# Patient Record
Sex: FEMALE | Race: WHITE | NOT HISPANIC OR LATINO | Employment: OTHER | URBAN - METROPOLITAN AREA
[De-identification: names, ages, dates, MRNs, and addresses within clinical notes are randomized per-mention and may not be internally consistent; named-entity substitution may affect disease eponyms.]

---

## 2017-01-08 ENCOUNTER — OFFICE VISIT (OUTPATIENT)
Dept: URGENT CARE | Facility: RETAIL CLINIC | Age: 47
End: 2017-01-08

## 2017-01-08 VITALS — TEMPERATURE: 98.5 F | DIASTOLIC BLOOD PRESSURE: 68 MMHG | SYSTOLIC BLOOD PRESSURE: 114 MMHG | HEART RATE: 67 BPM

## 2017-01-08 DIAGNOSIS — H60.503 ACUTE OTITIS EXTERNA OF BOTH EARS, UNSPECIFIED TYPE: Primary | ICD-10-CM

## 2017-01-08 PROCEDURE — 99213 OFFICE O/P EST LOW 20 MIN: CPT | Performed by: PHYSICIAN ASSISTANT

## 2017-01-08 RX ORDER — OFLOXACIN 3 MG/ML
SOLUTION/ DROPS OPHTHALMIC
Qty: 1 BOTTLE | Refills: 0 | Status: SHIPPED | OUTPATIENT
Start: 2017-01-08 | End: 2017-01-15

## 2017-01-08 NOTE — PATIENT INSTRUCTIONS
Use antibiotic drops in your ears 2x a day for 7 days  Symptomatic measures reviewed including over the counter pain reliever such as  ibuprofen as needed.   Keep water out of ear until the infection is cleared.   During long showers this week, use cotton ball in ear to keep water out of ear.  May swim with wax ear plug in affected ear this week if not painful.   No Qtips.   Warm compress next to ear   Please follow up with primary care provider if not improving, worsening or new symptoms or for any adverse reactions to medications.     Consider putting a few drops of vinegar in your ears few times a week during a shower to help rebalance (acidic) balance of ear

## 2017-01-08 NOTE — MR AVS SNAPSHOT
After Visit Summary   1/8/2017    Earlene Mark    MRN: [id number]           Patient Information     Date Of Birth          1970        Visit Information        Provider Department      1/8/2017 1:50 PM Gricelda Esteban PA-C [facility]        Today's Diagnoses     Acute otitis externa of both ears, unspecified type    -  1       Care Instructions    Use antibiotic drops in your ears 2x a day for 7 days  Symptomatic measures reviewed including over the counter pain reliever such as  ibuprofen as needed.   Keep water out of ear until the infection is cleared.   During long showers this week, use cotton ball in ear to keep water out of ear.  May swim with wax ear plug in affected ear this week if not painful.   No Qtips.   Warm compress next to ear   Please follow up with primary care provider if not improving, worsening or new symptoms or for any adverse reactions to medications.     Consider putting a few drops of vinegar in your ears few times a week during a shower to help rebalance (acidic) balance of ear        Follow-ups after your visit        Who to contact     You can reach your care team any time of the day by calling [phone].  Notification of test results:  If you have an abnormal lab result, we will notify you by phone as soon as possible.         Additional Information About Your Visit        MyChart Information     [employer] gives you secure access to your electronic health record. If you see a primary care provider, you can also send messages to your care team and make appointments. If you have questions, please call your primary care clinic.  If you do not have a primary care provider, please call [phone] and they will assist you.        Care EveryWhere ID     This is your Care EveryWhere ID. This could be used by other organizations to access your [place] medical records  [id number]        Your Vitals Were     Pulse Temperature Last Period              67 98.5  F (36.9  C) (Oral) 02/01/2010          Blood Pressure from Last 3 Encounters:   01/08/17 114/68   08/26/16 101/58   02/13/16 100/60    Weight from Last 3 Encounters:   02/13/16 130 lb (58.968 kg)   10/23/15 130 lb (58.968 kg)   02/19/14 129 lb 1.9 oz (58.568 kg)              Today, you had the following     No orders found for display         Today's Medication Changes          These changes are accurate as of: 1/8/17  2:05 PM.  If you have any questions, ask your nurse or doctor.               Start taking these medicines.        Dose/Directions    ofloxacin 0.3 % ophthalmic solution   Commonly known as:  OCUFLOX   Used for:  Acute otitis externa of both ears, unspecified type        Place 5 drops in both ears twice a day for 7 days   Quantity:  1 Bottle   Refills:  0         These medicines have changed or have updated prescriptions.        Dose/Directions    traZODone 50 MG tablet   Commonly known as:  DESYREL   This may have changed:  how much to take   Used for:  Other insomnia        Dose:  75 mg   Take 1.5 tablets (75 mg) by mouth At Bedtime   Quantity:  135 tablet   Refills:  3            Where to get your medicines      These medications were sent to [id number] - [place], [place] - [address] [address]  [address] [address]     Phone:  [phone]    - ofloxacin 0.3 % ophthalmic solution             Primary Care Provider Office Phone # Fax #    Kushal Linda [phone]       [facility] [address] [place] [address]        Thank you!     Thank you for choosing [facility] [place]  for your care. Our goal is always to provide you with excellent care. Hearing back from our patients is one way we can continue to improve our services. Please take a few minutes to complete the written survey that you may receive in the mail after your visit with us. Thank you!             Your Updated Medication List - Protect  others around you: Learn how to safely use, store and throw away your medicines at www.disposemymeds.org.          This list is accurate as of: 1/8/17  2:05 PM.  Always use your most recent med list.                   Brand Name Dispense Instructions for use    fluticasone 50 MCG/ACT spray    FLONASE    16 g    Spray 1-2 sprays into both nostrils daily       IBUPROFEN PO      Take 600 mg by mouth       Multiple vitamin Tabs      1 TABLET ORALLY DAILY       ofloxacin 0.3 % ophthalmic solution    OCUFLOX    1 Bottle    Place 5 drops in both ears twice a day for 7 days       rizatriptan 10 MG tablet    MAXALT    54 tablet    TAKE 1/2 TO 1 TABLET BY MOUTH AT ONSET OF MIGRAINE HEADACHE, MAY REPEAT IN 2 HOURS (DO NOT EXCEED 3 TABLETS (30MG) IN 24 HOURS)       traZODone 50 MG tablet    DESYREL    135 tablet    Take 1.5 tablets (75 mg) by mouth At Bedtime

## 2017-01-08 NOTE — PROGRESS NOTES
Chief Complaint   Patient presents with     Otalgia     began in Right, now both; 3 days; using old Rx drops - ofloxacin      SUBJECTIVE:  Earlene Mark is a 46 year old female who presents with Right ear pain that started 3 days ago, now also having symptoms in left ear today  Severity: moderate   Timing:gradual onset and still present  Additional symptoms include ear sxs, headache  No congestion  History of recurrent otitis: yes  Using old prescription ear drops 3 doses in right ear- ofloxacin opthalmic drops from 04/2016 - Rx states 5 drops BID x 7 days  No drainage    Past Medical History   Diagnosis Date     Headache(784.0) 10/21/92     Abnormal Papanicolaou smear of cervix and cervical HPV 1990     none since LEEP     Allergic rhinitis, cause unspecified      ? 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 12/18/2009     Menorrhagia 12/18/2009     MENSTRUAL MIGRAINES 5/14/2007     ANXIETY STATE NOS 6/6/2003     DEPRESSIVE DISORDER NEC 6/6/2003     s/p Hysterectomy, cervical pathology benign, no need for further pap smears 1/3/2013     Current Outpatient Prescriptions   Medication Sig Dispense Refill     ofloxacin (OCUFLOX) 0.3 % ophthalmic solution Place 5 drops in both ears twice a day for 7 days 1 Bottle 0     rizatriptan (MAXALT) 10 MG tablet TAKE 1/2 TO 1 TABLET BY MOUTH AT ONSET OF MIGRAINE HEADACHE, MAY REPEAT IN 2 HOURS (DO NOT EXCEED 3 TABLETS (30MG) IN 24 HOURS) 54 tablet 5     traZODone (DESYREL) 50 MG tablet Take 1.5 tablets (75 mg) by mouth At Bedtime (Patient taking differently: Take 25 mg by mouth At Bedtime ) 135 tablet 3     fluticasone (FLONASE) 50 MCG/ACT nasal spray Spray 1-2 sprays into both nostrils daily 16 g 1     IBUPROFEN PO Take 600 mg by mouth       MULTIPLE VITAMIN OR TABS 1 TABLET ORALLY DAILY  0        Allergies   Allergen Reactions     Morphine      Prochlorperazine      compazine -dystonic reaction     Seasonal Allergies         History   Smoking  status     Never Smoker    Smokeless tobacco     Never Used       ROS:   Review of systems negative except as stated above.    OBJECTIVE:  /68 mmHg  Pulse 67  Temp(Src) 98.5  F (36.9  C) (Oral)  LMP 02/01/2010  The right TM is normal: no effusions, no erythema, and normal landmarks     The right auditory canal is erythematous, swollen and tender  The left TM is mildly erythematous, swollen  The left auditory canal is normal and without drainage, edema or erythema  Oropharynx exam is normal: no lesions, erythema, adenopathy or exudate.  GENERAL: no acute distress  NECK: supple, non-tender to palpation, no adenopathy noted  SKIN: no suspicious lesions or rashes     ASSESSMENT:  Acute otitis externa of both ears, unspecified type [H60.503]    PLAN:  OFLOXACIN 0.3 % OP SOLN  Use antibiotic drops in your ears 2x a day for 7 days  Symptomatic measures reviewed including over the counter pain reliever such as  ibuprofen as needed.   Keep water out of ear until the infection is cleared.   During long showers this week, use cotton ball in ear to keep water out of ear.  May swim with wax ear plug in affected ear this week if not painful.   No Qtips.   Warm compress next to ear   Please follow up with primary care provider if not improving, worsening or new symptoms or for any adverse reactions to medications.   Consider putting a few drops of vinegar in your ears few times a week during a shower to help rebalance (acidic) balance of ear    Gricelda Esteban PA-C  [facility] [place]

## 2017-05-08 DIAGNOSIS — G47.09 OTHER INSOMNIA: ICD-10-CM

## 2017-05-08 NOTE — TELEPHONE ENCOUNTER
traZODone (DESYREL) 50 MG tablet  Last Written Prescription Date:  1/28/16  Last Fill Quantity: 135,   # refills: 3  Last Office Visit with [facility], [facility] or [facility] prescribing provider: 2/19/14  Future Office visit:       Routing refill request to provider for review/approval because:  Drug not on the [facility], [facility] or [facility] refill protocol or controlled substance

## 2017-05-09 RX ORDER — TRAZODONE HYDROCHLORIDE 50 MG/1
TABLET, FILM COATED ORAL
Qty: 135 TABLET | Refills: 3 | Status: SHIPPED | OUTPATIENT
Start: 2017-05-09 | End: 2018-01-31

## 2017-06-14 DIAGNOSIS — G43.009 MIGRAINE WITHOUT AURA AND WITHOUT STATUS MIGRAINOSUS, NOT INTRACTABLE: Primary | ICD-10-CM

## 2017-06-15 PROBLEM — G43.009 MIGRAINE WITHOUT AURA AND WITHOUT STATUS MIGRAINOSUS, NOT INTRACTABLE: Status: ACTIVE | Noted: 2017-06-15

## 2017-06-15 RX ORDER — RIZATRIPTAN BENZOATE 10 MG/1
TABLET ORAL
Qty: 54 TABLET | Refills: 4 | Status: SHIPPED | OUTPATIENT
Start: 2017-06-15 | End: 2018-06-05

## 2017-06-15 NOTE — TELEPHONE ENCOUNTER
Routing refill request to provider for review/approval because:  Patient needs to be seen because it has been more than 1 year since last office visit.    Radha Toney RN

## 2017-06-15 NOTE — TELEPHONE ENCOUNTER
rizatriptan (MAXALT) 10 MG tablet      Last Written Prescription Date: 4/6/16  Last Fill Quantity: 54, # refills: 5  Last Office Visit with [facility], [facility] or [facility] prescribing provider: 10/23/15       BP Readings from Last 3 Encounters:   01/08/17 114/68   08/26/16 101/58   02/13/16 100/60

## 2017-08-01 ENCOUNTER — OFFICE VISIT (OUTPATIENT)
Dept: URGENT CARE | Facility: RETAIL CLINIC | Age: 47
End: 2017-08-01

## 2017-08-01 VITALS
OXYGEN SATURATION: 99 % | HEART RATE: 65 BPM | SYSTOLIC BLOOD PRESSURE: 123 MMHG | TEMPERATURE: 97.8 F | DIASTOLIC BLOOD PRESSURE: 73 MMHG

## 2017-08-01 DIAGNOSIS — J01.90 ACUTE SINUSITIS WITH SYMPTOMS > 10 DAYS: Primary | ICD-10-CM

## 2017-08-01 DIAGNOSIS — R05.9 COUGH: ICD-10-CM

## 2017-08-01 DIAGNOSIS — J02.9 ACUTE PHARYNGITIS, UNSPECIFIED ETIOLOGY: ICD-10-CM

## 2017-08-01 LAB — S PYO AG THROAT QL IA.RAPID: NORMAL

## 2017-08-01 PROCEDURE — 87880 STREP A ASSAY W/OPTIC: CPT | Mod: QW | Performed by: PHYSICIAN ASSISTANT

## 2017-08-01 PROCEDURE — 99214 OFFICE O/P EST MOD 30 MIN: CPT | Performed by: PHYSICIAN ASSISTANT

## 2017-08-01 PROCEDURE — 87081 CULTURE SCREEN ONLY: CPT | Performed by: PHYSICIAN ASSISTANT

## 2017-08-01 RX ORDER — DOXYCYCLINE HYCLATE 100 MG
100 TABLET ORAL 2 TIMES DAILY
Qty: 14 TABLET | Refills: 0 | Status: SHIPPED | OUTPATIENT
Start: 2017-08-01 | End: 2017-08-08

## 2017-08-01 RX ORDER — BENZONATATE 100 MG/1
CAPSULE ORAL
Qty: 40 CAPSULE | Refills: 0 | Status: SHIPPED | OUTPATIENT
Start: 2017-08-01 | End: 2018-04-20

## 2017-08-01 RX ORDER — CODEINE PHOSPHATE AND GUAIFENESIN 10; 100 MG/5ML; MG/5ML
1-2 SOLUTION ORAL EVERY 4 HOURS PRN
Qty: 120 ML | Refills: 0 | Status: SHIPPED | OUTPATIENT
Start: 2017-08-01 | End: 2018-04-20

## 2017-08-01 NOTE — PROGRESS NOTES
Chief Complaint   Patient presents with     Pharyngitis     x 13 days, no fevers     Sinus Problem     x 13 days, snus congestion, coughing x 3 days     Ear Problem     bilateral ear pain x 3 days     SUBJECTIVE:  Earlene Mark is a 47 year old female presenting with a chief complaint of sinus pain and pressure and a sore throat.  Onset of symptoms was 13 days ago.  Course of illness: gradual onset.  Severity: moderate  Current and Associated symptoms: nasal congestion, cough, ear pain bilaterally, sore throat, facial pain/pressure and headache. Facial pressure worse when bending forward. Cough has been for the last 3 days and is worse at night.  Treatment measures tried include: OTC meds- mucinex, netti pot, salt water rinses and apple cider vinegar.   Predisposing factors include: None.    Past Medical History:   Diagnosis Date     Abnormal Papanicolaou smear of cervix and cervical HPV 1990    none since LEEP     Allergic rhinitis, cause unspecified     ? 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 12/18/2009     DEPRESSIVE DISORDER NEC 6/6/2003     Headache(784.0) 10/21/92     Menorrhagia 12/18/2009     MENSTRUAL MIGRAINES 5/14/2007     s/p Hysterectomy, cervical pathology benign, no need for further pap smears 1/3/2013     Current Outpatient Prescriptions   Medication Sig Dispense Refill     GuaiFENesin (MUCINEX PO)        Sodium Chloride-Sodium Bicarb (AYR SALINE NASAL RINSE NA)        Pseudoephedrine-Ibuprofen (ADVIL COLD/SINUS PO)        doxycycline (VIBRA-TABS) 100 MG tablet Take 1 tablet (100 mg) by mouth 2 times daily for 7 days 14 tablet 0     guaiFENesin-codeine (ROBITUSSIN AC) 100-10 MG/5ML SOLN solution Take 5-10 mLs by mouth every 4 hours as needed for cough 120 mL 0     benzonatate (TESSALON) 100 MG capsule Take 1-2 capsules by mouth 3 times daily as needed for cough. 40 capsule 0     rizatriptan (MAXALT) 10 MG tablet TAKE 1/2 TO 1 TABLET BY  MOUTH AT ONSET OF MIGRAINE HEADACHE, MAY REPEAT IN 2 HOURS (DO NOT EXCEED 3 TABLETS (30MG) IN 24 HOURS) 54 tablet 4     traZODone (DESYREL) 50 MG tablet TAKE ONE AND ONE-HALF TABLET BY MOUTH AT BEDTIME 135 tablet 3     IBUPROFEN PO Take 600 mg by mouth       MULTIPLE VITAMIN OR TABS 1 TABLET ORALLY DAILY  0     fluticasone (FLONASE) 50 MCG/ACT nasal spray Spray 1-2 sprays into both nostrils daily (Patient not taking: Reported on 8/1/2017) 16 g 1     Social History   Substance Use Topics     Smoking status: Never Smoker     Smokeless tobacco: Never Used     Alcohol use No     Allergies   Allergen Reactions     Morphine      Prochlorperazine      compazine -dystonic reaction     Seasonal Allergies      ROS:  Review of systems negative except as stated above.    OBJECTIVE:   /73 (BP Location: Left arm)  Pulse 65  Temp 97.8  F (36.6  C) (Temporal)  LMP 02/01/2010  SpO2 99%  GENERAL APPEARANCE: healthy, alert and in no distress  HEENT: Pain with palpation over frontal sinuses bilaterally. Eyes PEERL, conjunctiva clear. Bilateral ear canals and TMs normal. Nasal turbinates edematous. Pharynx erythematous without tonsillar hypertrophy or exudate noted.  NECK: supple, non-tender to palpation, no adenopathy noted  RESP: lungs clear to auscultation - no rales, rhonchi or wheezes  CV: regular rates and rhythm, normal S1 S2, no murmur noted  SKIN: no suspicious lesions or rashes    Rapid Strep test is negative; await throat culture results.    ASSESSMENT:    ICD-10-CM    1. Acute sinusitis with symptoms > 10 days J01.90 doxycycline (VIBRA-TABS) 100 MG tablet   2. Acute pharyngitis, unspecified etiology J02.9 RAPID STREP SCREEN     BETA STREP GROUP A R/O CULTURE   3. Cough R05 guaiFENesin-codeine (ROBITUSSIN AC) 100-10 MG/5ML SOLN solution     benzonatate (TESSALON) 100 MG capsule     PLAN:   Discussed antibiotic options. Augmentin has not worked well in the past and she'd prefer an alternative if possible.  Patient  Instructions   Doxycycline twice daily for 7 days.  Tessalon Perles- Take 1-2 capsules by mouth 3 times daily as needed for cough.  Robitussin with codeine as needed for cough before sleep, up to every 4-6 hours. DO NOT take before driving a vehicle.  Sudafed behind the pharmacist counter for 3-5 days helps relieve congestion  Flonase 2 sprays in each nostril daily until symptoms resolve, then continue 1 spray in each nostril for at least 5 more days.  Take Tylenol or an NSAID such as ibuprofen or naproxen as needed for pain.  May use netti pot with bottled or distilled water and saline packets to flush sinuses.  Afrin (oxymetazoline) nasal spray twice daily for 3 days. Stop after 3 days.  Mucinex (guiafenesin) thins mucus and may help it to loosen more quickly  Saline drops or nasal sprays may loosen mucus.  Sit in the bathroom with the door closed and hot shower running to loosen mucus.    Contact primary care clinic if you do not have any relief from your symptoms after 10 days.  Present to emergency room for significantly increasing pain, persistent high fever >102F, swelling/redness around your eyes, changes in your vision or ability to move your eyes, altered mental status or a severe headache.    Follow up with primary care provider with any problems, questions or concerns or if symptoms worsen or fail to improve. Patient agreed to plan and verbalized understanding.    Brianna Archuleta PA-C  [facility] [place]

## 2017-08-01 NOTE — PATIENT INSTRUCTIONS
Doxycycline twice daily for 7 days.  Teservnion Perles- Take 1-2 capsules by mouth 3 times daily as needed for cough.  Robitussin with codeine as needed for cough before sleep, up to every 4-6 hours. DO NOT take before driving a vehicle.  Sudafed behind the pharmacist counter for 3-5 days helps relieve congestion  Flonase 2 sprays in each nostril daily until symptoms resolve, then continue 1 spray in each nostril for at least 5 more days.  Take Tylenol or an NSAID such as ibuprofen or naproxen as needed for pain.  May use netti pot with bottled or distilled water and saline packets to flush sinuses.  Afrin (oxymetazoline) nasal spray twice daily for 3 days. Stop after 3 days.  Mucinex (guiafenesin) thins mucus and may help it to loosen more quickly  Saline drops or nasal sprays may loosen mucus.  Sit in the bathroom with the door closed and hot shower running to loosen mucus.    Contact primary care clinic if you do not have any relief from your symptoms after 10 days.  Present to emergency room for significantly increasing pain, persistent high fever >102F, swelling/redness around your eyes, changes in your vision or ability to move your eyes, altered mental status or a severe headache.

## 2017-08-01 NOTE — MR AVS SNAPSHOT
After Visit Summary   8/1/2017    Earlene Mark    MRN: [id number]           Patient Information     Date Of Birth          1970        Visit Information        Provider Department      8/1/2017 9:20 AM Medina Archuleta PA-C [facility]        Today's Diagnoses     Acute sinusitis with symptoms > 10 days    -  1    Acute pharyngitis, unspecified etiology        Cough          Care Instructions    Doxycycline twice daily for 7 days.  Tessalon Perles- Take 1-2 capsules by mouth 3 times daily as needed for cough.  Robitussin with codeine as needed for cough before sleep, up to every 4-6 hours. DO NOT take before driving a vehicle.  Sudafed behind the pharmacist counter for 3-5 days helps relieve congestion  Flonase 2 sprays in each nostril daily until symptoms resolve, then continue 1 spray in each nostril for at least 5 more days.  Take Tylenol or an NSAID such as ibuprofen or naproxen as needed for pain.  May use netti pot with bottled or distilled water and saline packets to flush sinuses.  Afrin (oxymetazoline) nasal spray twice daily for 3 days. Stop after 3 days.  Mucinex (guiafenesin) thins mucus and may help it to loosen more quickly  Saline drops or nasal sprays may loosen mucus.  Sit in the bathroom with the door closed and hot shower running to loosen mucus.    Contact primary care clinic if you do not have any relief from your symptoms after 10 days.  Present to emergency room for significantly increasing pain, persistent high fever >102F, swelling/redness around your eyes, changes in your vision or ability to move your eyes, altered mental status or a severe headache.          Follow-ups after your visit        Who to contact     You can reach your care team any time of the day by calling [phone].  Notification of test results:  If you have an abnormal lab result, we will notify you by phone as soon as possible.         Additional Information About Your  Visit        [employer] Information     [employer] gives you secure access to your electronic health record. If you see a primary care provider, you can also send messages to your care team and make appointments. If you have questions, please call your primary care clinic.  If you do not have a primary care provider, please call [phone] and they will assist you.        Care EveryWhere ID     This is your Care EveryWhere ID. This could be used by other organizations to access your [place] medical records  [id number]        Your Vitals Were     Pulse Temperature Last Period Pulse Oximetry          65 97.8  F (36.6  C) (Temporal) 02/01/2010 99%         Blood Pressure from Last 3 Encounters:   08/01/17 123/73   01/08/17 114/68   08/26/16 101/58    Weight from Last 3 Encounters:   02/13/16 130 lb (59 kg)   10/23/15 130 lb (59 kg)   02/19/14 129 lb 1.9 oz (58.6 kg)              We Performed the Following     BETA STREP GROUP A R/O CULTURE     RAPID STREP SCREEN          Today's Medication Changes          These changes are accurate as of: 8/1/17  9:35 AM.  If you have any questions, ask your nurse or doctor.               Start taking these medicines.        Dose/Directions    benzonatate 100 MG capsule   Commonly known as:  TESSALON   Used for:  Cough        Take 1-2 capsules by mouth 3 times daily as needed for cough.   Quantity:  40 capsule   Refills:  0       doxycycline 100 MG tablet   Commonly known as:  VIBRA-TABS   Used for:  Acute sinusitis with symptoms > 10 days        Dose:  100 mg   Take 1 tablet (100 mg) by mouth 2 times daily for 7 days   Quantity:  14 tablet   Refills:  0       guaiFENesin-codeine 100-10 MG/5ML Soln solution   Commonly known as:  ROBITUSSIN AC   Used for:  Cough        Dose:  1-2 tsp.   Take 5-10 mLs by mouth every 4 hours as needed for cough   Quantity:  120 mL   Refills:  0            Where to get your medicines      These medications were sent to [id number] - [place] - [address]  [facility]  [address] [address] [address]     Phone:  [phone]     benzonatate 100 MG capsule    doxycycline 100 MG tablet         Some of these will need a paper prescription and others can be bought over the counter.  Ask your nurse if you have questions.     Bring a paper prescription for each of these medications     guaiFENesin-codeine 100-10 MG/5ML Soln solution                Primary Care Provider Office Phone # Fax #    Kushal Linda [phone]       [facility] [address] [place] [address]        Equal Access to Services     [facility]: Hadii aad ku hadasho Soomaali, waaxda luqadaha, qaybta kaalmada adeegyada, alyssa holden . So [facility] [phone].    ATENCIÓN: Si habla español, tiene a almeida disposición servicios gratuitos de asistencia lingüística. [facility] [phone].    We comply with applicable federal civil rights laws and [place] laws. We do not discriminate on the basis of race, color, national origin, age, disability sex, sexual orientation or gender identity.            Thank you!     Thank you for choosing [facility]  for your care. Our goal is always to provide you with excellent care. Hearing back from our patients is one way we can continue to improve our services. Please take a few minutes to complete the written survey that you may receive in the mail after your visit with us. Thank you!             Your Updated Medication List - Protect others around you: Learn how to safely use, store and throw away your medicines at www.disposemymeds.org.          This list is accurate as of: 8/1/17  9:35 AM.  Always use your most recent med list.                   Brand Name Dispense Instructions for use Diagnosis    ADVIL COLD/SINUS PO           AYR SALINE NASAL RINSE NA           benzonatate 100 MG capsule    TESSALON    40 capsule    Take 1-2 capsules by mouth 3 times daily as  needed for cough.    Cough       doxycycline 100 MG tablet    VIBRA-TABS    14 tablet    Take 1 tablet (100 mg) by mouth 2 times daily for 7 days    Acute sinusitis with symptoms > 10 days       fluticasone 50 MCG/ACT spray    FLONASE    16 g    Spray 1-2 sprays into both nostrils daily    Sinus congestion       guaiFENesin-codeine 100-10 MG/5ML Soln solution    ROBITUSSIN AC    120 mL    Take 5-10 mLs by mouth every 4 hours as needed for cough    Cough       IBUPROFEN PO      Take 600 mg by mouth        MUCINEX PO           Multiple vitamin Tabs      1 TABLET ORALLY DAILY        rizatriptan 10 MG tablet    MAXALT    54 tablet    TAKE 1/2 TO 1 TABLET BY MOUTH AT ONSET OF MIGRAINE HEADACHE, MAY REPEAT IN 2 HOURS (DO NOT EXCEED 3 TABLETS (30MG) IN 24 HOURS)    Migraine without aura and without status migrainosus, not intractable       traZODone 50 MG tablet    DESYREL    135 tablet    TAKE ONE AND ONE-HALF TABLET BY MOUTH AT BEDTIME    Other insomnia

## 2017-08-01 NOTE — NURSING NOTE
"Chief Complaint   Patient presents with     Pharyngitis     x 13 days, no fevers     Sinus Problem     x 13 days, snus congestion, coughing x 3 days     Ear Problem     bilateral ear pain x 3 days       Initial /73 (BP Location: Left arm)  Pulse 65  Temp 97.8  F (36.6  C) (Temporal)  LMP 02/01/2010  SpO2 99% Estimated body mass index is 21.47 kg/(m^2) as calculated from the following:    Height as of 10/23/15: 5' 5.25\" (1.657 m).    Weight as of 2/13/16: 130 lb (59 kg).  Medication Reconciliation: complete    "

## 2017-08-03 LAB — BETA STREP CONFIRM: NORMAL

## 2018-01-31 ENCOUNTER — TELEPHONE (OUTPATIENT)
Dept: FAMILY MEDICINE | Facility: CLINIC | Age: 48
End: 2018-01-31

## 2018-01-31 DIAGNOSIS — G47.09 OTHER INSOMNIA: ICD-10-CM

## 2018-01-31 RX ORDER — TRAZODONE HYDROCHLORIDE 50 MG/1
TABLET, FILM COATED ORAL
Qty: 135 TABLET | Refills: 3 | Status: SHIPPED | OUTPATIENT
Start: 2018-01-31 | End: 2018-09-23

## 2018-01-31 NOTE — TELEPHONE ENCOUNTER
Rx was sent to that pharmacy for her.     Electronically signed by:  Kushal Linda M.D.  1/31/2018

## 2018-01-31 NOTE — TELEPHONE ENCOUNTER
Reason for Call:  Other prescription- traZODone (DESYREL) 50 MG tablet    Detailed comments: Patient called and states that she usually requests this medication through the [facility] but it is more expensive to fill this prescription there so she is wondering if Dr. Linda would be able to fax the script to [facility]. She said she has used this pharmacy before. Their fax number is [phone] and their phone number is [phone]. Please advise.     Phone Number Patient can be reached at: Home number on file [phone] (home)    Best Time: any     Can we leave a detailed message on this number? YES    Call taken on 1/31/2018 at 2:27 PM by Immanuel Cannon

## 2018-04-20 ENCOUNTER — OFFICE VISIT (OUTPATIENT)
Dept: FAMILY MEDICINE | Facility: CLINIC | Age: 48
End: 2018-04-20

## 2018-04-20 VITALS
DIASTOLIC BLOOD PRESSURE: 54 MMHG | HEIGHT: 65 IN | RESPIRATION RATE: 16 BRPM | TEMPERATURE: 97.8 F | SYSTOLIC BLOOD PRESSURE: 102 MMHG | HEART RATE: 80 BPM | OXYGEN SATURATION: 100 % | WEIGHT: 139 LBS | BODY MASS INDEX: 23.16 KG/M2

## 2018-04-20 DIAGNOSIS — Z23 ENCOUNTER FOR IMMUNIZATION: ICD-10-CM

## 2018-04-20 DIAGNOSIS — Z00.00 ENCOUNTER FOR ROUTINE ADULT HEALTH EXAMINATION WITHOUT ABNORMAL FINDINGS: Primary | ICD-10-CM

## 2018-04-20 PROCEDURE — 99396 PREV VISIT EST AGE 40-64: CPT | Performed by: FAMILY MEDICINE

## 2018-04-20 PROCEDURE — 90715 TDAP VACCINE 7 YRS/> IM: CPT | Performed by: FAMILY MEDICINE

## 2018-04-20 ASSESSMENT — PAIN SCALES - GENERAL: PAINLEVEL: NO PAIN (0)

## 2018-04-20 NOTE — MR AVS SNAPSHOT
After Visit Summary   4/20/2018    Earlene Mark    MRN: [id number]           Patient Information     Date Of Birth          1970        Visit Information        Provider Department      4/20/2018 11:50 AM Kushal Linda MD [facility] Instructions      Preventive Health Recommendations  Female Ages 40 to 49    Yearly exam:     See your health care provider every year in order to  1. Review health changes.   2. Discuss preventive care.    3. Review your medicines if your doctor prescribed any.      Get a Pap test every three years (unless you have an abnormal result and your provider advises testing more often).      If you get Pap tests with HPV test, you only need to test every 5 years, unless you have an abnormal result. You do not need a Pap test if your uterus was removed (hysterectomy) and you have not had cancer.      You should be tested each year for STDs (sexually transmitted diseases), if you're at risk.       Ask your doctor if you should have a mammogram.      Have a colonoscopy (test for colon cancer) if someone in your family has had colon cancer or polyps before age 50.       Have a cholesterol test every 5 years.       Have a diabetes test (fasting glucose) after age 45. If you are at risk for diabetes, you should have this test every 3 years.    Shots: Get a flu shot each year. Get a tetanus shot every 10 years.     Nutrition:     Eat at least 5 servings of fruits and vegetables each day.    Eat whole-grain bread, whole-wheat pasta and brown rice instead of white grains and rice.    Talk to your provider about Calcium and Vitamin D.     Lifestyle    Exercise at least 150 minutes a week (an average of 30 minutes a day, 5 days a week). This will help you control your weight and prevent disease.    Limit alcohol to one drink per day.    No smoking.     Wear sunscreen to prevent skin cancer.    See your dentist every six months for an exam and  "cleaning.          Follow-ups after your visit        Who to contact     If you have questions or need follow up information about today's clinic visit or your schedule please contact [facility] directly at [phone].  Normal or non-critical lab and imaging results will be communicated to you by MyChart, letter or phone within 4 business days after the clinic has received the results. If you do not hear from us within 7 days, please contact the clinic through MyChart or phone. If you have a critical or abnormal lab result, we will notify you by phone as soon as possible.  Submit refill requests through [employer] or call your pharmacy and they will forward the refill request to us. Please allow 3 business days for your refill to be completed.          Additional Information About Your Visit        [employer] Information     [employer] gives you secure access to your electronic health record. If you see a primary care provider, you can also send messages to your care team and make appointments. If you have questions, please call your primary care clinic.  If you do not have a primary care provider, please call [phone] and they will assist you.        Care EveryWhere ID     This is your Care EveryWhere ID. This could be used by other organizations to access your [place] medical records  [id number]        Your Vitals Were     Pulse Temperature Respirations Height Last Period Pulse Oximetry    80 97.8  F (36.6  C) (Temporal) 16 5' 5.4\" (1.661 m) 02/01/2010 100%    BMI (Body Mass Index)                   22.85 kg/m2            Blood Pressure from Last 3 Encounters:   04/20/18 102/54   08/01/17 123/73   01/08/17 114/68    Weight from Last 3 Encounters:   04/20/18 139 lb (63 kg)   02/13/16 130 lb (59 kg)   10/23/15 130 lb (59 kg)              Today, you had the following     No orders found for display       Primary Care Provider Office Phone # Fax #    Kushal Linda [phone]    "    [address] [place] [address]        Equal Access to Services     CRESCENCIO DAVIS : Hadii aad ku hadursulasonido Soeileenali, waflorencioda luqadaha, qamagalyta kaalmaroman montgomery, alyssa gastonhilariacarrie morris. So [facility] [phone].    ATENCIÓN: Si habla español, tiene a almeida disposición servicios gratuitos de asistencia lingüística. Llame al [phone].    We comply with applicable federal civil rights laws and [place] laws. We do not discriminate on the basis of race, color, national origin, age, disability, sex, sexual orientation, or gender identity.            Thank you!     Thank you for choosing [facility]  for your care. Our goal is always to provide you with excellent care. Hearing back from our patients is one way we can continue to improve our services. Please take a few minutes to complete the written survey that you may receive in the mail after your visit with us. Thank you!             Your Updated Medication List - Protect others around you: Learn how to safely use, store and throw away your medicines at www.disposemymeds.org.          This list is accurate as of 4/20/18 12:09 PM.  Always use your most recent med list.                   Brand Name Dispense Instructions for use Diagnosis    AYR SALINE NASAL RINSE NA           Multiple vitamin Tabs      1 TABLET ORALLY DAILY        rizatriptan 10 MG tablet    MAXALT    54 tablet    TAKE 1/2 TO 1 TABLET BY MOUTH AT ONSET OF MIGRAINE HEADACHE, MAY REPEAT IN 2 HOURS (DO NOT EXCEED 3 TABLETS (30MG) IN 24 HOURS)    Migraine without aura and without status migrainosus, not intractable       traZODone 50 MG tablet    DESYREL    135 tablet    TAKE ONE AND ONE-HALF TABLET BY MOUTH AT BEDTIME    Other insomnia

## 2018-04-20 NOTE — NURSING NOTE
"Chief Complaint   Patient presents with     Physical       Initial Temp 97.8  F (36.6  C) (Temporal)  Ht 5' 5.4\" (1.661 m)  Wt 139 lb (63 kg)  LMP 02/01/2010  SpO2 100%  BMI 22.85 kg/m2 Estimated body mass index is 22.85 kg/(m^2) as calculated from the following:    Height as of this encounter: 5' 5.4\" (1.661 m).    Weight as of this encounter: 139 lb (63 kg).  Medication Reconciliation: complete    "

## 2018-04-20 NOTE — NURSING NOTE
Prior to injection verified patient identity using patient's name and date of birth.  Due to injection administration, patient instructed to remain in clinic for 15 minutes  afterwards, and to report any adverse reaction to me immediately.  Piper Davison CMA

## 2018-04-20 NOTE — PROGRESS NOTES
SUBJECTIVE:   CC: Earlene Mark is an 47 year old woman who presents for preventive health visit.     Physical   Annual:     Getting at least 3 servings of Calcium per day::  Yes    Bi-annual eye exam::  Yes    Dental care twice a year::  Yes    Sleep apnea or symptoms of sleep apnea::  None    Diet::  Regular (no restrictions)    Taking medications regularly::  Yes    Medication side effects::  None    Additional concerns today::  No                PROBLEMS TO ADD ON...  Patient has tr to gain some weight because she thought she was too thin and has gained ied 20 pounds over the last 6 months and is wondering if she is in a good weight range for her size.  She works out 5-6 week mix of aerobic and high intensity exercises along with some weight training.  She feels good no set her blood pressure has been a little low usually in the /40-50 range she does not complain of any lightheadedness or dizziness with changes.  She has not had a mammogram for a number of years.    Today's PHQ-2 Score:   PHQ-2 ( 1999 Pfizer) 4/20/2018   Q1: Little interest or pleasure in doing things 0   Q2: Feeling down, depressed or hopeless 0   PHQ-2 Score 0   Q1: Little interest or pleasure in doing things Not at all   Q2: Feeling down, depressed or hopeless Not at all   PHQ-2 Score 0       Abuse: Current or Past(Physical, Sexual or Emotional)- No  Do you feel safe in your environment - Yes    Social History   Substance Use Topics     Smoking status: Never Smoker     Smokeless tobacco: Never Used     Alcohol use No     Alcohol Use 4/20/2018   If you drink alcohol do you typically have greater than 3 drinks per day OR greater than 7 drinks per week? No     Reviewed orders with patient.  Reviewed health maintenance and updated orders accordingly - Yes  Labs reviewed in EPIC  BP Readings from Last 3 Encounters:   04/20/18 102/54   08/01/17 123/73   01/08/17 114/68    Wt Readings from Last 3 Encounters:   04/20/18 139 lb (63 kg)    16 130 lb (59 kg)   10/23/15 130 lb (59 kg)                  Patient Active Problem List   Diagnosis     FILEMON 3 - cervical intraepithelial neoplasia grade 3     Menorrhagia     CARDIOVASCULAR SCREENING; LDL GOAL LESS THAN 160     s/p Hysterectomy, cervical pathology benign, no need for further pap smears     Other insomnia     Migraine without aura and without status migrainosus, not intractable     Past Surgical History:   Procedure Laterality Date     C  DELIVERY ONLY      , Low Cervical     COLPOSCOPY,LOOP ELECTRD CERVIX EXCIS       HC CONIZATION CERVIX,KNIFE/LASER  08    FILEMON 3     HC REMOVAL OF TONSILS,12+ Y/O       HYSTERECTOMY, VAGINAL  02/02/10    laparoscopic assisted vaginal.  benign cervix     TEST NOT FOUND  2001    Abdominoplasty (tummy tuck)       Social History   Substance Use Topics     Smoking status: Never Smoker     Smokeless tobacco: Never Used     Alcohol use No     Family History   Problem Relation Age of Onset     Neurologic Disorder Mother      migraines     Lipids Mother      Arthritis Mother      rheumatoid     Chronic Obstructive Pulmonary Disease Mother      smoker     Hypertension Father      Lipids Father      Blood Disease Father 51     DVT  at age 51 from probable PE     Neurologic Disorder Father      Muscular Dystrophy     Muscular Dystrophy Brother 19     DIABETES Paternal Grandmother      DIABETES Paternal Grandfather      CANCER Paternal Grandfather      colon     Neurologic Disorder Brother      Muscular Dystrophy  at age 19 from an MI     CANCER Paternal Uncle      colon     Asthma Son      Congenital Anomalies Son      epilepsy hydrocephilis         Current Outpatient Prescriptions   Medication Sig Dispense Refill     MULTIPLE VITAMIN OR TABS 1 TABLET ORALLY DAILY  0     rizatriptan (MAXALT) 10 MG tablet TAKE 1/2 TO 1 TABLET BY MOUTH AT ONSET OF MIGRAINE HEADACHE, MAY REPEAT IN 2 HOURS (DO NOT EXCEED 3 TABLETS (30MG) IN 24  HOURS) 54 tablet 4     Sodium Chloride-Sodium Bicarb (AYR SALINE NASAL RINSE NA)        traZODone (DESYREL) 50 MG tablet TAKE ONE AND ONE-HALF TABLET BY MOUTH AT BEDTIME 135 tablet 3     Allergies   Allergen Reactions     Morphine      Prochlorperazine      compazine -dystonic reaction     Seasonal Allergies      Recent Labs   Lab Test  14   2254  14   0817   LDL   --   95   HDL   --   68   TRIG   --   58   ALT  43   --    CR  0.76   --    GFRESTIMATED  83   --    GFRESTBLACK  >90   --    POTASSIUM  3.7   --         Patient under age 50, mutual decision reflected in health maintenance.      Pertinent mammograms are reviewed under the imaging tab.  History of abnormal Pap smear: NO - age 30-65 PAP every 5 years with negative HPV co-testing recommended    Reviewed and updated as needed this visit by clinical staff  Tobacco  Allergies  Meds  Problems         Reviewed and updated as needed this visit by Provider        Past Medical History:   Diagnosis Date     Abnormal Papanicolaou smear of cervix and cervical HPV     none since LEEP     Allergic rhinitis, cause unspecified     ? 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     DEPRESSIVE DISORDER NEC 2003     Headache(784.0) 10/21/92     Menorrhagia 2009     MENSTRUAL MIGRAINES 2007     s/p Hysterectomy, cervical pathology benign, no need for further pap smears 1/3/2013      Past Surgical History:   Procedure Laterality Date     C  DELIVERY ONLY      , Low Cervical     COLPOSCOPY,LOOP ELECTRD CERVIX EXCIS       HC CONIZATION CERVIX,KNIFE/LASER  08    FILEMON 3     HC REMOVAL OF TONSILS,12+ Y/O       HYSTERECTOMY, VAGINAL  02/02/10    laparoscopic assisted vaginal.  benign cervix     TEST NOT FOUND  2001    Abdominoplasty (tummy tuck)     Obstetric History       T2      L3     SAB0   TAB0   Ectopic0   Multiple0   Live  "Births3       # Outcome Date GA Lbr Dontae/2nd Weight Sex Delivery Anes PTL Lv   3 Term 98 38w0d 06:00 7 lb 13 oz (3.544 kg) M    ANTHONY      Name: Roque Odonnell Term 95 37w0d 05:00 7 lb 8 oz (3.402 kg) M    ANTHONY      Name: Zhen López  94 35w0d  6 lb 8 oz (2.948 kg) M CS   ANTHONY      Name: Milton          Review of Systems  CONSTITUTIONAL: NEGATIVE for fever, chills, change in weight  INTEGUMENTARU/SKIN: NEGATIVE for worrisome rashes, moles or lesions  EYES: NEGATIVE for vision changes or irritation  ENT: NEGATIVE for ear, mouth and throat problems  RESP: NEGATIVE for significant cough or SOB  BREAST: NEGATIVE for masses, tenderness or discharge  CV: NEGATIVE for chest pain, palpitations or peripheral edema  GI: NEGATIVE for nausea, abdominal pain, heartburn, or change in bowel habits  : NEGATIVE for unusual urinary or vaginal symptoms. Periods are regular.  MUSCULOSKELETAL: NEGATIVE for significant arthralgias or myalgia  NEURO: NEGATIVE for weakness, dizziness or paresthesias  PSYCHIATRIC: NEGATIVE for changes in mood or affect     OBJECTIVE:   Temp 97.8  F (36.6  C) (Temporal)  Ht 5' 5.4\" (1.661 m)  Wt 139 lb (63 kg)  LMP 2010  SpO2 100%  BMI 22.85 kg/m2  Physical Exam  GENERAL: healthy, alert and no distress  EYES: Eyes grossly normal to inspection, PERRL and conjunctivae and sclerae normal  HENT: ear canals and TM's normal, nose and mouth without ulcers or lesions  NECK: no adenopathy, no asymmetry, masses, or scars and thyroid normal to palpation  RESP: lungs clear to auscultation - no rales, rhonchi or wheezes  BREAST: No breast exam done, we discussed self breast awareness and what to be concerned about, ie; retraction of a nipple, dimpling of the skin or any nipple discharge or aerolar rash that does not clear.   CV: regular rate and rhythm, normal S1 S2, no S3 or S4, no murmur, click or rub, no peripheral edema and peripheral pulses strong  ABDOMEN: soft, nontender, no " "hepatosplenomegaly, no masses and bowel sounds normal   (female): Exam deferred, patient not due for a pap smear and she is without complaints or concerns today.   MS: no gross musculoskeletal defects noted, no edema  SKIN: no suspicious lesions or rashes  NEURO: Normal strength and tone, mentation intact and speech normal  PSYCH: mentation appears normal, affect normal/bright    ASSESSMENT/PLAN:   (Z00.00) Encounter for routine adult health examination without abnormal findings  (primary encounter diagnosis)  Comment: Patient is doing exceedingly well.  She has gained some weight and Isordil to 22.8 range which is excellent for her.  Plan: I congratulated her on her excellent fitness and continued exercise program.  She looks fantastic and will continue with her current program.    (Z23) Encounter for immunization  Comment: She is not up-to-date on her tetanus booster and needs the pertussis component.  Plan: TDAP VACCINE (ADACEL)        Gave her a Tdap today.      COUNSELING:  Reviewed preventive health counseling, as reflected in patient instructions       Regular exercise       Healthy diet/nutrition       Vision screening       Immunizations    Vaccinated for: TDAP             Alcohol Use         reports that she has never smoked. She has never used smokeless tobacco.    Estimated body mass index is 22.85 kg/(m^2) as calculated from the following:    Height as of this encounter: 5' 5.4\" (1.661 m).    Weight as of this encounter: 139 lb (63 kg).       Counseling Resources:  ATP IV Guidelines  Pooled Cohorts Equation Calculator  Breast Cancer Risk Calculator  FRAX Risk Assessment  ICSI Preventive Guidelines  Dietary Guidelines for Americans, 2010  USDA's MyPlate  ASA Prophylaxis  Lung CA Screening    Electronically signed by:  Kushal Linda M.D.  4/20/2018    "

## 2018-04-27 ENCOUNTER — HOSPITAL ENCOUNTER (OUTPATIENT)
Dept: MAMMOGRAPHY | Facility: CLINIC | Age: 48
Discharge: HOME | End: 2018-04-27
Attending: FAMILY MEDICINE | Admitting: FAMILY MEDICINE

## 2018-04-27 DIAGNOSIS — Z12.31 VISIT FOR SCREENING MAMMOGRAM: ICD-10-CM

## 2018-04-27 PROCEDURE — 77063 BREAST TOMOSYNTHESIS BI: CPT

## 2018-06-05 ENCOUNTER — TELEPHONE (OUTPATIENT)
Dept: FAMILY MEDICINE | Facility: CLINIC | Age: 48
End: 2018-06-05

## 2018-06-05 DIAGNOSIS — G43.009 MIGRAINE WITHOUT AURA AND WITHOUT STATUS MIGRAINOSUS, NOT INTRACTABLE: ICD-10-CM

## 2018-06-05 RX ORDER — RIZATRIPTAN BENZOATE 10 MG/1
TABLET ORAL
Qty: 54 TABLET | Refills: 4 | Status: SHIPPED | OUTPATIENT
Start: 2018-06-05 | End: 2019-05-28

## 2018-06-05 NOTE — TELEPHONE ENCOUNTER
Reason for Call:  Medication or medication refill:    Do you use a [facility]?  Name of the pharmacy and phone number for the current request:  [facility] # [phone]. Fax # [phone]    Name of the medication requested: Rizatriptan     Other request:     Can we leave a detailed message on this number? YES    Phone number patient can be reached at: Home number on file [phone] (home)    Best Time:      Call taken on 6/5/2018 at 8:14 AM by Sherrie Storey

## 2018-06-05 NOTE — TELEPHONE ENCOUNTER
.Last Written Prescription Date:  6/15/17  Last Fill Quantity: 54,  # refills: 4   Last office visit: 4/20/2018 with prescribing provider:  4/20/18   Future Office Visit:

## 2018-07-12 ENCOUNTER — OFFICE VISIT (OUTPATIENT)
Dept: PODIATRY | Facility: CLINIC | Age: 48
End: 2018-07-12

## 2018-07-12 ENCOUNTER — RADIANT APPOINTMENT (OUTPATIENT)
Dept: GENERAL RADIOLOGY | Facility: CLINIC | Age: 48
End: 2018-07-12
Attending: PODIATRIST

## 2018-07-12 VITALS
SYSTOLIC BLOOD PRESSURE: 100 MMHG | BODY MASS INDEX: 23.32 KG/M2 | WEIGHT: 140 LBS | HEIGHT: 65 IN | DIASTOLIC BLOOD PRESSURE: 74 MMHG

## 2018-07-12 DIAGNOSIS — M20.42 HAMMER TOE OF LEFT FOOT: ICD-10-CM

## 2018-07-12 DIAGNOSIS — S99.922A TOE INJURY, LEFT, INITIAL ENCOUNTER: ICD-10-CM

## 2018-07-12 DIAGNOSIS — M77.9 CAPSULITIS: Primary | ICD-10-CM

## 2018-07-12 DIAGNOSIS — M77.42 METATARSALGIA OF LEFT FOOT: ICD-10-CM

## 2018-07-12 PROCEDURE — 73630 X-RAY EXAM OF FOOT: CPT | Mod: TC

## 2018-07-12 PROCEDURE — 99203 OFFICE O/P NEW LOW 30 MIN: CPT | Performed by: PODIATRIST

## 2018-07-12 ASSESSMENT — PAIN SCALES - GENERAL: PAINLEVEL: EXTREME PAIN (8)

## 2018-07-12 NOTE — LETTER
7/12/2018         RE: Earlene Mark  [address]  [place] [address]        Dear Colleague,    Thank you for referring your patient, Earlene Mark, to the [facility]. Please see a copy of my visit note below.    HPI:  Earlene Mark is a 47 year old female who is seen in consultation at the request of self    Pt presents for eval of:   (Onset, Location, L/R, Character, Treatments, Injury if yes)    XR Left foot today, 7/12/2018 6/1/2018, change in shoe gear for exercise started Left 2nd toe pain. Presents today with flat sandals, plantar, dorsal Left 2nd toe pain. Noticed space between 2nd and 3rd toe.  Constant, dull ache, pain 5  Intermittent, sharp, stabbing, burning, throbbing, numbness, tingling, pain 8  Metatarsal padding, massage, stretching, different shoes    Homemaker.      BMI is normal.    Patient to follow up with Primary Care provider regarding elevated blood pressure.    ROS:  10 point ROS neg other than the symptoms noted above in the HPI.    Patient Active Problem List   Diagnosis     FILEMON 3 - cervical intraepithelial neoplasia grade 3     Menorrhagia     CARDIOVASCULAR SCREENING; LDL GOAL LESS THAN 160     s/p Hysterectomy, cervical pathology benign, no need for further pap smears     Other insomnia     Migraine without aura and without status migrainosus, not intractable       PAST MEDICAL HISTORY:   Past Medical History:   Diagnosis Date     Abnormal Papanicolaou smear of cervix and cervical HPV 1990    none since LEEP     Allergic rhinitis, cause unspecified     ? 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 12/18/2009     DEPRESSIVE DISORDER NEC 6/6/2003     Headache(784.0) 10/21/92     Menorrhagia 12/18/2009     MENSTRUAL MIGRAINES 5/14/2007     s/p Hysterectomy, cervical pathology benign, no need for further pap smears 1/3/2013        PAST SURGICAL HISTORY:   Past Surgical History:   Procedure  Laterality Date     C  DELIVERY ONLY      , Low Cervical     COLPOSCOPY,LOOP ELECTRD CERVIX EXCIS       HC CONIZATION CERVIX,KNIFE/LASER  08    FILEMON 3     HC REMOVAL OF TONSILS,12+ Y/O       HYSTERECTOMY, VAGINAL  02/02/10    laparoscopic assisted vaginal.  benign cervix     TEST NOT FOUND  2001    Abdominoplasty (tummy tuck)        MEDICATIONS:   Current Outpatient Prescriptions:      MULTIPLE VITAMIN OR TABS, 1 TABLET ORALLY DAILY, Disp: , Rfl: 0     rizatriptan (MAXALT) 10 MG tablet, TAKE 1/2 TO 1 TABLET BY MOUTH AT ONSET OF MIGRAINE HEADACHE, MAY REPEAT IN 2 HOURS (DO NOT EXCEED 3 TABLETS (30MG) IN 24 HOURS), Disp: 54 tablet, Rfl: 4     Sodium Chloride-Sodium Bicarb (AYR SALINE NASAL RINSE NA), , Disp: , Rfl:      traZODone (DESYREL) 50 MG tablet, TAKE ONE AND ONE-HALF TABLET BY MOUTH AT BEDTIME, Disp: 135 tablet, Rfl: 3     ALLERGIES:    Allergies   Allergen Reactions     Morphine      Prochlorperazine      compazine -dystonic reaction     Seasonal Allergies         SOCIAL HISTORY:   Social History     Social History     Marital status:      Spouse name: Ty     Number of children: 3     Years of education: 14     Occupational History      Self     Social History Main Topics     Smoking status: Never Smoker     Smokeless tobacco: Never Used     Alcohol use No     Drug use: No     Sexual activity: Yes     Partners: Male     Birth control/ protection: Surgical      Comment:  had vasectomy  Oct. 11, 2002, she had a hysterectomy     Other Topics Concern      Service No     Blood Transfusions No     Caffeine Concern No     Occupational Exposure No     Hobby Hazards No     Sleep Concern No     Stress Concern Yes     son with seizures     Weight Concern No     Special Diet No     Back Care No     Exercise Yes     6 days a week at the gym     Bike Helmet Yes     Seat Belt Yes     Self-Exams No     Social History Narrative        FAMILY HISTORY:   Family History  "  Problem Relation Age of Onset     Neurologic Disorder Mother      migraines     Lipids Mother      Arthritis Mother      rheumatoid     Chronic Obstructive Pulmonary Disease Mother      smoker     Hypertension Father      Lipids Father      Blood Disease Father 51     DVT  at age 51 from probable PE     Neurologic Disorder Father      Muscular Dystrophy     Muscular Dystrophy Brother 19     Diabetes Paternal Grandmother      Diabetes Paternal Grandfather      Cancer Paternal Grandfather      colon     Neurologic Disorder Brother      Muscular Dystrophy  at age 19 from an MI     Cancer Paternal Uncle      colon     Asthma Son      Congenital Anomalies Son      epilepsy hydrocephilis        EXAM:Vitals: /74 (BP Location: Left arm, Cuff Size: Adult Regular)  Ht 5' 5.4\" (1.661 m)  Wt 140 lb (63.5 kg)  LMP 2010  BMI 23.01 kg/m2  BMI= Body mass index is 23.01 kg/(m^2).    General appearance: Patient is alert and fully cooperative with history & exam.  No sign of distress is noted during the visit.     Psychiatric: Affect is pleasant & appropriate.  Patient appears motivated to improve health.     Respiratory: Breathing is regular & unlabored while sitting.     HEENT: Hearing is intact to spoken word.  Speech is clear.  No gross evidence of visual impairment that would impact ambulation.     Vascular: DP & PT pulses are intact & regular bilaterally.  No significant edema or varicosities noted.  CFT and skin temperature is normal to both lower extremities.     Neurologic: Lower extremity sensation is intact to light touch.  No evidence of weakness or contracture in the lower extremities.  No evidence of neuropathy.    Dermatologic: Skin is intact to both lower extremities with adequate texture, turgor and tone about the integument.  No paronychia or evidence of soft tissue infection is noted.     Musculoskeletal: Patient is ambulatory without assistive device or brace.  Pain in the left second " metatarsal phalangeal joint but negative drawer maneuver of this joint.  Very subtle induration if any induration.  Pain is noted dorsal and plantar not just the plantar plate.  Very subtle medial deviation of the second digit towards the hallux.    Radiographs 3 views 7/12/18, left foot demonstrate subtle medial deviation of the second digit with mild contracture of the lesser digit.  Mild skew foot alignment with slightly elevated metatarsus adductus noted.     ASSESSMENT:       ICD-10-CM    1. Capsulitis M77.9 ORTHOTICS REFERRAL   2. Toe injury, left, initial encounter S99.922A XR Foot Left G/E 3 Views   3. Hammer toe of left foot M20.42 ORTHOTICS REFERRAL   4. Metatarsalgia of left foot M77.42 ORTHOTICS REFERRAL        PLAN:  Reviewed patient's chart in [facility].      7/12/2018   Order for orthotics to offload cut out under the second metatarsal head increase load through the arch provide stiffness to reduce time of weightbearing on the forefoot.  Written instructions regarding appropriate shoes for stiffer shoes more cushion about the forefoot avoid sandals and thin sole or minimal cushion.  Avoid barefoot walking   Discussed appropriate training to avoid aggravation of the ball the foot  Discussed injections oral anti-inflammatories and surgical reconstruction as well as risk of plantar plate rupture.  Follow-up in 5 weeks.  All questions answered    Herbert Roche DPM        Again, thank you for allowing me to participate in the care of your patient.        Sincerely,        Herbert Roche DPM

## 2018-07-12 NOTE — PROGRESS NOTES
HPI:  Earlene Mark is a 47 year old female who is seen in consultation at the request of self    Pt presents for eval of:   (Onset, Location, L/R, Character, Treatments, Injury if yes)    XR Left foot today, 2018, change in shoe gear for exercise started Left 2nd toe pain. Presents today with flat sandals, plantar, dorsal Left 2nd toe pain. Noticed space between 2nd and 3rd toe.  Constant, dull ache, pain 5  Intermittent, sharp, stabbing, burning, throbbing, numbness, tingling, pain 8  Metatarsal padding, massage, stretching, different shoes    Homemaker.      BMI is normal.    Patient to follow up with Primary Care provider regarding elevated blood pressure.    ROS:  10 point ROS neg other than the symptoms noted above in the HPI.    Patient Active Problem List   Diagnosis     FILEMON 3 - cervical intraepithelial neoplasia grade 3     Menorrhagia     CARDIOVASCULAR SCREENING; LDL GOAL LESS THAN 160     s/p Hysterectomy, cervical pathology benign, no need for further pap smears     Other insomnia     Migraine without aura and without status migrainosus, not intractable       PAST MEDICAL HISTORY:   Past Medical History:   Diagnosis Date     Abnormal Papanicolaou smear of cervix and cervical HPV     none since LEEP     Allergic rhinitis, cause unspecified     ? 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     DEPRESSIVE DISORDER NEC 2003     Headache(784.0) 10/21/92     Menorrhagia 2009     MENSTRUAL MIGRAINES 2007     s/p Hysterectomy, cervical pathology benign, no need for further pap smears 1/3/2013        PAST SURGICAL HISTORY:   Past Surgical History:   Procedure Laterality Date     C  DELIVERY ONLY      , Low Cervical     COLPOSCOPY,LOOP ELECTRD CERVIX EXCIS       HC CONIZATION CERVIX,KNIFE/LASER  08    FILEMON 3     HC REMOVAL OF TONSILS,12+ Y/O       HYSTERECTOMY, VAGINAL   02/02/10    laparoscopic assisted vaginal.  benign cervix     TEST NOT FOUND  11/2001    Abdominoplasty (tummy tuck)        MEDICATIONS:   Current Outpatient Prescriptions:      MULTIPLE VITAMIN OR TABS, 1 TABLET ORALLY DAILY, Disp: , Rfl: 0     rizatriptan (MAXALT) 10 MG tablet, TAKE 1/2 TO 1 TABLET BY MOUTH AT ONSET OF MIGRAINE HEADACHE, MAY REPEAT IN 2 HOURS (DO NOT EXCEED 3 TABLETS (30MG) IN 24 HOURS), Disp: 54 tablet, Rfl: 4     Sodium Chloride-Sodium Bicarb (AYR SALINE NASAL RINSE NA), , Disp: , Rfl:      traZODone (DESYREL) 50 MG tablet, TAKE ONE AND ONE-HALF TABLET BY MOUTH AT BEDTIME, Disp: 135 tablet, Rfl: 3     ALLERGIES:    Allergies   Allergen Reactions     Morphine      Prochlorperazine      compazine -dystonic reaction     Seasonal Allergies         SOCIAL HISTORY:   Social History     Social History     Marital status:      Spouse name: Ty     Number of children: 3     Years of education: 14     Occupational History      Self     Social History Main Topics     Smoking status: Never Smoker     Smokeless tobacco: Never Used     Alcohol use No     Drug use: No     Sexual activity: Yes     Partners: Male     Birth control/ protection: Surgical      Comment:  had vasectomy  Oct. 11, 2002, she had a hysterectomy     Other Topics Concern      Service No     Blood Transfusions No     Caffeine Concern No     Occupational Exposure No     Hobby Hazards No     Sleep Concern No     Stress Concern Yes     son with seizures     Weight Concern No     Special Diet No     Back Care No     Exercise Yes     6 days a week at the gym     Bike Helmet Yes     Seat Belt Yes     Self-Exams No     Social History Narrative        FAMILY HISTORY:   Family History   Problem Relation Age of Onset     Neurologic Disorder Mother      migraines     Lipids Mother      Arthritis Mother      rheumatoid     Chronic Obstructive Pulmonary Disease Mother      smoker     Hypertension Father      Lipids Father      Blood  "Disease Father 51     DVT  at age 51 from probable PE     Neurologic Disorder Father      Muscular Dystrophy     Muscular Dystrophy Brother 19     Diabetes Paternal Grandmother      Diabetes Paternal Grandfather      Cancer Paternal Grandfather      colon     Neurologic Disorder Brother      Muscular Dystrophy  at age 19 from an MI     Cancer Paternal Uncle      colon     Asthma Son      Congenital Anomalies Son      epilepsy hydrocephilis        EXAM:Vitals: /74 (BP Location: Left arm, Cuff Size: Adult Regular)  Ht 5' 5.4\" (1.661 m)  Wt 140 lb (63.5 kg)  LMP 2010  BMI 23.01 kg/m2  BMI= Body mass index is 23.01 kg/(m^2).    General appearance: Patient is alert and fully cooperative with history & exam.  No sign of distress is noted during the visit.     Psychiatric: Affect is pleasant & appropriate.  Patient appears motivated to improve health.     Respiratory: Breathing is regular & unlabored while sitting.     HEENT: Hearing is intact to spoken word.  Speech is clear.  No gross evidence of visual impairment that would impact ambulation.     Vascular: DP & PT pulses are intact & regular bilaterally.  No significant edema or varicosities noted.  CFT and skin temperature is normal to both lower extremities.     Neurologic: Lower extremity sensation is intact to light touch.  No evidence of weakness or contracture in the lower extremities.  No evidence of neuropathy.    Dermatologic: Skin is intact to both lower extremities with adequate texture, turgor and tone about the integument.  No paronychia or evidence of soft tissue infection is noted.     Musculoskeletal: Patient is ambulatory without assistive device or brace.  Pain in the left second metatarsal phalangeal joint but negative drawer maneuver of this joint.  Very subtle induration if any induration.  Pain is noted dorsal and plantar not just the plantar plate.  Very subtle medial deviation of the second digit towards the " hallux.    Radiographs 3 views 7/12/18, left foot demonstrate subtle medial deviation of the second digit with mild contracture of the lesser digit.  Mild skew foot alignment with slightly elevated metatarsus adductus noted.     ASSESSMENT:       ICD-10-CM    1. Capsulitis M77.9 ORTHOTICS REFERRAL   2. Toe injury, left, initial encounter S99.922A XR Foot Left G/E 3 Views   3. Hammer toe of left foot M20.42 ORTHOTICS REFERRAL   4. Metatarsalgia of left foot M77.42 ORTHOTICS REFERRAL        PLAN:  Reviewed patient's chart in [facility].      7/12/2018   Order for orthotics to offload cut out under the second metatarsal head increase load through the arch provide stiffness to reduce time of weightbearing on the forefoot.  Written instructions regarding appropriate shoes for stiffer shoes more cushion about the forefoot avoid sandals and thin sole or minimal cushion.  Avoid barefoot walking   Discussed appropriate training to avoid aggravation of the ball the foot  Discussed injections oral anti-inflammatories and surgical reconstruction as well as risk of plantar plate rupture.  Follow-up in 5 weeks.  All questions answered    Herbert Roche DPM

## 2018-07-12 NOTE — MR AVS SNAPSHOT
After Visit Summary   7/12/2018    Earlene Mark    MRN: [id number]           Patient Information     Date Of Birth          1970        Visit Information        Provider Department      7/12/2018 2:45 PM Herbert Roche, [facility]        Today's Diagnoses     Toe injury, left, initial encounter    -  1    Capsulitis        Hammer toe of left foot        Metatarsalgia of left foot          Care Instructions    capsulitis of 2nd toe or metatarsal phalangeal joint.  Or pre dislocation syndrome hammertoe   Reliable shoe stores: To maximize your experience and provide the best possible fit.  Be sure to show them your foot concerns and tell them Dr. Roche sent you.      Stores listed in bold have only athletic shoes, and stores that are not bold are mostly casual or variety of shoes    [employer]  [address] [place] [address]  [phone]     [employer] [facility]  [address] [place] [address]  [phone]    [facility] [employer] [facility]  [address] [place] [address]  [phone]    [employer]  [address] [facility] [address]  [phone]    [employer]  [address] [place], [place] [address] [phone]    [employer]  [address] [place] [address]  [phone]                         [employer] Locations:     [address] [place] [address]   [phone]     [address] [place] [address]   [phone]     [address] [place] [address]   [phone]     [address] [place] [address]   [phone]     [address] [place] [address]   [phone]     [address] [place] [address]   [phone]     [address]   [place] [address]   [phone]     [address]   [place], [place] [address] [phone]             How to find reasonable shoes          The correct width     "Correct Fitting    Correct Length      Foot Distortion    Posture Distortion                          Torsional Rigidity      Grasp behind the heel and underneath the foot and twist      Bad    Excessive torsion/twist in midfoot     Less torsion/twist in midfoot is better                   Heel Counter Rigidity      Grasp just above   midsole and squeeze      Bad    Soft heel counter      Good    Rigid Heel Counter      Flexion Rigidity      Grasp shoe and bend from forefoot to rearfoot                        Follow-ups after your visit        Additional Services     ORTHOTICS REFERRAL       [facility] scheduling staff may contact patient to arrange appointments for casting of orthotics and often do not leave messages.  The patient may call this number for scheduling at their convenience. Scheduling Phone [phone].      One pair custom functional foot orthotics.   Flexible polypropylene shell with 1/8\" Spenco cushioned top cover, crepe rearfoot post, medial density arch fill, MOODY bottom cover.  Aerobic model.                  Who to contact     If you have questions or need follow up information about today's clinic visit or your schedule please contact [facility] directly at [phone].  Normal or non-critical lab and imaging results will be communicated to you by [employer], letter or phone within 4 business days after the clinic has received the results. If you do not hear from us within 7 days, please contact the clinic through [employer] or phone. If you have a critical or abnormal lab result, we will notify you by phone as soon as possible.  Submit refill requests through [employer] or call your pharmacy and they will forward the refill request to us. Please allow 3 business days for your refill to be completed.          Additional Information About Your Visit        [employer] Information     [employer] gives you secure access to your electronic health record. If you see a primary care provider, you can " "also send messages to your care team and make appointments. If you have questions, please call your primary care clinic.  If you do not have a primary care provider, please call [phone] and they will assist you.        Care EveryWhere ID     This is your Care EveryWhere ID. This could be used by other organizations to access your [place] medical records  [id number]        Your Vitals Were     Height Last Period BMI (Body Mass Index)             5' 5.4\" (1.661 m) 02/01/2010 23.01 kg/m2          Blood Pressure from Last 3 Encounters:   07/12/18 100/74   04/20/18 102/54   08/01/17 123/73    Weight from Last 3 Encounters:   07/12/18 140 lb (63.5 kg)   04/20/18 139 lb (63 kg)   02/13/16 130 lb (59 kg)              We Performed the Following     ORTHOTICS REFERRAL        Primary Care Provider Office Phone # Fax #    Kushal Linda [phone]       [address] [place] [address]        Equal Access to Services     [facility]: Hadii servando ku hadasho Somichele, waaxda luqadaha, qaybta kaalmada adecarisa, alyssa holden . So [facility] [phone].    ATENCIÓN: Si habla español, tiene a almeida disposición servicios gratuitos de asistencia lingüística. [facility] [phone].    We comply with applicable federal civil rights laws and [place] laws. We do not discriminate on the basis of race, color, national origin, age, disability, sex, sexual orientation, or gender identity.            Thank you!     Thank you for choosing [facility]  for your care. Our goal is always to provide you with excellent care. Hearing back from our patients is one way we can continue to improve our services. Please take a few minutes to complete the written survey that you may receive in the mail after your visit with us. Thank you!             Your Updated Medication List - Protect others around you: Learn how to safely use, store and throw away your medicines at " www.disposemymeds.org.          This list is accurate as of 7/12/18  3:24 PM.  Always use your most recent med list.                   Brand Name Dispense Instructions for use Diagnosis    AYR SALINE NASAL RINSE NA           Multiple vitamin Tabs      1 TABLET ORALLY DAILY        rizatriptan 10 MG tablet    MAXALT    54 tablet    TAKE 1/2 TO 1 TABLET BY MOUTH AT ONSET OF MIGRAINE HEADACHE, MAY REPEAT IN 2 HOURS (DO NOT EXCEED 3 TABLETS (30MG) IN 24 HOURS)    Migraine without aura and without status migrainosus, not intractable       traZODone 50 MG tablet    DESYREL    135 tablet    TAKE ONE AND ONE-HALF TABLET BY MOUTH AT BEDTIME    Other insomnia

## 2018-07-12 NOTE — PATIENT INSTRUCTIONS
capsulitis of 2nd toe or metatarsal phalangeal joint.  Or pre dislocation syndrome hammertoe   Reliable shoe stores: To maximize your experience and provide the best possible fit.  Be sure to show them your foot concerns and tell them Dr. Roche sent you.      Stores listed in bold have only athletic shoes, and stores that are not bold are mostly casual or variety of shoes    [employer]  [address] [place] [address]  [phone]     [employer]  [address] [place] [address]  [phone]     [employer] [facility]  [address] [place] [address]  [phone]    [employer]  [address] [facility] [address]  [phone]    [employer]  [address] [place] [address]  [phone]    [employer]  [address] [place] [address]  [phone]                         [employer] Locations:     [address] [place] [address]   [phone]     [address] [place] [address] [address] [place] [address]   [phone]     [address] [place] [address]   [phone]     [address] [place] [address]   [phone]     [address] [facility] [place] [address]   [phone]     [address] [facility] [place] [address]   [phone]     [address] [facility] [address]   [place] [address]   [phone]     [address] [facility]   [place], [place] [address] [phone]             How to find reasonable shoes          The correct width    Correct Fitting    Correct Length      Foot Distortion    Posture Distortion                          Torsional Rigidity      Grasp behind the heel and underneath the foot and twist      Bad    Excessive torsion/twist in midfoot     Less torsion/twist in midfoot is better                   Heel Counter Rigidity      Grasp just above   midsole and squeeze      Bad    Soft heel counter      Good    Rigid Heel Counter      Flexion Rigidity      Grasp shoe  and bend from forefoot to rearfoot

## 2018-08-28 DIAGNOSIS — G47.09 OTHER INSOMNIA: ICD-10-CM

## 2018-08-28 NOTE — TELEPHONE ENCOUNTER
"Requested Prescriptions   Pending Prescriptions Disp Refills     traZODone (DESYREL) 50 MG tablet [Pharmacy Med Name: TRAZODONE HCL 50MG TABS] 135 tablet 3    Last Written Prescription Date:  1/31/2018  Last Fill Quantity: 135,  # refills: 3   Last office visit: 4/20/2018 with prescribing provider:  Dr. Linda   Future Office Visit:   Sig: TAKE ONE AND ONE-HALF TABLETS BY MOUTH AT BEDTIME    Serotonin Modulators Passed    8/28/2018  4:11 PM       Passed - Recent (12 mo) or future (30 days) visit within the authorizing provider's specialty    Patient had office visit in the last 12 months or has a visit in the next 30 days with authorizing provider or within the authorizing provider's specialty.  See \"Patient Info\" tab in inbasket, or \"Choose Columns\" in Meds & Orders section of the refill encounter.           Passed - Patient is age 18 or older       Passed - No active pregnancy on record       Passed - No positive pregnancy test in past 12 months          "

## 2018-08-29 RX ORDER — TRAZODONE HYDROCHLORIDE 50 MG/1
TABLET, FILM COATED ORAL
Qty: 135 TABLET | Refills: 3 | Status: SHIPPED | OUTPATIENT
Start: 2018-08-29 | End: 2019-03-01

## 2018-09-23 ENCOUNTER — OFFICE VISIT (OUTPATIENT)
Dept: URGENT CARE | Facility: RETAIL CLINIC | Age: 48
End: 2018-09-23

## 2018-09-23 VITALS
SYSTOLIC BLOOD PRESSURE: 104 MMHG | HEART RATE: 55 BPM | OXYGEN SATURATION: 99 % | TEMPERATURE: 97.7 F | DIASTOLIC BLOOD PRESSURE: 60 MMHG

## 2018-09-23 DIAGNOSIS — J01.90 ACUTE SINUSITIS WITH SYMPTOMS > 10 DAYS: Primary | ICD-10-CM

## 2018-09-23 DIAGNOSIS — R06.2 WHEEZING: ICD-10-CM

## 2018-09-23 PROCEDURE — 99213 OFFICE O/P EST LOW 20 MIN: CPT | Performed by: PHYSICIAN ASSISTANT

## 2018-09-23 RX ORDER — ALBUTEROL SULFATE 90 UG/1
1-2 AEROSOL, METERED RESPIRATORY (INHALATION) EVERY 4 HOURS PRN
Qty: 1 INHALER | Refills: 0 | Status: SHIPPED | OUTPATIENT
Start: 2018-09-23 | End: 2020-06-08

## 2018-09-23 RX ORDER — ALBUTEROL SULFATE 0.83 MG/ML
2.5 SOLUTION RESPIRATORY (INHALATION) EVERY 4 HOURS PRN
Qty: 30 VIAL | Refills: 0 | Status: SHIPPED | OUTPATIENT
Start: 2018-09-23

## 2018-09-23 NOTE — MR AVS SNAPSHOT
After Visit Summary   9/23/2018    Earlene Mark    MRN: [id number]           Patient Information     Date Of Birth          1970        Visit Information        Provider Department      9/23/2018 9:40 AM Gricelda Esteban PA-C [facility]        Today's Diagnoses     Acute sinusitis with symptoms > 10 days    -  1    Wheezing          Care Instructions    Take antibiotic as directed  Call [facility] [place] if needed albuterol inhaler or albuterol neb  Apply warm facial compresses/packs for 5-10 minutes three times daily.  Drink plenty of fluids- 6 to 10 glasses of liquids each day. Rest.  Saline drops or nasal sprays as needed.   May use netti pot as needed. Do not use tap water. May use filtered or distilled water.  Steam treatments or humidifier.  Sudafed (decongestant) for congestion.  Mucinex (guaifenesin) to thin out secretions.  Tylenol or ibuprofen as needed for pain or fever  Follow up at your primary care clinic for increasing pain, high fever, vision changes, worsening symptoms, or no relief from symptoms after 7-10 days.  Please follow up with primary care provider if not improving, worsening or new symptoms or for any adverse reactions to medications.       For persistent ringing/tinnitus - consider seeing primary care to discuss if needing hearing test/audiogram              Follow-ups after your visit        Who to contact     You can reach your care team any time of the day by calling [phone].  Notification of test results:  If you have an abnormal lab result, we will notify you by phone as soon as possible.         Additional Information About Your Visit        [employer] Information     [employer] gives you secure access to your electronic health record. If you see a primary care provider, you can also send messages to your care team and make appointments. If you have questions, please call your primary care clinic.  If you do not have a primary care  provider, please call [phone] and they will assist you.        Care EveryWhere ID     This is your Care EveryWhere ID. This could be used by other organizations to access your [facility] medical records  [id number]        Your Vitals Were     Pulse Temperature Last Period Pulse Oximetry          55 97.7  F (36.5  C) (Tympanic) 02/01/2010 99%         Blood Pressure from Last 3 Encounters:   09/23/18 104/60   07/12/18 100/74   04/20/18 102/54    Weight from Last 3 Encounters:   07/12/18 140 lb (63.5 kg)   04/20/18 139 lb (63 kg)   02/13/16 130 lb (59 kg)              Today, you had the following     No orders found for display         Today's Medication Changes          These changes are accurate as of 9/23/18 10:18 AM.  If you have any questions, ask your nurse or doctor.               Start taking these medicines.        Dose/Directions    * albuterol 108 (90 Base) MCG/ACT inhaler   Commonly known as:  PROAIR HFA/PROVENTIL HFA/VENTOLIN HFA   Used for:  Wheezing        Dose:  1-2 puff   Inhale 1-2 puffs into the lungs every 4 hours as needed for wheezing Hold on file. Will call to fill if needed   Quantity:  1 Inhaler   Refills:  0       * albuterol (2.5 MG/3ML) 0.083% neb solution   Used for:  Wheezing        Dose:  2.5 mg   Take 1 vial (2.5 mg) by nebulization every 4 hours as needed for wheezing Hold on file. Will call if needed   Quantity:  30 vial   Refills:  0       amoxicillin-clavulanate 875-125 MG per tablet   Commonly known as:  AUGMENTIN   Used for:  Acute sinusitis with symptoms > 10 days        Dose:  1 tablet   Take 1 tablet by mouth 2 times daily for 10 days   Quantity:  20 tablet   Refills:  0       * Notice:  This list has 2 medication(s) that are the same as other medications prescribed for you. Read the directions carefully, and ask your doctor or other care provider to review them with you.         Where to get your medicines      These medications were sent to [id number] - [place], [place] -  [address]  [address] [facility] [address]     Phone:  [phone]     amoxicillin-clavulanate 875-125 MG per tablet         These medications were sent to [facility] - [place], [place] - [address] [address] [facility] AT [facility] [address]  [address] [facility] [address]     Phone:  [phone]     albuterol (2.5 MG/3ML) 0.083% neb solution    albuterol 108 (90 Base) MCG/ACT inhaler                Primary Care Provider Office Phone # Fax #    Kushal Linda [phone]       [address] [place] [address]        Equal Access to Services     CRESCENCIO DAVIS AH: Hadii servando zhouo Somichele, waaxda luqadaha, qaybta kaalmada adeegyada, alyssa morris. So [facility] [phone].    ATENCIÓN: Si habla español, tiene a almeida disposición servicios gratuitos de asistencia lingüística. Llame al [phone].    We comply with applicable federal civil rights laws and [place] laws. We do not discriminate on the basis of race, color, national origin, age, disability, sex, sexual orientation, or gender identity.            Thank you!     Thank you for choosing [facility]  for your care. Our goal is always to provide you with excellent care. Hearing back from our patients is one way we can continue to improve our services. Please take a few minutes to complete the written survey that you may receive in the mail after your visit with us. Thank you!             Your Updated Medication List - Protect others around you: Learn how to safely use, store and throw away your medicines at www.disposemymeds.org.          This list is accurate as of 9/23/18 10:18 AM.  Always use your most recent med list.                   Brand Name Dispense Instructions for use Diagnosis    * albuterol 108 (90 Base) MCG/ACT inhaler    PROAIR HFA/PROVENTIL HFA/VENTOLIN HFA    1 Inhaler    Inhale 1-2 puffs into the lungs every 4 hours as needed for  wheezing Hold on file. Will call to fill if needed    Wheezing       * albuterol (2.5 MG/3ML) 0.083% neb solution     30 vial    Take 1 vial (2.5 mg) by nebulization every 4 hours as needed for wheezing Hold on file. Will call if needed    Wheezing       amoxicillin-clavulanate 875-125 MG per tablet    AUGMENTIN    20 tablet    Take 1 tablet by mouth 2 times daily for 10 days    Acute sinusitis with symptoms > 10 days       AYR SALINE NASAL RINSE NA           dextromethorphan-guaiFENesin  MG per 12 hr tablet    MUCINEX DM     Take 1 tablet by mouth every 12 hours        Multiple vitamin Tabs      1 TABLET ORALLY DAILY        rizatriptan 10 MG tablet    MAXALT    54 tablet    TAKE 1/2 TO 1 TABLET BY MOUTH AT ONSET OF MIGRAINE HEADACHE, MAY REPEAT IN 2 HOURS (DO NOT EXCEED 3 TABLETS (30MG) IN 24 HOURS)    Migraine without aura and without status migrainosus, not intractable       traZODone 50 MG tablet    DESYREL    135 tablet    TAKE ONE AND ONE-HALF TABLETS BY MOUTH AT BEDTIME    Other insomnia       * Notice:  This list has 2 medication(s) that are the same as other medications prescribed for you. Read the directions carefully, and ask your doctor or other care provider to review them with you.

## 2018-09-23 NOTE — PROGRESS NOTES
Chief Complaint   Patient presents with     Sinus Problem     x 2 1/2 weeks, sinus drainage in back of throat, started out as a cold now alot of sinuspain and pressure, green/brown mucus, no fevers     Cough     x 2 1/2 weeks, cough sometimes productive, ears are ringing and some pain and pressure x 4 days       SUBJECTIVE:  Earlene Mark is a 48 year old female here with concerns about sinus infection.  She states onset of symptoms were 2.5 week(s) ago.  She has had maxillary pressure. Course of illness is worsening. Severity moderate  Current and Associated symptoms: nasal congestion, rhinorrhea, cough , facial pain/pressure, headache and post-nasal drainage, some wheezing intermittently during day, worse at night  Tinnitus x over 1 yr, worse recently  Predisposing factors include HX of sinusitis, migraines, recent illness. Recent treatment has included: mucinex - stomach upset, saline rinse, using albuterol inhaler/neb    Past Medical History:   Diagnosis Date     Abnormal Papanicolaou smear of cervix and cervical HPV 1990    none since LEEP     Allergic rhinitis, cause unspecified     ? 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 12/18/2009     DEPRESSIVE DISORDER NEC 6/6/2003     Headache(784.0) 10/21/92     Menorrhagia 12/18/2009     MENSTRUAL MIGRAINES 5/14/2007     s/p Hysterectomy, cervical pathology benign, no need for further pap smears 1/3/2013     Current Outpatient Prescriptions   Medication Sig Dispense Refill     dextromethorphan-guaiFENesin (MUCINEX DM)  MG per 12 hr tablet Take 1 tablet by mouth every 12 hours       MULTIPLE VITAMIN OR TABS 1 TABLET ORALLY DAILY  0     rizatriptan (MAXALT) 10 MG tablet TAKE 1/2 TO 1 TABLET BY MOUTH AT ONSET OF MIGRAINE HEADACHE, MAY REPEAT IN 2 HOURS (DO NOT EXCEED 3 TABLETS (30MG) IN 24 HOURS) 54 tablet 4     Sodium Chloride-Sodium Bicarb (AYR SALINE NASAL RINSE NA)        traZODone (DESYREL) 50 MG  tablet TAKE ONE AND ONE-HALF TABLETS BY MOUTH AT BEDTIME 135 tablet 3     [DISCONTINUED] traZODone (DESYREL) 50 MG tablet TAKE ONE AND ONE-HALF TABLET BY MOUTH AT BEDTIME 135 tablet 3        Allergies   Allergen Reactions     Morphine      Prochlorperazine      compazine -dystonic reaction     Seasonal Allergies         History   Smoking Status     Never Smoker   Smokeless Tobacco     Never Used       ROS:  CONSTITUTIONAL:NEGATIVE for fever, chills  ENT/MOUTH: POSITIVE for ear pressure, Hx sinus infections, nasal congestion, rhinorrhea-purulent and sinus pressure and NEGATIVE for sore throat  RESP:POSITIVE for cough-productive and wheezing     OBJECTIVE:  /60 (BP Location: Left arm)  Pulse 55  Temp 97.7  F (36.5  C) (Tympanic)  LMP 02/01/2010  SpO2 99%  Exam:GENERAL APPEARANCE: healthy, alert and no distress  EYES: conjunctiva clear  HENT: ear canals clear. TMs gray, neutral position, serous effusion. nasal turbinates erythematous, swollen, rhinorrhea yellow, oral mucous membranes moist, no erythema noted, maxillary sinus tenderness. Post nasal drainage noted in the pharynx.  Nose and mouth without ulcers, erythema or lesions  NECK: supple, nontender, no lymphadenopathy  RESP: lungs clear to auscultation - no rales, rhonchi or wheezes  CV: regular rates and rhythm, normal S1 S2, no murmur noted  SKIN: no suspicious lesions or rashes    ASSESSMENT:  (J01.90) Acute sinusitis with symptoms > 10 days  (primary encounter diagnosis)  (R06.2) Wheezing    PLAN:  Would like to try augmentin.   Amoxicillin-clavulanate (AUGMENTIN) 875-125 MG per tablet  albuterol (PROAIR HFA/PROVENTIL HFA/VENTOLIN HFA) 108 (90 Base) MCG/ACT inhaler,   albuterol (2.5 MG/3ML) 0.083% neb solution  Take antibiotic as directed with food  Probiotic during antibiotic course  Call [facility] if needed albuterol inhaler or albuterol neb - are on file  Apply warm facial compresses/packs for 5-10 minutes three times daily.  Drink  plenty of fluids- 6 to 10 glasses of liquids each day. Rest.  Saline drops or nasal sprays as needed.   May use netti pot as needed. Do not use tap water. May use filtered or distilled water.  Steam treatments or humidifier.  Sudafed (decongestant) for congestion.  Mucinex (guaifenesin) to thin out secretions.  Tylenol or ibuprofen as needed for pain or fever  Follow up at your primary care clinic for increasing pain, high fever, vision changes, worsening symptoms, or no relief from symptoms after 7-10 days.  Please follow up with primary care provider if not improving, worsening or new symptoms or for any adverse reactions to medications.     For persistent ringing/tinnitus - consider seeing primary care to discuss if needing hearing test/audiogram    Gricelda Esteban PA-C  [facility]

## 2018-09-23 NOTE — PATIENT INSTRUCTIONS
Take antibiotic as directed  Call [facility] if needed albuterol inhaler or albuterol neb  Apply warm facial compresses/packs for 5-10 minutes three times daily.  Drink plenty of fluids- 6 to 10 glasses of liquids each day. Rest.  Saline drops or nasal sprays as needed.   May use netti pot as needed. Do not use tap water. May use filtered or distilled water.  Steam treatments or humidifier.  Sudafed (decongestant) for congestion.  Mucinex (guaifenesin) to thin out secretions.  Tylenol or ibuprofen as needed for pain or fever  Follow up at your primary care clinic for increasing pain, high fever, vision changes, worsening symptoms, or no relief from symptoms after 7-10 days.  Please follow up with primary care provider if not improving, worsening or new symptoms or for any adverse reactions to medications.       For persistent ringing/tinnitus - consider seeing primary care to discuss if needing hearing test/audiogram

## 2018-10-31 ENCOUNTER — TELEPHONE (OUTPATIENT)
Dept: FAMILY MEDICINE | Facility: CLINIC | Age: 48
End: 2018-10-31

## 2018-10-31 NOTE — TELEPHONE ENCOUNTER
We have no records of [facility] clinic visits. Spoke with patient and let her know. She will call them to get the info.

## 2018-10-31 NOTE — TELEPHONE ENCOUNTER
Pt calling to have someone look through her chart or incoming records in her chart, to see how many times she has been seen at the [facility] for ear issues.  please call to advise.  thanks

## 2018-11-16 ENCOUNTER — OFFICE VISIT (OUTPATIENT)
Dept: FAMILY MEDICINE | Facility: OTHER | Age: 48
End: 2018-11-16

## 2018-11-16 VITALS
HEIGHT: 65 IN | DIASTOLIC BLOOD PRESSURE: 70 MMHG | SYSTOLIC BLOOD PRESSURE: 106 MMHG | RESPIRATION RATE: 16 BRPM | OXYGEN SATURATION: 98 % | TEMPERATURE: 97.9 F | BODY MASS INDEX: 23.56 KG/M2 | WEIGHT: 141.4 LBS | HEART RATE: 58 BPM

## 2018-11-16 DIAGNOSIS — J01.90 ACUTE BACTERIAL RHINOSINUSITIS: Primary | ICD-10-CM

## 2018-11-16 DIAGNOSIS — B96.89 ACUTE BACTERIAL RHINOSINUSITIS: Primary | ICD-10-CM

## 2018-11-16 PROCEDURE — 99214 OFFICE O/P EST MOD 30 MIN: CPT | Performed by: NURSE PRACTITIONER

## 2018-11-16 NOTE — MR AVS SNAPSHOT
After Visit Summary   11/16/2018    Earlene Mark    MRN: [id number]           Patient Information     Date Of Birth          1970        Visit Information        Provider Department      11/16/2018 9:40 AM Autumn Gonzalez APRN CNP [facility]        Today's Diagnoses     Acute bacterial rhinosinusitis    -  1      Care Instructions    Recommend using Neti pot and flonase for sinuses. If symptoms worsen of do not improve in 3-5 days may use antibiotic. Please take antibiotic with a probiotic or yogurt (3 small containers) daily not directly with the antibiotic for optimal good bacterial protection.     Thank you  Autumn Gonzalez CNP              Follow-ups after your visit        Who to contact     If you have questions or need follow up information about today's clinic visit or your schedule please contact [facility] directly at [phone].  Normal or non-critical lab and imaging results will be communicated to you by MyChart, letter or phone within 4 business days after the clinic has received the results. If you do not hear from us within 7 days, please contact the clinic through [employer] or phone. If you have a critical or abnormal lab result, we will notify you by phone as soon as possible.  Submit refill requests through [employer] or call your pharmacy and they will forward the refill request to us. Please allow 3 business days for your refill to be completed.          Additional Information About Your Visit        MyChart Information     [employer] gives you secure access to your electronic health record. If you see a primary care provider, you can also send messages to your care team and make appointments. If you have questions, please call your primary care clinic.  If you do not have a primary care provider, please call [phone] and they will assist you.        Care EveryWhere ID     This is your Care EveryWhere ID. This could be used by other  "organizations to access your [facility] medical records  [id number]        Your Vitals Were     Pulse Temperature Respirations Height Last Period Pulse Oximetry    58 97.9  F (36.6  C) (Temporal) 16 5' 4.5\" (1.638 m) 02/01/2010 98%    BMI (Body Mass Index)                   23.9 kg/m2            Blood Pressure from Last 3 Encounters:   11/16/18 106/70   09/23/18 104/60   07/12/18 100/74    Weight from Last 3 Encounters:   11/16/18 141 lb 6.4 oz (64.1 kg)   07/12/18 140 lb (63.5 kg)   04/20/18 139 lb (63 kg)              Today, you had the following     No orders found for display         Today's Medication Changes          These changes are accurate as of 11/16/18 10:29 AM.  If you have any questions, ask your nurse or doctor.               Start taking these medicines.        Dose/Directions    amoxicillin-clavulanate 875-125 MG per tablet   Commonly known as:  AUGMENTIN   Used for:  Acute bacterial rhinosinusitis   Started by:  Autumn Gonzalez, JOSTIN CRUZ        Dose:  1 tablet   Take 1 tablet by mouth 2 times daily   Quantity:  20 tablet   Refills:  0            Where to get your medicines      [facility] [address] [place] [address] [facility] AT [facility] [address]  [address] [facility] [address]     Phone:  [phone]     amoxicillin-clavulanate 875-125 MG per tablet                Primary Care Provider Office Phone # Fax #    Kushal Linda [phone] [address] [place] [address]        Equal Access to Services     [facility] SUSAN AH: Hadmargot Andre, waflorencioda luqadaha, qaybta kaalmada ulises, alyssa morris. So [facility] [phone].    ATENCIÓN: Si habla español, tiene a almeida disposición servicios gratuitos de asistencia lingüística. Llame al [phone].    We comply with applicable federal civil rights laws and [place] laws. We do not discriminate on the basis of " race, color, national origin, age, disability, sex, sexual orientation, or gender identity.            Thank you!     Thank you for choosing [facility]  for your care. Our goal is always to provide you with excellent care. Hearing back from our patients is one way we can continue to improve our services. Please take a few minutes to complete the written survey that you may receive in the mail after your visit with us. Thank you!             Your Updated Medication List - Protect others around you: Learn how to safely use, store and throw away your medicines at www.disposemymeds.org.          This list is accurate as of 11/16/18 10:29 AM.  Always use your most recent med list.                   Brand Name Dispense Instructions for use Diagnosis    * albuterol 108 (90 Base) MCG/ACT inhaler    PROAIR HFA/PROVENTIL HFA/VENTOLIN HFA    1 Inhaler    Inhale 1-2 puffs into the lungs every 4 hours as needed for wheezing Hold on file. Will call to fill if needed    Wheezing       * albuterol (2.5 MG/3ML) 0.083% neb solution     30 vial    Take 1 vial (2.5 mg) by nebulization every 4 hours as needed for wheezing Hold on file. Will call if needed    Wheezing       amoxicillin-clavulanate 875-125 MG per tablet    AUGMENTIN    20 tablet    Take 1 tablet by mouth 2 times daily    Acute bacterial rhinosinusitis       AYR SALINE NASAL RINSE NA           Multiple vitamin Tabs      1 TABLET ORALLY DAILY        rizatriptan 10 MG tablet    MAXALT    54 tablet    TAKE 1/2 TO 1 TABLET BY MOUTH AT ONSET OF MIGRAINE HEADACHE, MAY REPEAT IN 2 HOURS (DO NOT EXCEED 3 TABLETS (30MG) IN 24 HOURS)    Migraine without aura and without status migrainosus, not intractable       traZODone 50 MG tablet    DESYREL    135 tablet    TAKE ONE AND ONE-HALF TABLETS BY MOUTH AT BEDTIME    Other insomnia       * Notice:  This list has 2 medication(s) that are the same as other medications prescribed for you. Read the directions carefully, and  ask your doctor or other care provider to review them with you.

## 2018-11-16 NOTE — PROGRESS NOTES
SUBJECTIVE:   Earlene Mark is a 48 year old female who presents to clinic today for the following health issues:      HPI  Acute Illness   Acute illness concerns: sinus pressure   Onset: ongoing        Would like referral to ENT     Fever: no    Chills/Sweats: no    Headache (location?): yes     Sinus Pressure:YES    Conjunctivitis:  YES-vision feels skewed    Ear Pain: YES- ringing and water in ears     Rhinorrhea: YES    Congestion: YES    Sore Throat: YES- lots of drainage      Cough: YES-productive of yellow sputum    Wheeze: YES    Decreased Appetite: YES- slightly     Nausea: no    Vomiting: no    Diarrhea:  no    Dysuria/Freq.: no    Fatigue/Achiness: YES- slight fatigue, no body aches     Sick/Strep Exposure: no      Therapies Tried and outcome: neti pot, advil cold and sinus    States she has been having frequent  Sinus infections and is now having symptoms of PND, a little lightheadedness,   10/2018 [facility] she was given an inhaler and nebulizer she has been using both every am using neb and inhaler 1 puff per day (ventolin) she is using her spouses medication. She states she is wheezing with this.   She states she is in clinic for either ear infection or sinus infection she is doing this about 4 times per year. She is concerned about the amount of ear infections and sinus infections she is having and feels that she is never quite asymptomatic.   She is sensitive to smoke when she is around smokers she will feel malaise and will have respiratory symptoms of chest congestion.     2 of her 3 children have asthma and her spouse. Mother is a smoker and has COPD and asthma.     Problem list and histories reviewed & adjusted, as indicated.  Additional history: as documented    Patient Active Problem List   Diagnosis     FILEMON 3 - cervical intraepithelial neoplasia grade 3     Menorrhagia     CARDIOVASCULAR SCREENING; LDL GOAL LESS THAN 160     s/p Hysterectomy, cervical pathology benign, no  need for further pap smears     Other insomnia     Migraine without aura and without status migrainosus, not intractable     Past Surgical History:   Procedure Laterality Date     C  DELIVERY ONLY      , Low Cervical     COLPOSCOPY,LOOP ELECTRD CERVIX EXCIS       HC CONIZATION CERVIX,KNIFE/LASER  08    FILEMON 3     HC REMOVAL OF TONSILS,12+ Y/O       HYSTERECTOMY, VAGINAL  02/02/10    laparoscopic assisted vaginal.  benign cervix     TEST NOT FOUND  2001    Abdominoplasty (tummy tuck)       Social History   Substance Use Topics     Smoking status: Never Smoker     Smokeless tobacco: Never Used     Alcohol use No     Family History   Problem Relation Age of Onset     Neurologic Disorder Mother      migraines     Lipids Mother      Arthritis Mother      rheumatoid     Chronic Obstructive Pulmonary Disease Mother      smoker     Hypertension Father      Lipids Father      Blood Disease Father 51     DVT  at age 51 from probable PE     Neurologic Disorder Father      Muscular Dystrophy     Muscular Dystrophy Brother 19     Diabetes Paternal Grandmother      Diabetes Paternal Grandfather      Cancer Paternal Grandfather      colon     Neurologic Disorder Brother      Muscular Dystrophy  at age 19 from an MI     Cancer Paternal Uncle      colon     Asthma Son      Congenital Anomalies Son      epilepsy hydrocephilis         Current Outpatient Prescriptions   Medication Sig Dispense Refill     albuterol (2.5 MG/3ML) 0.083% neb solution Take 1 vial (2.5 mg) by nebulization every 4 hours as needed for wheezing Hold on file. Will call if needed 30 vial 0     albuterol (PROAIR HFA/PROVENTIL HFA/VENTOLIN HFA) 108 (90 Base) MCG/ACT inhaler Inhale 1-2 puffs into the lungs every 4 hours as needed for wheezing Hold on file. Will call to fill if needed 1 Inhaler 0     amoxicillin-clavulanate (AUGMENTIN) 875-125 MG per tablet Take 1 tablet by mouth 2 times daily 20 tablet 0     MULTIPLE  "VITAMIN OR TABS 1 TABLET ORALLY DAILY  0     rizatriptan (MAXALT) 10 MG tablet TAKE 1/2 TO 1 TABLET BY MOUTH AT ONSET OF MIGRAINE HEADACHE, MAY REPEAT IN 2 HOURS (DO NOT EXCEED 3 TABLETS (30MG) IN 24 HOURS) 54 tablet 4     Sodium Chloride-Sodium Bicarb (AYR SALINE NASAL RINSE NA)        traZODone (DESYREL) 50 MG tablet TAKE ONE AND ONE-HALF TABLETS BY MOUTH AT BEDTIME 135 tablet 3     Allergies   Allergen Reactions     Morphine      Prochlorperazine      compazine -dystonic reaction     Seasonal Allergies      BP Readings from Last 3 Encounters:   11/16/18 106/70   09/23/18 104/60   07/12/18 100/74    Wt Readings from Last 3 Encounters:   11/16/18 141 lb 6.4 oz (64.1 kg)   07/12/18 140 lb (63.5 kg)   04/20/18 139 lb (63 kg)                  Labs reviewed in EPIC    ROS:  Constitutional, HEENT, cardiovascular, pulmonary, gi and gu systems are negative, except as otherwise noted.    OBJECTIVE:     /70  Pulse 58  Temp 97.9  F (36.6  C) (Temporal)  Resp 16  Ht 5' 4.5\" (1.638 m)  Wt 141 lb 6.4 oz (64.1 kg)  LMP 02/01/2010  SpO2 98%  BMI 23.9 kg/m2  Body mass index is 23.9 kg/(m^2).  GENERAL: healthy, alert and no distress  EYES: Eyes grossly normal to inspection, PERRL and conjunctivae and sclerae normal  HENT: normal cephalic/atraumatic, ear canals and TM's normal, nose and mouth without ulcers or lesions, nasal mucosa edematous , rhinorrhea yellow, oropharynx clear, oral mucous membranes moist and sinuses: maxillary, frontal tenderness on bilateral  NECK: bilateral anterior cervical adenopathy, no asymmetry, masses, or scars and thyroid normal to palpation  RESP: lungs clear to auscultation - no rales, rhonchi or wheezes  CV: regular rate and rhythm, normal S1 S2, no S3 or S4, no murmur, click or rub, no peripheral edema and peripheral pulses strong  SKIN: no suspicious lesions or rashes  LYMPH: no cervical, supraclavicular, axillary, or inguinal adenopathy    ASSESSMENT/PLAN:       1. Acute bacterial " rhinosinusitis  Discussed home treatment - Due to length of symptoms, will treat   - Discussed antibiotic use, duration, and side effects  - Recommend rest and fluids  - Can continue with nyquil/dayquil   - Hand out given     The patient indicats understanding of these issues and agrees with the plan.     Follow up: PRN      - amoxicillin-clavulanate (AUGMENTIN) 875-125 MG per tablet; Take 1 tablet by mouth 2 times daily  Dispense: 20 tablet; Refill: 0      Discussed history of ENT evaluation and it was recommended that she have sinus surgery she never completed this discussed using home treatments with neti-pot, flonase, humidity to help remove thick mucous. She is paying out of pocket and does not want to see specialty or have testing done. Discussed risk of asthma, allergies if indicated could have clinic spirometry completed. She is not having wheezing. Discussed she should not be using others medication    50 minutes where spent with patient of which 40 minutes were spent on counseling on home treatment history and disease pathophysiology.         Patient Instructions   Recommend using Neti pot and flonase for sinuses. If symptoms worsen of do not improve in 3-5 days may use antibiotic. Please take antibiotic with a probiotic or yogurt (3 small containers) daily not directly with the antibiotic for optimal good bacterial protection.     Thank you  Autumn Gonzalez [facility]

## 2018-11-16 NOTE — PATIENT INSTRUCTIONS
Recommend using Neti pot and flonase for sinuses. If symptoms worsen of do not improve in 3-5 days may use antibiotic. Please take antibiotic with a probiotic or yogurt (3 small containers) daily not directly with the antibiotic for optimal good bacterial protection.     Thank you  Autumn Gonzalez CNP

## 2019-03-01 DIAGNOSIS — G47.09 OTHER INSOMNIA: ICD-10-CM

## 2019-03-04 RX ORDER — TRAZODONE HYDROCHLORIDE 50 MG/1
TABLET, FILM COATED ORAL
Qty: 135 TABLET | Refills: 1 | Status: SHIPPED | OUTPATIENT
Start: 2019-03-04 | End: 2019-10-14

## 2019-03-04 NOTE — TELEPHONE ENCOUNTER
"Patient changed Pharmacy.     Trazodone  Last Written Prescription Date:  08/29/2018  Last Fill Quantity: 135,  # refills: 3   Last office visit: 04/20/2018 with prescribing provider:  Maddi   Future Office Visit:  None  Prescription approved per [facility] Refill Protocol.    Requested Prescriptions   Pending Prescriptions Disp Refills     traZODone (DESYREL) 50 MG tablet 135 tablet 3    Serotonin Modulators Passed - 3/1/2019  3:32 PM       Passed - Recent (12 mo) or future (30 days) visit within the authorizing provider's specialty    Patient had office visit in the last 12 months or has a visit in the next 30 days with authorizing provider or within the authorizing provider's specialty.  See \"Patient Info\" tab in inbasket, or \"Choose Columns\" in Meds & Orders section of the refill encounter.         Passed - Medication is active on med list       Passed - Patient is age 18 or older       Passed - No active pregnancy on record       Passed - No positive pregnancy test in past 12 months      Radha Toney RN   "

## 2019-03-11 ENCOUNTER — TELEPHONE (OUTPATIENT)
Dept: FAMILY MEDICINE | Facility: CLINIC | Age: 49
End: 2019-03-11

## 2019-03-11 DIAGNOSIS — R10.11 RUQ ABDOMINAL PAIN: Primary | ICD-10-CM

## 2019-03-12 NOTE — TELEPHONE ENCOUNTER
We should set her up a RUQ ultrasound to look at the gallbladder.  We should also do a hepatic profile.    Electronically signed by:  Kushal Linda M.D.  3/12/2019

## 2019-03-12 NOTE — TELEPHONE ENCOUNTER
If she wants to come in for an annual exam, she is due for a mammogram, lipids, a glucose and yes we can talk about GB issues. I will do a lab for the gallbladder too (hepatic profile) and if we need to can get an US if we need to.  Is she having pain after eating or why does she think she needs a gall bladder study?  We can order those labs for her.     Electronically signed by:  Kushal Linda M.D.  3/11/2019

## 2019-03-12 NOTE — TELEPHONE ENCOUNTER
Spoke to patient, she states yes, she is having pain after eating. Within one hour of eating she has major pain under right breast and the pain goes into her back. Feels like severe heartburn. the pain lasts 20-30 minutes. Patient states she eats healthy and exercises 6 days a week. She has cut out starches and does not eat red meat. Fried food and sweets are the worst. At this point her diet is restricted to oatmeal in the morning and salad in the afternoon and evening. Everything else causes these flare ups.     Patient states she does not have insurance and is wondering if she can just go see a surgeon, or start with lab work done and go from there.

## 2019-03-12 NOTE — TELEPHONE ENCOUNTER
Okay to leave message per patient phone call. Left message informing patient that Dr. Linda suggests starting with an ultrasound and lab work. When patient returns call, please assist in scheduling on the lab schedule and with radiology for the US.  Orders for both have been placed

## 2019-03-14 ENCOUNTER — TELEPHONE (OUTPATIENT)
Dept: FAMILY MEDICINE | Facility: CLINIC | Age: 49
End: 2019-03-14

## 2019-03-14 NOTE — TELEPHONE ENCOUNTER
Reason for Call:  Other question about shot    Detailed comments: is wondering if she can get a shot for sciatic nerve pain from Dr Linda.  Call the [facility] provider and that she is leaving until May and she would like to be seen sooner.  If not can she be referred to someone else.    Phone Number Patient can be reached at: Cell number on file:    Telephone Information:   Mobile [phone]       Best Time:     Can we leave a detailed message on this number? YES    Call taken on 3/14/2019 at 9:28 AM by Aparna Rivera

## 2019-03-14 NOTE — TELEPHONE ENCOUNTER
I am not sure what kind of shot she wants to get.  We could give her a shot of Toradol 60mg IM but other than that I am not sure if that is what she is looking for.  She could see Dr. Arizmendi in the mean time if she wants to see someone else while Princess is out.      Electronically signed by:  Kushal Linda M.D.  3/14/2019

## 2019-03-15 ENCOUNTER — ANCILLARY PROCEDURE (OUTPATIENT)
Dept: ULTRASOUND IMAGING | Facility: OTHER | Age: 49
End: 2019-03-15
Attending: FAMILY MEDICINE

## 2019-03-15 DIAGNOSIS — R10.11 RUQ ABDOMINAL PAIN: ICD-10-CM

## 2019-03-15 LAB
ALBUMIN SERPL-MCNC: 3.7 G/DL (ref 3.4–5)
ALP SERPL-CCNC: 61 U/L (ref 40–150)
ALT SERPL W P-5'-P-CCNC: 38 U/L (ref 0–50)
AST SERPL W P-5'-P-CCNC: 30 U/L (ref 0–45)
BILIRUB DIRECT SERPL-MCNC: 0.2 MG/DL (ref 0–0.2)
BILIRUB SERPL-MCNC: 0.6 MG/DL (ref 0.2–1.3)
PROT SERPL-MCNC: 7 G/DL (ref 6.8–8.8)

## 2019-03-15 PROCEDURE — 80076 HEPATIC FUNCTION PANEL: CPT | Performed by: FAMILY MEDICINE

## 2019-03-15 PROCEDURE — 76705 ECHO EXAM OF ABDOMEN: CPT

## 2019-03-15 PROCEDURE — 36415 COLL VENOUS BLD VENIPUNCTURE: CPT | Performed by: FAMILY MEDICINE

## 2019-03-19 ENCOUNTER — TELEPHONE (OUTPATIENT)
Dept: FAMILY MEDICINE | Facility: CLINIC | Age: 49
End: 2019-03-19

## 2019-03-19 NOTE — TELEPHONE ENCOUNTER
Please let patient know that the ultrasound which was normal except of a small benign looking simple cyst on the left liver.  If her symptoms persist or get worse, recommend to follow-up.

## 2019-03-19 NOTE — TELEPHONE ENCOUNTER
Reason for Call:  Request for results:    Name of test or procedure: US     Date of test of procedure: 3.15    Location of the test or procedure: [facility] to leave the result message on voice mail or with a family member? YES    Phone number Patient can be reached at:  Home number on file [phone] (home)    Additional comments: Please advise if there is someone that can call with the results of US from 3.15 in Dr Linda's absence. Patient states you can Mychart her and/or leave a message on her voicemail.     Call taken on 3/19/2019 at 9:17 AM by Medina Terrell

## 2019-03-20 NOTE — TELEPHONE ENCOUNTER
Patient will wait till viviana returns to get answers and will come in it it gets worse.    Nica Choe MA 3/20/2019

## 2019-03-20 NOTE — TELEPHONE ENCOUNTER
Pt is calling again in regards to this. She is hoping she can speak with a covering provider. Could the liver cyst be causing her pain? Is the liver cyst close to the gall bladder? Please advise. # [phone].  Thank you,  Cheryle Lincoln- Pt Rep.

## 2019-03-20 NOTE — TELEPHONE ENCOUNTER
This need to be addressed by the ordering provider.  I did not exam her so on able to give her the definite answer or any suggestion.  If she has a concern or if the pain is getting worse, please be sure to follow-up.

## 2019-03-30 NOTE — TELEPHONE ENCOUNTER
These simple cysts are very common and typically do not cause pain.  She doesn't have any evidence of gallstones but if she is getting pain following meals, then it could be from Gallbladder dysfunction so we could order a HIDA scan for her. Her liver blood tests were completely normal too.     Electronically signed by:  Kushal Linda M.D.  3/30/2019

## 2019-04-01 NOTE — TELEPHONE ENCOUNTER
Spoke to patient she will watch the symptoms and call us back if she needs the scan done  Nica Choe MA 4/1/2019

## 2019-05-28 DIAGNOSIS — G43.009 MIGRAINE WITHOUT AURA AND WITHOUT STATUS MIGRAINOSUS, NOT INTRACTABLE: ICD-10-CM

## 2019-05-30 RX ORDER — RIZATRIPTAN BENZOATE 10 MG/1
TABLET ORAL
Qty: 54 TABLET | Refills: 3 | Status: SHIPPED | OUTPATIENT
Start: 2019-05-30 | End: 2020-07-16

## 2019-05-30 NOTE — TELEPHONE ENCOUNTER
"Routing to schedulers for yearly physical appointment with PCP.     Maxalt  Last Written Prescription Date:  06/05/2018  Last Fill Quantity: 54,  # refills: 4   Last office visit: 11/16/2018 with prescribing provider:  Carlos   Future Office Visit:  None  Prescription approved per [facility] Refill Protocol.    Requested Prescriptions   Pending Prescriptions Disp Refills     rizatriptan (MAXALT) 10 MG tablet [Pharmacy Med Name: RIZATRIPTAN 10 MG TABLET] 54 tablet 3     Sig: Take 1/2 to 1 tablet by mouth at onset of migraine, may repeat in 2 hours -Max 3 tablet(s) in 24 hours       Serotonin Agonists Failed - 5/28/2019  1:34 PM        Failed - Serotonin Agonist request needs review.     Please review patient's record. If patient has had 8 or more treatments in the past month, please forward to provider.        Failed - Recent (12 mo) or future (30 days) visit within the authorizing provider's specialty     Patient had office visit in the last 12 months or has a visit in the next 30 days with authorizing provider or within the authorizing provider's specialty.  See \"Patient Info\" tab in inbasket, or \"Choose Columns\" in Meds & Orders section of the refill encounter.          Passed - Blood pressure under 140/90 in past 12 months     BP Readings from Last 3 Encounters:   11/16/18 106/70   09/23/18 104/60   07/12/18 100/74           Passed - Medication is active on med list        Passed - Patient is age 18 or older        Passed - No active pregnancy on record        Passed - No positive pregnancy test in past 12 months      Radha Sol RN   "

## 2019-05-30 NOTE — TELEPHONE ENCOUNTER
Called patient and she states they don't have insurance so she wasn't planning on having a physical until April 2020.

## 2019-08-01 NOTE — TELEPHONE ENCOUNTER
Will route to covering provider. Berkley Faye CMA ([facility])     I have personally provided the amount of critical care time documented below concurrently with the resident/fellow.  This time excludes time spent on separate procedures and time spent teaching. I have reviewed the resident’s/fellow’s documentation and I agree with the assessment and plan of care

## 2019-08-29 ENCOUNTER — OFFICE VISIT (OUTPATIENT)
Dept: URGENT CARE | Facility: RETAIL CLINIC | Age: 49
End: 2019-08-29

## 2019-08-29 VITALS
TEMPERATURE: 98.1 F | HEART RATE: 51 BPM | SYSTOLIC BLOOD PRESSURE: 108 MMHG | OXYGEN SATURATION: 97 % | DIASTOLIC BLOOD PRESSURE: 70 MMHG

## 2019-08-29 DIAGNOSIS — J30.2 SEASONAL ALLERGIC RHINITIS, UNSPECIFIED TRIGGER: ICD-10-CM

## 2019-08-29 DIAGNOSIS — J01.90 ACUTE SINUSITIS WITH SYMPTOMS > 10 DAYS: Primary | ICD-10-CM

## 2019-08-29 PROCEDURE — 99213 OFFICE O/P EST LOW 20 MIN: CPT | Performed by: PHYSICIAN ASSISTANT

## 2019-08-29 RX ORDER — FLUTICASONE PROPIONATE 50 MCG
2 SPRAY, SUSPENSION (ML) NASAL DAILY
Qty: 16 ML | Refills: 3 | Status: SHIPPED | OUTPATIENT
Start: 2019-08-29 | End: 2021-03-10

## 2019-08-29 RX ORDER — ALBUTEROL SULFATE 90 UG/1
2 AEROSOL, METERED RESPIRATORY (INHALATION) EVERY 4 HOURS PRN
Qty: 1 INHALER | Refills: 0 | Status: SHIPPED | OUTPATIENT
Start: 2019-08-29

## 2019-08-29 ASSESSMENT — ENCOUNTER SYMPTOMS
DIAPHORESIS: 0
RHINORRHEA: 0
HEADACHES: 1
CHILLS: 0
WHEEZING: 1
FEVER: 0
SORE THROAT: 1
ADENOPATHY: 0
SINUS PRESSURE: 1
FACIAL SWELLING: 0
EYE REDNESS: 0
COUGH: 1
EYE DISCHARGE: 0
SINUS PAIN: 1
FATIGUE: 1

## 2019-08-29 NOTE — PATIENT INSTRUCTIONS
Start Flonase 1 spray in each nostril in the end of July next year.  Augmentin (amoxicillin-clavulanate) 875mg twice daily for 7 days as directed.  Now, use Flonase (fluticasone) 2 sprays in each nostril daily until symptoms resolve, then continue 1 spray in each nostril for at least 5 more days.  Take Tylenol or an NSAID such as ibuprofen or naproxen as needed for pain.  May use netti pot with bottled or distilled water and saline packets to flush sinuses.  Sudafed (pseudoephedrine) behind the pharmacist counter for 3-5 days helps relieve congestion.  Afrin (oxymetazoline) nasal spray twice daily for 3 days. Stop after 3 days.  Mucinex (guiafenesin) thins mucus and may help it to loosen more quickly  Saline drops or nasal sprays may loosen mucus.  Sit in the bathroom with the door closed and hot shower running to loosen mucus.  Contact primary care clinic if you do not have any relief from your symptoms after 10 days.  Present to emergency room for significantly increasing pain, persistent high fever >102F, swelling/redness around your eyes, changes in your vision or ability to move your eyes, altered mental status or a severe headache.

## 2019-08-29 NOTE — PROGRESS NOTES
Chief Complaint   Patient presents with     Sinus Problem     sinus pressure x 2 week, green yellow mucus, no fevers     Cough     x 6 days, sometimes productive, sore throat x 6 days, had laryngitis x 2 days, some weezing,  using inhaler     SUBJECTIVE:  Earlene Mark is a 49 year old female here with concerns about a sinus infection.  She states onset of symptoms was 2 weeks ago.    Course of illness is worsening.   Severity moderate  She has had maxillary pressure as well as nasal congestion, cough , sore throat, facial pain/pressure, headache and post-nasal drainage  Predisposing factors include history of recurrent sinusitis- per her chart, this same time every year.   Recent treatment has included: Mucinex    Past Medical History:   Diagnosis Date     Abnormal Papanicolaou smear of cervix and cervical HPV 1990    none since LEEP     Allergic rhinitis, cause unspecified     ? 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 12/18/2009     DEPRESSIVE DISORDER NEC 6/6/2003     Headache(784.0) 10/21/92     Menorrhagia 12/18/2009     MENSTRUAL MIGRAINES 5/14/2007     s/p Hysterectomy, cervical pathology benign, no need for further pap smears 1/3/2013       Current Outpatient Medications on File Prior to Visit:  albuterol (PROAIR HFA/PROVENTIL HFA/VENTOLIN HFA) 108 (90 Base) MCG/ACT inhaler Inhale 1-2 puffs into the lungs every 4 hours as needed for wheezing Hold on file. Will call to fill if needed   Fluticasone Propionate (FLONASE NA)    guaiFENesin (MUCINEX PO)    MULTIPLE VITAMIN OR TABS 1 TABLET ORALLY DAILY   rizatriptan (MAXALT) 10 MG tablet Take 1/2 to 1 tablet by mouth at onset of migraine, may repeat in 2 hours -Max 3 tablet(s) in 24 hours   Sodium Chloride-Sodium Bicarb (AYR SALINE NASAL RINSE NA)    traZODone (DESYREL) 50 MG tablet TAKE ONE AND ONE-HALF TABLETS BY MOUTH AT BEDTIME   albuterol (2.5 MG/3ML) 0.083% neb solution Take 1 vial (2.5 mg) by  nebulization every 4 hours as needed for wheezing Hold on file. Will call if needed (Patient not taking: Reported on 8/29/2019)     No current facility-administered medications on file prior to visit.   Social History     Tobacco Use     Smoking status: Never Smoker     Smokeless tobacco: Never Used   Substance Use Topics     Alcohol use: No     Alcohol/week: 6.0 oz     Allergies   Allergen Reactions     Morphine      Prochlorperazine      compazine -dystonic reaction     Seasonal Allergies      Review of Systems   Constitutional: Positive for fatigue. Negative for chills, diaphoresis and fever.   HENT: Positive for congestion, postnasal drip, sinus pressure, sinus pain and sore throat. Negative for ear pain, facial swelling and rhinorrhea.    Eyes: Negative for discharge and redness.   Respiratory: Positive for cough and wheezing.    Neurological: Positive for headaches.   Hematological: Negative for adenopathy.     OBJECTIVE:  /70 (BP Location: Left arm)   Pulse 51   Temp 98.1  F (36.7  C) (Oral)   LMP 02/01/2010   SpO2 97%   GENERAL APPEARANCE: healthy, alert and no distress  EYES: PERRL, conjunctiva clear  HENT: Pain with palpation over frontal and maxillary sinuses. Ear canals normal TMs with mild serous effusions bilaterally. Nasal turbinates edematous and boggy with a blue hue bilaterally. Posterior pharynx is not erythematous.  NECK: supple, nontender, no lymphadenopathy  RESP: lungs clear to auscultation - no rales, rhonchi or wheezes  CV: regular rates and rhythm, normal S1 S2, no murmur noted    ASSESSMENT:    ICD-10-CM    1. Acute sinusitis with symptoms > 10 days J01.90    2. Seasonal allergic rhinitis, unspecified trigger J30.2      PLAN:   Patient Instructions   Start Flonase 1 spray in each nostril in the end of July next year.  Augmentin (amoxicillin-clavulanate) 875mg twice daily for 7 days as directed.  Now, use Flonase (fluticasone) 2 sprays in each nostril daily until symptoms resolve,  then continue 1 spray in each nostril for at least 5 more days.  Take Tylenol or an NSAID such as ibuprofen or naproxen as needed for pain.  May use netti pot with bottled or distilled water and saline packets to flush sinuses.  Sudafed (pseudoephedrine) behind the pharmacist counter for 3-5 days helps relieve congestion.  Afrin (oxymetazoline) nasal spray twice daily for 3 days. Stop after 3 days.  Mucinex (guiafenesin) thins mucus and may help it to loosen more quickly  Saline drops or nasal sprays may loosen mucus.  Sit in the bathroom with the door closed and hot shower running to loosen mucus.  Contact primary care clinic if you do not have any relief from your symptoms after 10 days.  Present to emergency room for significantly increasing pain, persistent high fever >102F, swelling/redness around your eyes, changes in your vision or ability to move your eyes, altered mental status or a severe headache.    Follow up with primary care provider with any problems, questions or concerns or if symptoms worsen or fail to improve. Patient agreed to plan and verbalized understanding.    DANUTA Wilson [facility]

## 2019-10-08 DIAGNOSIS — G47.09 OTHER INSOMNIA: ICD-10-CM

## 2019-10-08 NOTE — TELEPHONE ENCOUNTER
Trazodone 50 mg tabs      Last Written Prescription Date:  03/04/19  Last Fill Quantity: 135,   # refills: 1  Last Office Visit: 04/20/18 with Dr. Linda 11/16/18 with Autumn Gonzalez  Future Office visit:       Routing refill request to provider for review/approval because:  Drug not on the [facility], [facility] or [facility] refill protocol or controlled substance

## 2019-10-14 RX ORDER — TRAZODONE HYDROCHLORIDE 50 MG/1
TABLET, FILM COATED ORAL
Qty: 135 TABLET | Refills: 1 | Status: SHIPPED | OUTPATIENT
Start: 2019-10-14 | End: 2020-04-06

## 2019-10-22 ENCOUNTER — RX ONLY (RX ONLY)
Age: 49
End: 2019-10-22

## 2019-10-22 RX ORDER — LEVOCETIRIZINE DIHYDROCHLORIDE 5 MG
TABLET ORAL
Qty: 90 | Refills: 1 | Status: ERX | COMMUNITY
Start: 2019-10-22

## 2019-12-08 ENCOUNTER — HEALTH MAINTENANCE LETTER (OUTPATIENT)
Age: 49
End: 2019-12-08

## 2019-12-12 ENCOUNTER — OFFICE VISIT (OUTPATIENT)
Dept: NEUROSURGERY | Facility: OTHER | Age: 49
End: 2019-12-12

## 2019-12-12 VITALS
BODY MASS INDEX: 22.99 KG/M2 | TEMPERATURE: 98 F | SYSTOLIC BLOOD PRESSURE: 112 MMHG | DIASTOLIC BLOOD PRESSURE: 64 MMHG | HEIGHT: 65 IN | HEART RATE: 56 BPM | WEIGHT: 138 LBS

## 2019-12-12 DIAGNOSIS — M54.16 LUMBAR RADICULOPATHY: Primary | ICD-10-CM

## 2019-12-12 PROCEDURE — 99204 OFFICE O/P NEW MOD 45 MIN: CPT | Performed by: PHYSICIAN ASSISTANT

## 2019-12-12 ASSESSMENT — MIFFLIN-ST. JEOR: SCORE: 1243.9

## 2019-12-12 ASSESSMENT — PAIN SCALES - GENERAL: PAINLEVEL: EXTREME PAIN (8)

## 2019-12-12 NOTE — PROGRESS NOTES
Dr. Gustabo Willett  [facility] Visit      CC: Low back pain    Primary care Provider: Kushal Linda      Reason For Visit:   Self referral      HPI: Earlene Mark is a 49 year old female who presents for evaluation of low back pain x 1.5 years. Pain is located in right low back and radiates down posterior right leg into anterior knee. Describes the pain as a constant ache with intermittent sharpness. She does have numbness in right foot. Pain is worsened with burpees or bending to put on underwear. Pain is alleviated with oral prednisone. She does exercise daily, which is important to her. She has tried changing her diet, chiropractic, stretching, psoas belt, massage, and physical therapy at [place] (through [facility]). No recent imaging or injections. Denies bladder/bowel incontinence.    Current pain: 7/10 At worst: 9/10    Past Medical History:   Diagnosis Date     Abnormal Papanicolaou smear of cervix and cervical HPV     none since LEEP     Allergic rhinitis, cause unspecified     ? 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    Past Surgical History:   Procedure Laterality Date     C  DELIVERY ONLY      , Low Cervical     COLPOSCOPY,LOOP ELECTRD CERVIX EXCIS       HC CONIZATION CERVIX,KNIFE/LASER  08    FILEMON 3     HC REMOVAL OF TONSILS,12+ Y/O       HYSTERECTOMY, VAGINAL  02/02/10    laparoscopic assisted vaginal.  benign cervix     TEST NOT FOUND  2001    Abdominoplasty (tummy tuck)     Past Surgical History reviewed with patient during visit.    Current Outpatient Medications   Medication      albuterol (2.5 MG/3ML) 0.083% neb solution     albuterol (PROAIR HFA/PROVENTIL HFA/VENTOLIN HFA) 108 (90 Base) MCG/ACT inhaler     albuterol (PROAIR HFA/PROVENTIL HFA/VENTOLIN HFA) 108 (90 Base) MCG/ACT inhaler     fluticasone (FLONASE) 50 MCG/ACT nasal spray     Fluticasone Propionate (FLONASE NA)     guaiFENesin (MUCINEX PO)     MULTIPLE VITAMIN OR TABS     rizatriptan (MAXALT) 10 MG tablet     Sodium Chloride-Sodium Bicarb (AYR SALINE NASAL RINSE NA)     traZODone (DESYREL) 50 MG tablet     No current facility-administered medications for this visit.        Allergies   Allergen Reactions     Morphine      Prochlorperazine      compazine -dystonic reaction     Seasonal Allergies        Social History     Socioeconomic History     Marital status:      Spouse name: Ty     Number of children: 3     Years of education: 14     Highest education level: Not on file   Occupational History     Employer: SELF   Social Needs     Financial resource strain: Not on file     Food insecurity:     Worry: Not on file     Inability: Not on file     Transportation needs:     Medical: Not on file     Non-medical: Not on file   Tobacco Use     Smoking status: Never Smoker     Smokeless tobacco: Never Used   Substance and Sexual Activity     Alcohol use: No     Alcohol/week: 10.0 standard drinks     Drug use: No     Sexual activity: Yes     Partners: Male     Birth control/protection: Surgical     Comment:  had vasectomy  Oct. 11, 2002, she had a hysterectomy   Lifestyle     Physical activity:     Days per week: Not on file     Minutes per session: Not on file     Stress: Not on file   Relationships     Social connections:     Talks on phone: Not on file     Gets together: Not on file     Attends Buddhism service: Not on file     Active member of club or organization: Not on file     Attends meetings of clubs or organizations: Not on file     Relationship status: Not on file     Intimate partner violence:     Fear of  current or ex partner: Not on file     Emotionally abused: Not on file     Physically abused: Not on file     Forced sexual activity: Not on file   Other Topics Concern      Service No     Blood Transfusions No     Caffeine Concern No     Occupational Exposure No     Hobby Hazards No     Sleep Concern No     Stress Concern Yes     Comment: son with seizures     Weight Concern No     Special Diet No     Back Care No     Exercise Yes     Comment: 6 days a week at the gym     Bike Helmet Yes     Seat Belt Yes     Self-Exams No   Social History Narrative     Not on file       Family History   Problem Relation Age of Onset     Neurologic Disorder Mother         migraines     Lipids Mother      Arthritis Mother         rheumatoid     Chronic Obstructive Pulmonary Disease Mother         smoker     Hypertension Father      Lipids Father      Blood Disease Father 51        DVT  at age 51 from probable PE     Neurologic Disorder Father         Muscular Dystrophy     Muscular Dystrophy Brother 19     Diabetes Paternal Grandmother      Diabetes Paternal Grandfather      Cancer Paternal Grandfather         colon     Neurologic Disorder Brother         Muscular Dystrophy  at age 19 from an MI     Cancer Paternal Uncle         colon     Asthma Son      Congenital Anomalies Son         epilepsy hydrocephilis          ROS: 10 point ROS neg other than the symptoms noted above in the HPI.    Vital Signs: LMP 2010     Examination:  Constitutional:  Alert, well nourished, NAD.  HEENT: Normocephalic, atraumatic.   Pulmonary:  Without shortness of breath, normal effort.   Lymph: no lymphadenopathy to low back or LE.   Integumentary: Skin is free of rashes or lesions.   Cardiovascular:  No pitting edema of BLE.      Neurological:  Awake  Alert  Oriented x 3  Speech clear  Cranial nerves II - XII grossly intact  PERRL  EOMI  Face symmetric  Tongue midline  Motor exam   Hip Flexor:                Right: 5/5  Left:   5/5  Quadriceps:              Right:  5/5  Left:  5/5  Hamstrings:              Right:  5/5  Left:  5/5  Gastroc Soleus:        Right:  5/5  Left:  5/5  Tib/Ant:                      Right:  5/5  Left:  5/5  EHL:                          Right:  5/5  Left:  5/5       Sensation normal to bilateral lower extremities.    Reflexes are 2+ in the patellar and Achilles. There is no clonus. Downgoing Babinski.  Musculoskeletal:  Gait: Able to stand from a seated position. Normal non-antalgic, non-myelopathic gait.  Able to heel/toe walk without loss of balance  Lumbar examination reveals no tenderness of the spine or paraspinous muscles.  Hip height is symmetrical. Negative SI joint, sciatic notch or greater trochanteric tenderness to palpation bilaterally.  Straight leg raise is negative bilaterally.      Imaging:   No recent imaging    Assessment/Plan:   Earlene Mark is a 49 year old female who presents for evaluation of low back pain x 1.5 years. Pain is located in right low back and radiates down posterior right leg into anterior knee. Describes the pain as a constant ache with intermittent sharpness. She does have numbness in right foot. She has tried changing her diet, chiropractic, stretching, psoas belt, massage, and physical therapy at [place] (through [facility]).  Will obtain lumbar MRI and call her with results. Patient voiced understanding and agreement.            Ruby Tamayo PA-C  [facility]  [address] [address]    Tel [phone]  Pager [phone]

## 2019-12-12 NOTE — LETTER
2019         RE: Earlene Mark  [address]  [facility] [address]        Dear Colleague,    Thank you for referring your patient, Earlene Mark, to the [facility]. Please see a copy of my visit note below.    Dr. Gustabo SHEPARD [facility] Visit      CC: Low back pain    Primary care Provider: Kushal Linda      Reason For Visit:   Self referral      HPI: Earlene Mark is a 49 year old female who presents for evaluation of low back pain x 1.5 years. Pain is located in right low back and radiates down posterior right leg into anterior knee. Describes the pain as a constant ache with intermittent sharpness. She does have numbness in right foot. Pain is worsened with burpees or bending to put on underwear. Pain is alleviated with oral prednisone. She does exercise daily, which is important to her. She has tried changing her diet, chiropractic, stretching, psoas belt, massage, and physical therapy at [place] (through [facility]). No recent imaging or injections. Denies bladder/bowel incontinence.    Current pain: 7/10 At worst: 9/10    Past Medical History:   Diagnosis Date     Abnormal Papanicolaou smear of cervix and cervical HPV     none since LEEP     Allergic rhinitis, cause unspecified     ? 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    Past Surgical History:   Procedure Laterality Date     C  DELIVERY ONLY      , Low Cervical     COLPOSCOPY,LOOP ELECTRD CERVIX EXCIS       HC CONIZATION CERVIX,KNIFE/LASER  08    FILEMON 3     HC REMOVAL OF TONSILS,12+ Y/O        HYSTERECTOMY, VAGINAL  02/02/10    laparoscopic assisted vaginal.  benign cervix     TEST NOT FOUND  11/2001    Abdominoplasty (tummy tuck)     Past Surgical History reviewed with patient during visit.    Current Outpatient Medications   Medication     albuterol (2.5 MG/3ML) 0.083% neb solution     albuterol (PROAIR HFA/PROVENTIL HFA/VENTOLIN HFA) 108 (90 Base) MCG/ACT inhaler     albuterol (PROAIR HFA/PROVENTIL HFA/VENTOLIN HFA) 108 (90 Base) MCG/ACT inhaler     fluticasone (FLONASE) 50 MCG/ACT nasal spray     Fluticasone Propionate (FLONASE NA)     guaiFENesin (MUCINEX PO)     MULTIPLE VITAMIN OR TABS     rizatriptan (MAXALT) 10 MG tablet     Sodium Chloride-Sodium Bicarb (AYR SALINE NASAL RINSE NA)     traZODone (DESYREL) 50 MG tablet     No current facility-administered medications for this visit.        Allergies   Allergen Reactions     Morphine      Prochlorperazine      compazine -dystonic reaction     Seasonal Allergies        Social History     Socioeconomic History     Marital status:      Spouse name: Hitesh     Number of children: 3     Years of education: 14     Highest education level: Not on file   Occupational History     Employer: SELF   Social Needs     Financial resource strain: Not on file     Food insecurity:     Worry: Not on file     Inability: Not on file     Transportation needs:     Medical: Not on file     Non-medical: Not on file   Tobacco Use     Smoking status: Never Smoker     Smokeless tobacco: Never Used   Substance and Sexual Activity     Alcohol use: No     Alcohol/week: 10.0 standard drinks     Drug use: No     Sexual activity: Yes     Partners: Male     Birth control/protection: Surgical     Comment:  had vasectomy  Oct. 11, 2002, she had a hysterectomy   Lifestyle     Physical activity:     Days per week: Not on file     Minutes per session: Not on file     Stress: Not on file   Relationships     Social connections:     Talks on phone: Not on file     Gets together:  Not on file     Attends Shinto service: Not on file     Active member of club or organization: Not on file     Attends meetings of clubs or organizations: Not on file     Relationship status: Not on file     Intimate partner violence:     Fear of current or ex partner: Not on file     Emotionally abused: Not on file     Physically abused: Not on file     Forced sexual activity: Not on file   Other Topics Concern      Service No     Blood Transfusions No     Caffeine Concern No     Occupational Exposure No     Hobby Hazards No     Sleep Concern No     Stress Concern Yes     Comment: son with seizures     Weight Concern No     Special Diet No     Back Care No     Exercise Yes     Comment: 6 days a week at the gym     Bike Helmet Yes     Seat Belt Yes     Self-Exams No   Social History Narrative     Not on file       Family History   Problem Relation Age of Onset     Neurologic Disorder Mother         migraines     Lipids Mother      Arthritis Mother         rheumatoid     Chronic Obstructive Pulmonary Disease Mother         smoker     Hypertension Father      Lipids Father      Blood Disease Father 51        DVT  at age 51 from probable PE     Neurologic Disorder Father         Muscular Dystrophy     Muscular Dystrophy Brother 19     Diabetes Paternal Grandmother      Diabetes Paternal Grandfather      Cancer Paternal Grandfather         colon     Neurologic Disorder Brother         Muscular Dystrophy  at age 19 from an MI     Cancer Paternal Uncle         colon     Asthma Son      Congenital Anomalies Son         epilepsy hydrocephilis          ROS: 10 point ROS neg other than the symptoms noted above in the HPI.    Vital Signs: LMP 2010     Examination:  Constitutional:  Alert, well nourished, NAD.  HEENT: Normocephalic, atraumatic.   Pulmonary:  Without shortness of breath, normal effort.   Lymph: no lymphadenopathy to low back or LE.   Integumentary: Skin is free of rashes or lesions.    Cardiovascular:  No pitting edema of BLE.      Neurological:  Awake  Alert  Oriented x 3  Speech clear  Cranial nerves II - XII grossly intact  PERRL  EOMI  Face symmetric  Tongue midline  Motor exam   Hip Flexor:                Right: 5/5  Left:  5/5  Quadriceps:              Right:  5/5  Left:  5/5  Hamstrings:              Right:  5/5  Left:  5/5  Gastroc Soleus:        Right:  5/5  Left:  5/5  Tib/Ant:                      Right:  5/5  Left:  5/5  EHL:                          Right:  5/5  Left:  5/5       Sensation normal to bilateral lower extremities.    Reflexes are 2+ in the patellar and Achilles. There is no clonus. Downgoing Babinski.  Musculoskeletal:  Gait: Able to stand from a seated position. Normal non-antalgic, non-myelopathic gait.  Able to heel/toe walk without loss of balance  Lumbar examination reveals no tenderness of the spine or paraspinous muscles.  Hip height is symmetrical. Negative SI joint, sciatic notch or greater trochanteric tenderness to palpation bilaterally.  Straight leg raise is negative bilaterally.      Imaging:   No recent imaging    Assessment/Plan:   Earlene Mark is a 49 year old female who presents for evaluation of low back pain x 1.5 years. Pain is located in right low back and radiates down posterior right leg into anterior knee. Describes the pain as a constant ache with intermittent sharpness. She does have numbness in right foot. She has tried changing her diet, chiropractic, stretching, psoas belt, massage, and physical therapy at [place] (through [facility]).  Will obtain lumbar MRI and call her with results. Patient voiced understanding and agreement.            Ruby Tamayo PA-C  [facility]  [address] [address]    Tel [phone]  Pager [phone]      Again, thank you for allowing me to participate in the care of your patient.        Sincerely,        Ruby Tamayo,  DANUTA

## 2019-12-16 ENCOUNTER — HOSPITAL ENCOUNTER (OUTPATIENT)
Dept: MRI IMAGING | Facility: CLINIC | Age: 49
Discharge: HOME | End: 2019-12-16
Attending: PHYSICIAN ASSISTANT | Admitting: PHYSICIAN ASSISTANT

## 2019-12-16 DIAGNOSIS — M54.16 LUMBAR RADICULOPATHY: ICD-10-CM

## 2019-12-16 PROCEDURE — 72148 MRI LUMBAR SPINE W/O DYE: CPT

## 2019-12-17 ENCOUNTER — TELEPHONE (OUTPATIENT)
Dept: NEUROSURGERY | Facility: CLINIC | Age: 49
End: 2019-12-17

## 2019-12-17 ENCOUNTER — TELEPHONE (OUTPATIENT)
Dept: PALLIATIVE MEDICINE | Facility: CLINIC | Age: 49
End: 2019-12-17

## 2019-12-17 DIAGNOSIS — M54.16 LUMBAR RADICULOPATHY: Primary | ICD-10-CM

## 2019-12-17 NOTE — TELEPHONE ENCOUNTER
LVM requesting a return call to go over below MRI results and recommendations.    Per Ruby CORBIN..MRI shows mild degenerative changes and some mild stenosis on the right at L4-5. Would recommend right L4-5 TFESI.

## 2019-12-17 NOTE — TELEPHONE ENCOUNTER
Discussed below results with patient. Agreed with injection. Order placed. Advised to call back with questions or concerns.

## 2019-12-17 NOTE — TELEPHONE ENCOUNTER
Called patient to schedule L4-5 Transforaminal JAZZY, will call back at another time        Jazz Whyte    [facility]

## 2019-12-19 NOTE — TELEPHONE ENCOUNTER
Pt asking to speak to nursing    Pre-screening Questions for Radiology Injections:    Injection to be done at which interventional clinic site? [facility] [place]    Instruct patient to arrive as directed prior to the scheduled appointment time:    [place] minutes before      [place]: 30 minutes before; if IV needed 1 hour before     Procedure ordered by Ruby Tamayo PA-C    Procedure ordered? L4-5 Transforaminal JAZZY       Transforaminal Cervical JAZZY - Dr. Marcia Mackey ONLY    What insurance would patient like us to bill for this procedure? Self Pay, informed patient of payment due up front      Worker's comp or MVA (motor vehicle accident) -Any injection DO NOT SCHEDULE and route to [place] [place].      [employer] insurance - For SI joint injections, DO NOT SCHEDULE and route [place] Nance.       [employer] - Any injection besides hip/shoulder/knee joint DO NOT SCHEDULE and route to [place]ley Goyo. She will obtain PA and call pt back to schedule procedure or notify pt of denial.       [employer]-Route to [place] for review      **BCBS- ALL need to be routed to [place] for review if a PA is needed**      IF SCHEDULING IN [place] AND NEEDS A PA, IT IS OKAY TO SCHEDULE. [place] HANDLES THEIR OWN PA'S AFTER THE PATIENT IS SCHEDULED. PLEASE SCHEDULE AT LEAST 1 WEEK OUT SO A PA CAN BE OBTAINED.    Any chance of pregnancy? NO   If YES, do NOT schedule and route to RN pool    Is an  needed? No     Patient has a drive home? (mandatory) YES: informed     Is patient taking any blood thinners (i.e. plavix, coumadin, jantoven, warfarin, heparin, pradaxa or dabigatran, etc)? No   If hold needed, do NOT schedule, route to RN pool     Is patient taking any aspirin products (includes Excedrin and Fiorinal)? No     If more than 325mg/day do NOT schedule; route to RN pool     For CERVICAL procedures, hold all aspirin products for 6 days.     Tell pt that if aspirin product is not held for 6 days, the  procedure WILL BE cancelled.      Does the patient have a bleeding or clotting disorder? No     If YES, okay to schedule AND route to RN nurse pool    For any patients with platelet count <100, must be forwarded to provider    Is patient diabetic?  No  If YES, instruct them to bring their glucometer.    Does patient have an active infection or treated for one within the past week? No     Is patient currently taking any antibiotics?  No     For patients on chronic, preventative, or prophylactic antibiotics, procedures may be scheduled.     For patients on antibiotics for active or recent infection:antibiotic course must have been completed for 4 days    Is patient currently taking any steroid medications? (i.e. Prednisone, Medrol)  No     For patients on steroid medications, course must have been completed for 4 days    Reviewed with patient:  If you are started on any steroids or antibiotics between now and your appointment, you must contact us because the procedure may need to be cancelled.  Yes    Is patient actively being treated for cancer or immunocompromised? No  If YES, do NOT schedule and route to RN pool     Are you able to get on and off an exam table with minimal or no assistance? Yes  If NO, do NOT schedule and route to RN pool    Are you able to roll over and lay on your stomach with minimal or no assistance? Yes  If NO, do NOT schedule and route to RN pool     Any allergies to contrast dye, iodine, shellfish, or numbing and steroid medications? No  If YES, route to RN pool AND add allergy information to appointment notes    Allergies: Morphine; Prochlorperazine; and Seasonal allergies      Has the patient had a flu shot or any other vaccinations within 7 days before or after the procedure.  No     Does patient have an MRI/CT?  YES: 2019  Check Procedure Scheduling Grid to see if required.      Was the MRI done within the last 3 years?  Yes    If yes, where [facility], [facility],  [place], [facility] etc?       If no, do not schedule and route to RN pool    If MRI was not done at [place], [facility] or [facility] do NOT schedule and route to RN pool.      If pt has an imaging disc, the injection MAY be scheduled but pt has to bring disc to appt.     If they show up without the disc the injection cannot be done    Reminders (please tell patient if applicable):       Instructed pt to arrive 30 minutes early for IV start if required. (Check Procedure Scheduling Grid)  Not Applicable      If celiac plexus block, informed patient NPO for 6 hours and that it is okay to take medications with sips of water, especially blood pressure medications  Not Applicable         If this is for a cervical procedure, informed patient that aspirin needs to be held for 6 days.   Not Applicable      For all patients not having spinal cord stimulator (SCS) trials or radiofrequency ablations (RFAs), informed patient:    IV sedation is not provided for this procedure.  If you feel that an oral anti-anxiety medication is needed, you can discuss this further with your referring provider or primary care provider.  The Pain Clinic provider will discuss specifics of what the procedure includes at your appointment.  Most procedures last 10-20 minutes.  We use numbing medications to help with any discomfort during the procedure.  Not Applicable      Do not schedule procedures requiring IV placement in the first appointment of the day or first appointment after lunch. Do NOT schedule at 0745, 0815 or 1245.       For patients 85 or older we recommend having an adult stay w/ them for the remainder of the day.       Does the patient have any questions?  YES  Jazz Whyte  [facility]

## 2019-12-27 NOTE — TELEPHONE ENCOUNTER
Writer called pt back.  Pt stated that she wasn't sure what the procedure is that was ordered.  Writer educated Pt on the procedure and expectations after the procedure.    Pt verbalized understanding and had no further questions.    Herbert Miranda RN  Care Coordinator   [facility]

## 2020-01-06 NOTE — PROGRESS NOTES
[facility] - Procedure Note    Date of Service: 1/8/2020    Procedure performed: Right L4-5 transforaminal epidural steroid injection with fluoroscopic guidance  Diagnosis: Lumbar spondylosis; Lumbar radiculitis/radiculopathy  : Valerie Beckham MD   Anesthesia: none    Indications: Earlene Mark is a 49 year old female who is seen at the request of Ruby Tamayo PA-C for lumbar transforaminal epidural steroid injection. The patient describes right sided low back pain radiating down the posterior right leg to the anterior knee for the last 1.5 years. The patient has been exhibiting symptoms consistent with lumbar intraspinal inflammation and radiculopathy. Symptoms have been persistent, disabling, and intermittently severe. The patient reports minimal improvement with conservative treatment, including chiropractic care, massage and PT.    Lumbar MRI was done on 12/16/2019 which showed:   FINDINGS:  Alignment is maintained. Bone marrow demonstrates edema  surrounding the L5-S1 disc joint. T1 hyperintense lesion within the L4  vertebral body is consistent with benign intraosseous cavernous venous  malformation. Multilevel mild disc height loss is present. Moderate  disc height loss is present at L5-S1. No high-grade facet arthropathy.  Conus medullaris and cauda equina are unremarkable. T2 hyperintense  renal lesions bilaterally are incompletely characterized,  statistically likely benign cysts.     Segmental Analysis:      T12-L1:  No foraminal or spinal canal stenosis.      L1-L2:  No foraminal or spinal canal stenosis.       L2-L3:  Small right central disc protrusion/annular fissure. No  foraminal stenosis. Mild spinal canal stenosis.       L3-L4:  Disc bulge with probable early antral annular fissuring. No  foraminal or spinal canal stenosis.      L4-L5:  Disc bulge with central annular fissuring. Mild bilateral  foraminal stenosis. Mild spinal canal stenosis.       L5-S1:  Disc  bulge. Mild right foraminal stenosis. Moderate left  foraminal stenosis. No spinal canal stenosis.                                                                    IMPRESSION:    1. Degenerative disc disease as detailed.  2. Moderate foraminal stenosis on the left at L5-S1.      Allergies:      Allergies   Allergen Reactions     Morphine      Prochlorperazine      compazine -dystonic reaction     Seasonal Allergies         Vitals:  /60 (BP Location: Left arm, Cuff Size: Adult Regular)   Pulse 54   LMP 02/01/2010   SpO2 99%     Review of Systems: The patient denies recent fever, chills, illness, use of antibiotics or anticoagulants. All other 10-point review of systems negative.     Procedure: The procedure and risks were explained, and informed written consent was obtained from the patient. Risks include but are not limited to: infection, bleeding, increased pain, and damage to soft tissue, nerve, muscle, and vasculature structures. After getting informed consent, patient was brought into the procedure suite and was placed in a prone position on the procedure table. A Pause for the Cause was performed. Patient was prepped and draped in sterile fashion.     After identifying the right L4-5 neuroforamen, the C-arm was rotated to a right lateral oblique angle.  A total of 4.5 mL of Lidocaine 1% was used to anesthetize the skin and the needle track at a skin entry site coaxial with the fluoroscopy beam, and overriding the superior aspect of the neuroforamen.  A 22 gauge 5 inch spinal needle was advanced under intermittent fluoroscopy until it entered the foramen superiorly.    The position was then inspected from anteroposterior and lateral views, and the needle adjusted appropriately.  After negative aspiration, a total of 1 mL of Omnipaque-300 was injected using static and continuous fluoroscopy confirming appropriate position, with spread along the nerve root sheath and into the epidural space, with no  intravascular or intrathecal uptake. 9 mL of Omnipaque-300 was wasted.    2mL of 1% lidocaine with 20 mg of dexamethasone was injected.  The needle was removed. Hemostasis was achieved, the area was cleaned, and bandaids were placed when appropriate. Images were saved to PACS.    The patient tolerated the procedure well, and was taken to the recovery room, and there was no evidence of procedural complications. No new sensory or motor deficits were noted following the procedure. The patient was stable and able to ambulate on discharge home. Post-procedure instructions were provided.     Pre-procedure pain score: 7/10 in the back, 7/10 in the leg  Post-procedure pain score: 5/10 in the back, 5/10 in the leg    Assessment/Plan: Earlene Mark is a 49 year old female s/p Right L4-5 transforaminal epidural steroid injection today for lumbar spondylosis, radiculitis/radiculopathy.     1. Following today's procedure, the patient was advised to contact the Pain Management Center for any of the following:   Fever, chills, or night sweats   New onset of pain, numbness, or weakness   Any questions/concerns regarding the procedure  If unable to contact the Pain Center, the patient was instructed to go to a local Emergency Room for any complications.   2. The patient will receive a follow-up call in 1 week.  3. The patient should follow-up with the referring provider in 2 weeks for post-procedure evaluation.      MATHIEU HAYS MD   Pain Management & Addiction Medicine

## 2020-01-08 ENCOUNTER — ANCILLARY PROCEDURE (OUTPATIENT)
Dept: GENERAL RADIOLOGY | Facility: CLINIC | Age: 50
End: 2020-01-08
Attending: ANESTHESIOLOGY

## 2020-01-08 ENCOUNTER — RADIOLOGY INJECTION OFFICE VISIT (OUTPATIENT)
Dept: PALLIATIVE MEDICINE | Facility: CLINIC | Age: 50
End: 2020-01-08

## 2020-01-08 VITALS — SYSTOLIC BLOOD PRESSURE: 112 MMHG | OXYGEN SATURATION: 99 % | HEART RATE: 54 BPM | DIASTOLIC BLOOD PRESSURE: 60 MMHG

## 2020-01-08 DIAGNOSIS — M54.16 LUMBAR RADICULOPATHY: Primary | ICD-10-CM

## 2020-01-08 DIAGNOSIS — M54.16 LUMBAR RADICULOPATHY: ICD-10-CM

## 2020-01-08 PROCEDURE — 64483 NJX AA&/STRD TFRM EPI L/S 1: CPT | Mod: RT | Performed by: ANESTHESIOLOGY

## 2020-01-08 NOTE — NURSING NOTE
Discharge Information    IV Discontiued Time:  NA    Amount of Fluid Infused:  NA    Discharge Criteria = When patient returns to baseline or as per MD order    Consciousness:  Pt is fully awake    Circulation:  BP +/- 20% of pre-procedure level    Respiration:  Patient is able to breathe deeply    O2 Sat:  Patient is able to maintain O2 Sat >92% on room air    Activity:  Moves 4 extremities on command    Ambulation:  Patient is able to stand and walk or stand and pivot into wheelchair    Dressing:  Clean/dry or No Dressing    Notes:   Discharge instructions and AVS given to patient    Patient meets criteria for discharge?  YES    Admitted to PCU?  No    Responsible adult present to accompany patient home?  Yes    Signature/Title:    Sharon Rhodes RN Care Coordinator  [facility] [place]

## 2020-01-08 NOTE — PATIENT INSTRUCTIONS
[facility] Procedure Discharge Instructions    Today you saw:   Dr. Valerie Beckham      Your procedure:  Lumbar Epidural steroid injection     Medications used:  Lidocaine (anesthetic)  Dexamethasone (steroid) Omnipaque (contrast)                  Be cautious when walking as numbness and/or weakness in the legs may occur up to 6-8 hours after the procedure due to effect of the local anesthetic    Do not drive for 6 hours. The effect of the local anesthetic could slow your reflexes.     Avoid strenuous activity for the first 24 hours. You may resume your regular activities after that.     You may shower, however avoid swimming, tub baths or hot tubs for 24 hours following your procedure    You may have a mild to moderate increase in pain for several days following the injection.      You may use ice packs for 10-15 minutes, 3 to 4 times a day at the injection site for comfort    Do not use heat to painful areas for 6 to 8 hours. This will give the local anesthetic time to wear off and prevent you from accidentally burning your skin.    Unless you have been directed to avoid the use of anti-inflammatory medications (NSAIDS-ibuprofen, Aleve, Motrin), you may use these medications or Tylenol for pain control if needed.     With diabetes, check your blood sugar more frequently than usual as your blood sugar may be higher than normal for 10-14 days following a steroid injection. Contact your doctor who manages your diabetes if your blood sugar is higher than usual    Possible side effects of steroids that you may experience include flushing, elevated blood pressure, increased appetite, mild headaches and restlessness.  All of these symptoms will get better with time.    It may take up to 14 days for the steroid medication to start working although you may feel the effect as early as a few days after the procedure.     Follow up with your referring provider in 2-3 weeks      If you experience any of the  following, call the pain center line during work hours at [phone] or on-call physician after hours at [phone]:  -Fever over 100 degree F  -Swelling, bleeding, redness, drainage, warmth at the injection site  -Progressive weakness or numbness in your legs   -Loss of bowel or bladder function  -Unusual headache that is not relieved by Tylenol or your regular headache medication  -Unusual new onset of pain that is not improving

## 2020-02-03 ENCOUNTER — TELEPHONE (OUTPATIENT)
Dept: NEUROSURGERY | Facility: CLINIC | Age: 50
End: 2020-02-03

## 2020-02-03 ENCOUNTER — NURSE TRIAGE (OUTPATIENT)
Dept: NURSING | Facility: CLINIC | Age: 50
End: 2020-02-03

## 2020-02-03 DIAGNOSIS — M54.16 LUMBAR RADICULOPATHY: Primary | ICD-10-CM

## 2020-02-03 NOTE — TELEPHONE ENCOUNTER
Patient requests scheduling for another steroid injection.  Call transferred to [facility].      Reason for Disposition    Requesting regular office appointment    Additional Information    Negative: RN needs further essential information from caller in order to complete triage    Negative: [1] Caller is not with the adult (patient) AND [2] reporting urgent symptoms    Negative: Lab result questions    Negative: Medication questions    Negative: Caller can't be reached by phone    Negative: Caller has already spoken to PCP or another triager    Protocols used: INFORMATION ONLY CALL-A-

## 2020-02-03 NOTE — TELEPHONE ENCOUNTER
Patient called clinic requesting to repeat injection.    Type of injection: LORENZO    Most recent injection date: 1/8/20    How long did injection provide symptomatic relief: 40-60% for approximately 1 month. She continues to have about 20% relief, but pain management recommended another injection for more relief     Current symptoms: Right leg pain     Number of injections in last 12 months: 1    Plan: Will order another injection per the recommendations of pain management. Patient is having the same symptoms as she did at last exam.      Patient voiced understanding and agreement with plan.     Advised patient to call back with any questions or concerns.

## 2020-02-04 ENCOUNTER — TELEPHONE (OUTPATIENT)
Dept: PALLIATIVE MEDICINE | Facility: CLINIC | Age: 50
End: 2020-02-04

## 2020-02-04 NOTE — TELEPHONE ENCOUNTER
Transesophageal echocardiogram note:    INDICATION:   cardiac source of embolus    DESCRIPTION OF PROCEDURE:      Informed consent was obtained from the patient.  The  risks of esophageal injury, aspiration, and adverse reaction to medication were  explained.  The patient voiced understanding and agreed to proceed.     The patient received, pharyngeal anesthesia with lidocaine swallow and cetecaine spray and  received moderate conscious sedation per protocol, receiving in total  versed 2 mg  fentanyl  50 mcg    Heart rate, blood pressure, and saturations were stable throughout. CO2 monitoring was performed.     Once the patient was adequately sedated, the transesophageal probe was introduced into the patient's mouth and esophagus intubated without difficulty.  Transgastric and transesophageal views were obtained.  Continuous wave, color flow, pulse wave Doppler were performed.  Agitated saline was administered.  There were no immediate complications.    FINDINGS:  1. LV function:normal LV  2. Mitral valve:normal  3. Aortic valve:normal  4. Tricuspid valve:normal  5. Left atrial appendage is well visualized with velocities in the region of  60 cm/second.  6. Left atrium, no thrombus.  7. No evidence of ASD or PFO by color flow, and agitated saline contrast.  8. Pulmonic veins identified draining into the left atrium  9. Descending thoracic aorta is within normal limits.    IMPRESSION:    Normal LVSF  No cardiac source of embolus is seen           Electronically Signed On 03/27/2018 08:03  __________________________________________________   RIC ALONZO     "Pre-screening Questions for Radiology Injections:    Injection to be done at which interventional clinic site? [facility]    Instruct patient to arrive as directed prior to the scheduled appointment time:    [place] minutes before      [place]: 30 minutes before; if IV needed 1 hour before     Dr. Lim-no IV needed for Cervical JAZZY; please instruct to arrive 30\" early    Procedure ordered by Ruby Tamayo    Procedure ordered? LORENZO pt requests Dr. Beckham      Transforaminal Cervical JAZZY - no pain provider currently performing    What insurance would patient like us to bill for this procedure? Self Pay      Worker's comp or MVA (motor vehicle accident) -Any injection DO NOT SCHEDULE and route to [place].      [employer] insurance - For SI joint injections, DO NOT SCHEDULE and route [place].       [employer] - Any injection besides hip/shoulder/knee joint DO NOT SCHEDULE and route to [place]. She will obtain PA and call pt back to schedule procedure or notify pt of denial.       [employer]-Route to [place] for review      **[employer]- ALL need to be routed to [place] for review if a PA is needed**      IF SCHEDULING IN [place] AND NEEDS A PA, IT IS OKAY TO SCHEDULE. [place] HANDLES THEIR OWN PA'S AFTER THE PATIENT IS SCHEDULED. PLEASE SCHEDULE AT LEAST 1 WEEK OUT SO A PA CAN BE OBTAINED.    Any chance of pregnancy? NO   If YES, do NOT schedule and route to RN pool    Is an  needed? No     Patient has a drive home? (mandatory) YES: Informed    Is patient taking any blood thinners (i.e. plavix, coumadin, jantoven, warfarin, heparin, pradaxa or dabigatran, etc)? No   If hold needed, do NOT schedule, route to RN pool     Is patient taking any aspirin products (includes Excedrin and Fiorinal)? No     If more than 325mg/day do NOT schedule; route to RN pool     For CERVICAL procedures, hold all aspirin products for 6 days.     Tell pt that if aspirin product is not held for 6 days, the " procedure WILL BE cancelled.      Does the patient have a bleeding or clotting disorder? No     If YES, okay to schedule AND route to RN nurse pool    For any patients with platelet count <100, must be forwarded to provider    Is patient diabetic?  No  If YES, instruct them to bring their glucometer.    Does patient have an active infection or treated for one within the past week? No     Is patient currently taking any antibiotics?  No     For patients on chronic, preventative, or prophylactic antibiotics, procedures may be scheduled.     For patients on antibiotics for active or recent infection:antibiotic course must have been completed for 4 days    Is patient currently taking any steroid medications? (i.e. Prednisone, Medrol)  No     For patients on steroid medications, course must have been completed for 4 days    Reviewed with patient:  If you are started on any steroids or antibiotics between now and your appointment, you must contact us because the procedure may need to be cancelled.  Yes    Is patient actively being treated for cancer or immunocompromised? No  If YES, do NOT schedule and route to RN pool     Are you able to get on and off an exam table with minimal or no assistance? Yes  If NO, do NOT schedule and route to RN pool    Are you able to roll over and lay on your stomach with minimal or no assistance? Yes  If NO, do NOT schedule and route to RN pool     Any allergies to contrast dye, iodine, shellfish, or numbing and steroid medications? No  If YES, route to RN pool AND add allergy information to appointment notes    Allergies: Morphine; Prochlorperazine; and Seasonal allergies      Has the patient had a flu shot or any other vaccinations within 7 days before or after the procedure.  No     Does patient have an MRI/CT?  YES: 2019  Check Procedure Scheduling Grid to see if required.      Was the MRI done within the last 3 years?  Yes    If yes, where was the MRI done [facility], [facility],  [facility], [facility] etc? [facility]      If no, do not schedule and route to RN pool    If MRI was not done at [place], [facility] or [facility] do NOT schedule and route to RN pool.      If pt has an imaging disc, the injection MAY be scheduled but pt has to bring disc to appt.     If they show up without the disc the injection cannot be done    Reminders (please tell patient if applicable):       Instructed pt to arrive 30 minutes early for IV start if required. (Check Procedure Scheduling Grid)  Not Applicable      If celiac plexus block, informed patient NPO for 6 hours and that it is okay to take medications with sips of water, especially blood pressure medications  Not Applicable         If this is for a cervical procedure, informed patient that aspirin needs to be held for 6 days.   Not Applicable      For all patients not having spinal cord stimulator (SCS) trials or radiofrequency ablations (RFAs), informed patient:    IV sedation is not provided for this procedure.  If you feel that an oral anti-anxiety medication is needed, you can discuss this further with your referring provider or primary care provider.  The Pain Clinic provider will discuss specifics of what the procedure includes at your appointment.  Most procedures last 10-20 minutes.  We use numbing medications to help with any discomfort during the procedure.  Not Applicable      Do not schedule procedures requiring IV placement in the first appointment of the day or first appointment after lunch. Do NOT schedule at 0745, 0815 or 1245.       For patients 85 or older we recommend having an adult stay w/ them for the remainder of the day.       Does the patient have any questions?  NO  Heather Ramirez  [facility]

## 2020-02-05 NOTE — PROGRESS NOTES
[facility] - Procedure Note    Date of Service: 2/7/2020    Procedure performed: Right L4-5 transforaminal epidural steroid injection with fluoroscopic guidance  Diagnosis: Lumbar spondylosis; Lumbar radiculitis/radiculopathy  : Valerie Beckham MD   Anesthesia: none    Indications: Earlene Mark is a 49 year old female who is seen at the request of Ruby Tamayo PA-C for lumbar transforaminal epidural steroid injection. The patient describes right sided low back pain radiating down the posterior right leg to the anterior knee. The patient has been exhibiting symptoms consistent with lumbar intraspinal inflammation and radiculopathy. Symptoms have been persistent, disabling, and intermittently severe. The patient reports minimal improvement with conservative treatment, including chiropractic care, massage and PT.    This is a repeat injection.  Previous Right L4-5 transforaminal epidural steroid injection done by myself on 1/8/2020 provided good pain relief for a period of 3 weeks.       Lumbar MRI was done on 12/16/2019 which showed:   FINDINGS:  Alignment is maintained. Bone marrow demonstrates edema  surrounding the L5-S1 disc joint. T1 hyperintense lesion within the L4  vertebral body is consistent with benign intraosseous cavernous venous  malformation. Multilevel mild disc height loss is present. Moderate  disc height loss is present at L5-S1. No high-grade facet arthropathy.  Conus medullaris and cauda equina are unremarkable. T2 hyperintense  renal lesions bilaterally are incompletely characterized,  statistically likely benign cysts.     Segmental Analysis:      T12-L1:  No foraminal or spinal canal stenosis.      L1-L2:  No foraminal or spinal canal stenosis.       L2-L3:  Small right central disc protrusion/annular fissure. No  foraminal stenosis. Mild spinal canal stenosis.       L3-L4:  Disc bulge with probable early antral annular fissuring. No  foraminal or spinal  canal stenosis.      L4-L5:  Disc bulge with central annular fissuring. Mild bilateral  foraminal stenosis. Mild spinal canal stenosis.       L5-S1:  Disc bulge. Mild right foraminal stenosis. Moderate left  foraminal stenosis. No spinal canal stenosis.                                                                    IMPRESSION:    1. Degenerative disc disease as detailed.  2. Moderate foraminal stenosis on the left at L5-S1.      Allergies:      Allergies   Allergen Reactions     Morphine      Prochlorperazine      compazine -dystonic reaction     Seasonal Allergies         Vitals:  /68   Pulse 95   LMP 02/01/2010   SpO2 97%     Review of Systems: The patient denies recent fever, chills, illness, use of antibiotics or anticoagulants. All other 10-point review of systems negative.     Procedure: The procedure and risks were explained, and informed written consent was obtained from the patient. Risks include but are not limited to: infection, bleeding, increased pain, and damage to soft tissue, nerve, muscle, and vasculature structures. After getting informed consent, patient was brought into the procedure suite and was placed in a prone position on the procedure table. A Pause for the Cause was performed. Patient was prepped and draped in sterile fashion.     After identifying the right L4-5 neuroforamen, the C-arm was rotated to a right lateral oblique angle.  A total of 4.5 mL of Lidocaine 1% was used to anesthetize the skin and the needle track at a skin entry site coaxial with the fluoroscopy beam, and overriding the superior aspect of the neuroforamen.  A 22 gauge 5 inch spinal needle was advanced under intermittent fluoroscopy until it entered the foramen superiorly.    The position was then inspected from anteroposterior and lateral views, and the needle adjusted appropriately.  After negative aspiration, a total of 1 mL of Omnipaque-300 was injected using static and continuous fluoroscopy  confirming appropriate position, with spread along the nerve root sheath and into the epidural space, with no intravascular or intrathecal uptake. 9 mL of Omnipaque-300 was wasted.    2mL of 1% lidocaine with 20 mg of dexamethasone was injected.  The needle was removed. Hemostasis was achieved, the area was cleaned, and bandaids were placed when appropriate. Images were saved to PACS.    The patient tolerated the procedure well, and was taken to the recovery room, and there was no evidence of procedural complications. No new sensory or motor deficits were noted following the procedure. The patient was stable and able to ambulate on discharge home. Post-procedure instructions were provided.     Pre-procedure pain score: 6/10 in the back, 7/10 in the leg  Post-procedure pain score: 3/10 in the back, 3/10 in the leg    Assessment/Plan: Earlene Mark is a 49 year old female s/p Right L4-5 transforaminal epidural steroid injection today for lumbar spondylosis, radiculitis/radiculopathy.     1. Following today's procedure, the patient was advised to contact the Pain Management Center for any of the following:   Fever, chills, or night sweats   New onset of pain, numbness, or weakness   Any questions/concerns regarding the procedure  If unable to contact the Pain Center, the patient was instructed to go to a local Emergency Room for any complications.   2. The patient will receive a follow-up call in 1 week.  3. The patient should follow-up with the referring provider in 2 weeks for post-procedure evaluation.      MATHIEU HAYS MD   Pain Management & Addiction Medicine

## 2020-02-07 ENCOUNTER — ANCILLARY PROCEDURE (OUTPATIENT)
Dept: GENERAL RADIOLOGY | Facility: CLINIC | Age: 50
End: 2020-02-07
Attending: ANESTHESIOLOGY

## 2020-02-07 ENCOUNTER — RADIOLOGY INJECTION OFFICE VISIT (OUTPATIENT)
Dept: PALLIATIVE MEDICINE | Facility: CLINIC | Age: 50
End: 2020-02-07

## 2020-02-07 VITALS — DIASTOLIC BLOOD PRESSURE: 68 MMHG | OXYGEN SATURATION: 97 % | SYSTOLIC BLOOD PRESSURE: 110 MMHG | HEART RATE: 95 BPM

## 2020-02-07 DIAGNOSIS — M54.16 LUMBAR RADICULOPATHY: ICD-10-CM

## 2020-02-07 DIAGNOSIS — M54.16 LUMBAR RADICULOPATHY: Primary | ICD-10-CM

## 2020-02-07 PROCEDURE — 64483 NJX AA&/STRD TFRM EPI L/S 1: CPT | Mod: RT | Performed by: ANESTHESIOLOGY

## 2020-02-07 NOTE — NURSING NOTE
Discharge Information    IV Discontiued Time:  NA    Amount of Fluid Infused:  NA    Discharge Criteria = When patient returns to baseline or as per MD order    Consciousness:  Pt is fully awake    Circulation:  BP +/- 20% of pre-procedure level    Respiration:  Patient is able to breathe deeply    O2 Sat:  Patient is able to maintain O2 Sat >92% on room air    Activity:  Moves 4 extremities on command    Ambulation:  Patient is able to stand and walk or stand and pivot into wheelchair    Dressing:  Clean/dry or No Dressing    Notes:   Discharge instructions and AVS given to patient    Patient meets criteria for discharge?  YES    Admitted to PCU?  No    Responsible adult present to accompany patient home?  Yes    Signature/Title:    Sharon Rhodes RN Care Coordinator  [facility] [place]

## 2020-02-07 NOTE — NURSING NOTE
Pre-procedure Intake    Have you been fasting? No    If yes, for how long?     Are you taking a prescribed blood thinner such as coumadin, Plavix, Xarelto?    No    If yes, when did you take your last dose?     Do you take aspirin?  No    If cervical procedure, have you held aspirin for 6 days?   NA    Do you have any allergies to contrast dye, iodine, steroid and/or numbing medications?  NO    Are you currently taking antibiotics or have an active infection?  NO    Have you had a fever/elevated temperature within the past week? NO    Are you currently taking oral steroids? NO    Do you have a ? Yes       Are you pregnant or breastfeeding?  NO    Are the vital signs normal?  Yes

## 2020-02-07 NOTE — PATIENT INSTRUCTIONS
[facility] Procedure Discharge Instructions    Today you saw:   Dr. Valerie Beckham      Your procedure:  Epidural steroid injection      Medications used:  Lidocaine (anesthetic)  Dexamethasone (steroid)       Omnipaque (contrast)                 Be cautious when walking as numbness and/or weakness in the legs may occur up to 6-8 hours after the procedure due to effect of the local anesthetic    Do not drive for 6 hours. The effect of the local anesthetic could slow your reflexes.     Avoid strenuous activity for the first 24 hours. You may resume your regular activities after that.     You may shower, however avoid swimming, tub baths or hot tubs for 24 hours following your procedure    You may have a mild to moderate increase in pain for several days following the injection.      You may use ice packs for 10-15 minutes, 3 to 4 times a day at the injection site for comfort    Do not use heat to painful areas for 6 to 8 hours. This will give the local anesthetic time to wear off and prevent you from accidentally burning your skin.    Unless you have been directed to avoid the use of anti-inflammatory medications (NSAIDS-ibuprofen, Aleve, Motrin), you may use these medications or Tylenol for pain control if needed.     With diabetes, check your blood sugar more frequently than usual as your blood sugar may be higher than normal for 10-14 days following a steroid injection. Contact your doctor who manages your diabetes if your blood sugar is higher than usual    Possible side effects of steroids that you may experience include flushing, elevated blood pressure, increased appetite, mild headaches and restlessness.  All of these symptoms will get better with time.    It may take up to 14 days for the steroid medication to start working although you may feel the effect as early as a few days after the procedure.     Follow up with your referring provider in 2-3 weeks      If you experience any of the  following, call the pain center line during work hours at [phone] or on-call physician after hours at [phone]:  -Fever over 100 degree F  -Swelling, bleeding, redness, drainage, warmth at the injection site  -Progressive weakness or numbness in your legs   -Loss of bowel or bladder function  -Unusual headache that is not relieved by Tylenol or your regular headache medication  -Unusual new onset of pain that is not improving

## 2020-02-07 NOTE — NURSING NOTE
Discharge Information    IV Discontiued Time:  NA    Amount of Fluid Infused:  NA    Discharge Criteria = When patient returns to baseline or as per MD order    Consciousness:  Pt is fully awake    Circulation:  BP +/- 20% of pre-procedure level    Respiration:  Patient is able to breathe deeply    O2 Sat:  Patient is able to maintain O2 Sat >92% on room air    Activity:  Moves 4 extremities on command    Ambulation:  Patient is able to stand and walk or stand and pivot into wheelchair    Dressing:  Clean/dry or No Dressing    Notes:   Discharge instructions and AVS given to patient    Patient meets criteria for discharge?  YES    Admitted to PCU?  No    Responsible adult present to accompany patient home?  Yes    Signature/Title:    Sharon Georges RN  RN Care Coordinator  [facility] [place]

## 2020-03-20 ENCOUNTER — TELEPHONE (OUTPATIENT)
Dept: NEUROSURGERY | Facility: CLINIC | Age: 50
End: 2020-03-20

## 2020-03-20 DIAGNOSIS — M54.16 LUMBAR RADICULOPATHY: Primary | ICD-10-CM

## 2020-03-20 NOTE — TELEPHONE ENCOUNTER
Patient called clinic requesting to repeat injection.    Type of injection: LORENZO    Most recent injection date: 2/7/2020    How long did injection provide symptomatic relief: 2-3 weeks    Current symptoms: Low back and right leg pain. No change in symptoms.     Number of injections in last 12 months: 2    Plan: Will place order for another injection. Informed patient that injections are being postponed for now due to COVID-19 but she still would like referral sent over to  Pain Management so she can discuss possible scheduling with them. Informed patient we can also try [facility] or [facility]. She will call back if she'd like to proceed with either option.    Patient voiced understanding and agreement with plan.

## 2020-04-03 DIAGNOSIS — G47.09 OTHER INSOMNIA: ICD-10-CM

## 2020-04-06 RX ORDER — TRAZODONE HYDROCHLORIDE 50 MG/1
TABLET, FILM COATED ORAL
Qty: 135 TABLET | Refills: 1 | Status: SHIPPED | OUTPATIENT
Start: 2020-04-06 | End: 2020-06-08

## 2020-04-06 NOTE — TELEPHONE ENCOUNTER
Routing refill request to provider for review/approval because:  Patient needs to be seen because it has been more than 1 year since last office visit.  (LOV 4/20/2018)  Amelia Storey RN

## 2020-04-06 NOTE — TELEPHONE ENCOUNTER
Will need a visit sometime this year.  Rx approved.    Electronically signed by:  Kushal Linda M.D.  4/6/2020

## 2020-04-06 NOTE — TELEPHONE ENCOUNTER
"trazodone  Last Written Prescription Date:  10/14/2019  Last Fill Quantity: 135,  # refills: 1   Last office visit: 11/16/2018 with prescribing provider:      Future Office Visit:      Requested Prescriptions   Pending Prescriptions Disp Refills     traZODone (DESYREL) 50 MG tablet 135 tablet 1     Sig: TAKE ONE AND ONE-HALF TABLETS BY MOUTH AT BEDTIME       Serotonin Modulators Failed - 4/3/2020  4:33 PM        Failed - Recent (12 mo) or future (30 days) visit within the authorizing provider's specialty     Patient has had an office visit with the authorizing provider or a provider within the authorizing providers department within the previous 12 mos or has a future within next 30 days. See \"Patient Info\" tab in inbasket, or \"Choose Columns\" in Meds & Orders section of the refill encounter.              Passed - Medication is active on med list        Passed - Patient is age 18 or older        Passed - No active pregnancy on record        Passed - No positive pregnancy test in past 12 months                 Amelia Storey RN on 4/6/2020 at 11:27 AM    "

## 2020-06-08 ENCOUNTER — VIRTUAL VISIT (OUTPATIENT)
Dept: FAMILY MEDICINE | Facility: CLINIC | Age: 50
End: 2020-06-08

## 2020-06-08 DIAGNOSIS — G47.09 OTHER INSOMNIA: ICD-10-CM

## 2020-06-08 PROCEDURE — 99213 OFFICE O/P EST LOW 20 MIN: CPT | Mod: 95 | Performed by: FAMILY MEDICINE

## 2020-06-08 RX ORDER — TRAZODONE HYDROCHLORIDE 50 MG/1
TABLET, FILM COATED ORAL
Qty: 135 TABLET | Refills: 3 | Status: SHIPPED | OUTPATIENT
Start: 2020-06-08 | End: 2020-08-12

## 2020-06-08 NOTE — PROGRESS NOTES
"Earlene Mark is a 49 year old female who is being evaluated via a billable telephone visit.      The patient has been notified of following:     \"This telephone visit will be conducted via a call between you and your physician/provider. We have found that certain health care needs can be provided without the need for a physical exam.  This service lets us provide the care you need with a short phone conversation.  If a prescription is necessary we can send it directly to your pharmacy.  If lab work is needed we can place an order for that and you can then stop by our lab to have the test done at a later time.    Telephone visits are billed at different rates depending on your insurance coverage. During this emergency period, for some insurers they may be billed the same as an in-person visit.  Please reach out to your insurance provider with any questions.    If during the course of the call the physician/provider feels a telephone visit is not appropriate, you will not be charged for this service.\"    Patient has given verbal consent for Telephone visit?  Yes    What phone number would you like to be contacted at? [phone]    How would you like to obtain your AVS? Leslyhart    Subjective     Earlene Mark is a 49 year old female who presents via phone visit today for the following health issues:    HPI  Chief Complaint   Patient presents with     Recheck Medication     Trazadone     PROBLEMS TO ADD ON...  Using her trazadone for sleep as prescribed.  No new issues.  She never overuses her trazodone, she takes anywhere between 1/2 tablet to 2 tablets nightly for sleep.  She never runs out early.  She is going through some menopausal changes with hot flashes and energy levels but was working with a doctor down in the [place] and that seems to be better.  Is no other health concerns.  Her weight is good she is working out 5 to 6 days a week and her back pain is much better.  We did discuss health maintenance " issues see our discussion later on at the end of this note.    Patient Active Problem List   Diagnosis     FILEMON 3 - cervical intraepithelial neoplasia grade 3     Menorrhagia     CARDIOVASCULAR SCREENING; LDL GOAL LESS THAN 160     s/p Hysterectomy, cervical pathology benign, no need for further pap smears     Other insomnia     Migraine without aura and without status migrainosus, not intractable     Past Surgical History:   Procedure Laterality Date     C  DELIVERY ONLY      , Low Cervical     COLPOSCOPY,LOOP ELECTRD CERVIX EXCIS       HC CONIZATION CERVIX,KNIFE/LASER  8    FILEMON 3     HC REMOVAL OF TONSILS,12+ Y/O       HYSTERECTOMY, VAGINAL  02/02/10    laparoscopic assisted vaginal.  benign cervix     TEST NOT FOUND  2001    Abdominoplasty (tummy tuck)       Social History     Tobacco Use     Smoking status: Never Smoker     Smokeless tobacco: Never Used   Substance Use Topics     Alcohol use: No     Alcohol/week: 10.0 standard drinks     Family History   Problem Relation Age of Onset     Neurologic Disorder Mother         migraines     Lipids Mother      Arthritis Mother         rheumatoid     Chronic Obstructive Pulmonary Disease Mother         smoker     Hypertension Father      Lipids Father      Blood Disease Father 51        DVT  at age 51 from probable PE     Neurologic Disorder Father         Muscular Dystrophy     Muscular Dystrophy Brother 19     Diabetes Paternal Grandmother      Diabetes Paternal Grandfather      Cancer Paternal Grandfather         colon     Neurologic Disorder Brother         Muscular Dystrophy  at age 19 from an MI     Cancer Paternal Uncle         colon     Asthma Son      Congenital Anomalies Son         epilepsy hydrocephilis         Current Outpatient Medications   Medication Sig Dispense Refill     albuterol (2.5 MG/3ML) 0.083% neb solution Take 1 vial (2.5 mg) by nebulization every 4 hours as needed for wheezing Hold on file. Will  call if needed 30 vial 0     albuterol (PROAIR HFA/PROVENTIL HFA/VENTOLIN HFA) 108 (90 Base) MCG/ACT inhaler Inhale 2 puffs into the lungs every 4 hours as needed for shortness of breath / dyspnea or wheezing 1 Inhaler 0     fluticasone (FLONASE) 50 MCG/ACT nasal spray Spray 2 sprays into both nostrils daily 16 mL 3     guaiFENesin (MUCINEX PO)        MULTIPLE VITAMIN OR TABS 1 TABLET ORALLY DAILY  0     rizatriptan (MAXALT) 10 MG tablet Take 1/2 to 1 tablet by mouth at onset of migraine, may repeat in 2 hours -Max 3 tablet(s) in 24 hours 54 tablet 3     Sodium Chloride-Sodium Bicarb (AYR SALINE NASAL RINSE NA)        traZODone (DESYREL) 50 MG tablet TAKE ONE AND ONE-HALF TABLETS BY MOUTH AT BEDTIME 135 tablet 3     Allergies   Allergen Reactions     Morphine      Prochlorperazine      compazine -dystonic reaction     Seasonal Allergies      Recent Labs   Lab Test 03/15/19  0814 06/17/14  2254 02/27/14  0817   LDL  --   --  95   HDL  --   --  68   TRIG  --   --  58   ALT 38 43  --    CR  --  0.76  --    GFRESTIMATED  --  83  --    GFRESTBLACK  --  >90  --    POTASSIUM  --  3.7  --       BP Readings from Last 3 Encounters:   02/07/20 110/68   01/08/20 112/60   12/12/19 112/64    Wt Readings from Last 3 Encounters:   12/12/19 62.6 kg (138 lb)   11/16/18 64.1 kg (141 lb 6.4 oz)   07/12/18 63.5 kg (140 lb)         Reviewed and updated as needed this visit by Provider       Review of Systems   Constitutional, HEENT, cardiovascular, pulmonary, gi and gu systems are negative, except as otherwise noted.     Objective   Reported vitals:  LMP 02/01/2010    healthy, alert and no distress  PSYCH: Alert and oriented times 3; coherent speech, normal   rate and volume, able to articulate logical thoughts, able   to abstract reason, no tangential thoughts, no hallucinations   or delusions  Her affect is normal and pleasant  Remainder of exam unable to be completed due to telephone visits    Diagnostic Test Results:  Labs reviewed  in Epic  none         Assessment/Plan:  (G47.09) Other insomnia  Comment: working well, she doesn't need any adjustment in dose.  Plan: traZODone (DESYREL) 50 MG tablet        Refill sent to to the pharmacy for her.    Health maintenance issues.  Earlene turns 50 this July and she is aware that she needs colon cancer screening.  She does not have health insurance so we discussed the 3 options including colonoscopy which is upwards of $1800 or more versus doing Cologuard versus FIT testing.  FIT testing would need to be done yearly but is probably the cheapest of all 3 of those options.  If the fit test turns positive then she would need further investigation like a colonoscopy.  She is also due for mammogram but may qualify for a BENJAMIN exam which essentially would give her her mammogram for free.  She may even qualify for a  physical.  She is going to call the clinic and asked to talk to the business office to see how she would get signed up for that program.  She had some recent labs done them in the [facility] including the lipid profile so she will mail and copies of those so we can give her credit for them on our end.    Return in about 6 months (around 12/8/2020) for Physical Exam and mammogram.      Phone call duration:  19 minutes    Electronically signed by:  Kushal Linda M.D.  6/8/2020

## 2020-06-11 ENCOUNTER — MEDICAL CORRESPONDENCE (OUTPATIENT)
Dept: HEALTH INFORMATION MANAGEMENT | Facility: CLINIC | Age: 50
End: 2020-06-11

## 2020-06-12 DIAGNOSIS — G47.09 INITIAL INSOMNIA: ICD-10-CM

## 2020-06-12 DIAGNOSIS — R53.83 FATIGUE: Primary | ICD-10-CM

## 2020-06-12 DIAGNOSIS — F45.8 ANXIETY HYPERVENTILATION: ICD-10-CM

## 2020-06-12 DIAGNOSIS — N91.2 ABSENCE OF MENSTRUATION: ICD-10-CM

## 2020-06-12 DIAGNOSIS — F41.9 ANXIETY HYPERVENTILATION: ICD-10-CM

## 2020-06-16 DIAGNOSIS — G47.09 INITIAL INSOMNIA: ICD-10-CM

## 2020-06-16 DIAGNOSIS — R53.83 FATIGUE: ICD-10-CM

## 2020-06-16 DIAGNOSIS — F41.9 ANXIETY HYPERVENTILATION: ICD-10-CM

## 2020-06-16 DIAGNOSIS — F45.8 ANXIETY HYPERVENTILATION: ICD-10-CM

## 2020-06-16 DIAGNOSIS — N91.2 ABSENCE OF MENSTRUATION: ICD-10-CM

## 2020-06-16 LAB
ESTRADIOL SERPL-MCNC: 61 PG/ML
HGB BLD-MCNC: 13.1 G/DL (ref 11.7–15.7)
PROGEST SERPL-MCNC: 2.7 NG/ML
T3FREE SERPL-MCNC: 2.2 PG/ML (ref 2.3–4.2)
T4 FREE SERPL-MCNC: 1.26 NG/DL (ref 0.76–1.46)
TSH SERPL DL<=0.005 MIU/L-ACNC: 1.53 MU/L (ref 0.4–4)

## 2020-06-16 PROCEDURE — 36415 COLL VENOUS BLD VENIPUNCTURE: CPT | Performed by: REGISTERED NURSE

## 2020-06-16 PROCEDURE — 82670 ASSAY OF TOTAL ESTRADIOL: CPT | Performed by: REGISTERED NURSE

## 2020-06-16 PROCEDURE — 85018 HEMOGLOBIN: CPT | Performed by: REGISTERED NURSE

## 2020-06-16 PROCEDURE — 84481 FREE ASSAY (FT-3): CPT | Performed by: REGISTERED NURSE

## 2020-06-16 PROCEDURE — 84439 ASSAY OF FREE THYROXINE: CPT | Performed by: REGISTERED NURSE

## 2020-06-16 PROCEDURE — 84443 ASSAY THYROID STIM HORMONE: CPT | Performed by: REGISTERED NURSE

## 2020-06-16 PROCEDURE — 84144 ASSAY OF PROGESTERONE: CPT | Performed by: REGISTERED NURSE

## 2020-06-16 PROCEDURE — 84403 ASSAY OF TOTAL TESTOSTERONE: CPT | Performed by: REGISTERED NURSE

## 2020-06-19 LAB — TESTOST SERPL-MCNC: 92 NG/DL (ref 8–60)

## 2020-07-15 DIAGNOSIS — G43.009 MIGRAINE WITHOUT AURA AND WITHOUT STATUS MIGRAINOSUS, NOT INTRACTABLE: ICD-10-CM

## 2020-07-16 RX ORDER — RIZATRIPTAN BENZOATE 10 MG/1
TABLET ORAL
Qty: 54 TABLET | Refills: 3 | Status: SHIPPED | OUTPATIENT
Start: 2020-07-16 | End: 2021-02-09

## 2020-07-16 NOTE — TELEPHONE ENCOUNTER
Prescription approved per [facility] Refill Protocol.    Radha Toney RN

## 2020-08-06 ENCOUNTER — VIRTUAL VISIT (OUTPATIENT)
Dept: FAMILY MEDICINE | Facility: CLINIC | Age: 50
End: 2020-08-06

## 2020-08-06 ENCOUNTER — PATIENT OUTREACH (OUTPATIENT)
Dept: CARE COORDINATION | Facility: CLINIC | Age: 50
End: 2020-08-06

## 2020-08-06 DIAGNOSIS — Z59.71 DOES NOT HAVE HEALTH INSURANCE: ICD-10-CM

## 2020-08-06 DIAGNOSIS — N63.0 LUMP OR MASS IN BREAST: Primary | ICD-10-CM

## 2020-08-06 DIAGNOSIS — J45.909 REACTIVE AIRWAY DISEASE WITHOUT COMPLICATION, UNSPECIFIED ASTHMA SEVERITY, UNSPECIFIED WHETHER PERSISTENT: ICD-10-CM

## 2020-08-06 PROCEDURE — 99214 OFFICE O/P EST MOD 30 MIN: CPT | Mod: 95 | Performed by: PHYSICIAN ASSISTANT

## 2020-08-06 RX ORDER — LEVOCETIRIZINE DIHYDROCHLORIDE 5 MG/1
5 TABLET, FILM COATED ORAL EVERY EVENING
COMMUNITY
End: 2021-03-10

## 2020-08-06 NOTE — PROGRESS NOTES
Clinic Care Coordination Contact  [facility]/Voicemail       Clinical Data: [facility]W Outreach  Outreach attempted x 1. Left message on patient's voicemail with call back information and requested return call.    Plan: [facility] will try to reach patient again in 1-2 business days.    Yecenia George  [facility] Health Worker   [facility]  [email or web address]   Office: [phone]  Fax: [phone]

## 2020-08-06 NOTE — PATIENT INSTRUCTIONS
Patient Education     Breast Lump, Uncertain Cause    A lump was found in your breast. Most breast lumps are not cancer. They may be caused by normal changes in the breast tissue due to hormone variations that occur with your menstrual cycle. Some women may form lumps that are painful and tender. Others may form lumps that are painless.  At this time, is not possible to be certain of the cause of your lump without further evaluation. This could include:    Another exam by your healthcare provider or a gynecologist    Imaging tests, such as a mammogram or ultrasound    Biopsy (procedure to remove small tissue samples from the breast lump)  Your healthcare provider will explain any additional testing that is needed. Be sure to get answers to any questions you may have.  Home care  Until a diagnosis is made, you may be advised to do the following:    If you are having breast pain:  ? Take an over-the-counter pain reliever, if directed to by your provider.  ? Wear a well-fitted bra or sports bra for extra support. If you have breast pain at night, try wearing the bra during sleep.  ? Apply a warm compress (towel soaked in warm water) to the breast. You may also use a hot water bottle.    Check your breasts each day. Keep a log of whether the lump seems to be changing in size or tenderness with your period. This can help your healthcare provider make the correct diagnosis.  Follow-up care  Follow up with your healthcare provider, or as directed. Keep all appointments. Also, prepare for any upcoming tests as directed.  When to seek medical advice  Call your healthcare provider right away if any of these occur:    Fever of 100.4 F (38 C) or higher    Redness or swelling of the breast    Discharge from the nipple    Visible changes in the skin over the nipple or breast    Lump grows larger, feels very hard, or has an irregular shape    New lumps form  Date Last Reviewed: 3/1/2017    9046-1289 [employer]  [address] [place] [address]. All rights reserved. This information is not intended as a substitute for professional medical care. Always follow your healthcare professional's instructions.           Patient Education     Lymphadenopathy  Lymphadenopathy is swelling of the lymph nodes. Lymph nodes are small, bean-shaped glands around the body.  What are lymph nodes?  Lymph nodes are part of your immune system. These glands are found in your neck, over your clavicle, armpits, groin, chest, and abdomen. They act as filters for lymph fluid as it flows through your body. Lymph fluid contains white blood cells (lymphocytes) that help the body fight infection and disease.   Why lymph nodes swell  Lymphadenopathy is very common. The glands often enlarge during a viral or bacterial infection. It can happen during a cold, the flu, or strep throat. The nodes may swell in just one area of the body, such as the neck (localized). Or nodes may swell all over the body (generalized). The neck (cervical) lymph nodes are the most common site of lymphadenopathy.  What causes lymphadenopathy?  Dead cells and fluid build up in the lymph nodes as they help fight infection or disease. This causes them to swell in size. Enlarged lymph nodes are often near the source of infection. This can help to find the cause of an infection. For example, swollen lymph nodes around the jaw may be because of an infection in the teeth or mouth. But lymphadenopathy may also be generalized. This is common in some viral illnesses such as infectious mononucleosis, HIV or chickenpox (varicella).  Lymphadenopathy can also be caused by:    Infection of a lymph node or small group of nodes (lymphadenitis)    Cancer    Reactions to medicines such as antibiotics and certain blood pressure, gout, and seizure medicines    Other health conditions, such as lupus or sarcoidosis  Symptoms of lymphadenopathy  Lymphadenopathy can cause symptoms such  as:    Lumps under the jaw, on the sides or back of the neck, in the armpits, in the groin, or in the chest or belly (abdomen)    Pain or tenderness in any of these areas    Redness or warmth in any of these areas  You may also have symptoms from an infection causing the swollen glands. These symptoms may include fever, sore throat, body aches, or cough.  Diagnosing lymphadenopathy  Your healthcare provider will ask about your health history and symptoms. He or she will give you a physical exam and check the areas where lymph nodes are enlarged. Your healthcare provider will check the size. texture, and location of the nodes, and ask how long they have been swollen and if they are painful. Diagnostic tests and referral to specialists may be recommended. They may include:    Blood tests. These are done to check for signs of infection and other problems.    Urine test. This is also done to check for infection and other problems.    Chest X-ray, ultrasound, CT scan, or MRI scans. These tests can show enlarged lymph nodes or other problems.    Lymph node biopsy. The cause of enlarged lymph nodes may be checked with a biopsy. Small samples of lymph node tissue are taken and checked in a lab for signs of infection, cancer and other causes. You may be referred to other specialistsfor their opinion as well.  Treatment for lymphadenopathy  The treatment of enlarged lymph nodes depends on the cause. Enlarged lymph nodes are often harmless and go away without any treatment. Treatment is most often done on the cause of the enlarged nodes and may include:    Antibiotic or antiviral medicine to treat a bacterial or viral infection    Incision and drainage of a lymph node for lymphadenitis    Other medicines or procedures to treat the cause of the enlarged nodes  You may need a follow-up exam in 3 to 4 weeks to recheck enlarged nodes.  When to call your healthcare provider  Call your healthcare provider if your symptoms worsen,  you develop new symptoms, you have a fever of 100.4 F (38 C) or higher, lymph nodes that are still swollen after 3 to 4 weeks, or as directed by your healthcare provider.  Date Last Reviewed: 5/1/2017 2000-2019 [employer]. [address] [place] [address]. All rights reserved. This information is not intended as a substitute for professional medical care. Always follow your healthcare professional's instructions.           Patient Education     Understanding Asthma    Asthma is a long-term (chronic) lung condition. It involves the airways (bronchial tubes). It happens when a trigger causes your airways to swell and become narrow. The muscles around your airways start to tighten. When your airways start to narrow, air can't move in and out of your lungs very well. Mucus also builds up along the airways. This makes it even harder to move air in and out of your lungs.   Experts are not exactly sure what causes asthma. It may be caused by a mix of inherited and environmental factors. People with asthma may have no symptoms until they are exposed to an allergen or trigger.  Healthy lungs  Inside your lungs there are branching airways made of stretchy tissue. Each airway is wrapped with bands of muscle. The airways are smaller as they go deeper into the lungs. The smallest airways end in clusters of tiny balloon-like air sacs (alveoli). These clusters are surrounded by blood vessels. When you breathe in (inhale), air enters the lungs. It travels down through the airways until it reaches the air sacs. When you breathe out (exhale), air travels up through the airways and out of the lungs. The airways make mucus that traps particles you breathe in. Normally, the mucus is then swept out of the lungs by tiny hairs (cilia) that line the airways. The mucus is swallowed or coughed up during your day.  What the lungs do  The air you inhale contains oxygen. When oxygen reaches the air sacs, it passes into  the blood vessels around the sacs. Your blood then sends oxygen to all of your cells. As you exhale, carbon dioxide is removed in a similar way from the blood in the air sacs, and from your body.  When you have asthma  People with asthma have very sensitive airways. This means the airways react to certain things called triggers. Triggers can include pollen, dust, or smoke. Triggers cause inflammation. This makes the airways swell and become narrow. This is a long-lasting (chronic) problem. Your airways may not always be narrow enough so that you notice breathing problems.  Symptoms of chronic inflammation include:     Coughing (chronic)    A feeling of tightness in your chest    Feeling short of breath    Wheezing (a whistling noise, especially when breathing out)    Low energy or feeling tired  In some people, over time chronic mild inflammation can lead to lasting (permanent) scarring of airways and loss of lung function.  Asthma flare-ups  When sensitive airways are irritated by a trigger, the muscles around the airways tighten. The lining of the airways swells. Thick, sticky mucus increases and partly clogs the airways. All of this makes it harder to breathe.  Symptoms of flare-ups may include:    Coughing, especially at night. You may not be able to sleep because of coughing.    Getting tired or out of breath easily    Wheezing    Chest tightness    Faster breathing when at rest  Flare-ups can be life-threatening. In a severe flare-up, the muscle tightening, swelling, and mucus are worse. It s very hard to breathe. Your body can't get enough oxygen and can't remove carbon dioxide. Waste gas is trapped in the alveoli. Gas exchange can t occur. The body is not getting enough oxygen. Without oxygen, body tissues, especially brain tissue, begin to get damaged. If this goes on for long, it can lead to severe brain damage or death.  Call 911 (or have someone call for you) if you have any of these symptoms and they  are not relieved right away by taking your quick-relief medicine as prescribed:    Trouble breathing    Feeling too short of breath to talk or walk    Lips or fingers turning blue    Feeling lightheaded or dizzy, as though you are about to pass out    Peak flow less than 50% of your personal best, if you use a peak flow meter  Managing your asthma  Asthma is a long-term condition. So it s important to work with your healthcare provider to manage it. If you have asthma, you can prevent flare-ups. Develop an asthma action plan with your healthcare provider. It can help control your asthma and manage your symptoms. An asthma action plan also tells you and your family or friends what to do if your asthma flares up or gets worse.  Take your medicine as prescribed. Also learn about your asthma triggers. Knowing what causes your asthma to flare up in the first place can help you prevent future breathing problems.  If you smoke, get help to quit.  Date Last Reviewed: 3/1/2019    1796-5564 [employer]. [address] [place] [address], [place] [address]. All rights reserved. This information is not intended as a substitute for professional medical care. Always follow your healthcare professional's instructions.

## 2020-08-06 NOTE — PROGRESS NOTES
"Earlene Mark is a 50 year old female who is being evaluated via a billable video visit.      The patient has been notified of following:     \"This video visit will be conducted via a call between you and your physician/provider. We have found that certain health care needs can be provided without the need for an in-person physical exam.  This service lets us provide the care you need with a video conversation.  If a prescription is necessary we can send it directly to your pharmacy.  If lab work is needed we can place an order for that and you can then stop by our lab to have the test done at a later time.    Video visits are billed at different rates depending on your insurance coverage.  Please reach out to your insurance provider with any questions.    If during the course of the call the physician/provider feels a video visit is not appropriate, you will not be charged for this service.\"    Patient has given verbal consent for Video visit? Yes  How would you like to obtain your AVS? MyChart  If you are dropped from the video visit, the video invite should be resent to: Text to cell phone: [phone]  Will anyone else be joining your video visit? No    Subjective     Earlene Mark is a 50 year old female who presents today via video visit for the following health issues:    HPI    Chief Complaint   Patient presents with     Mass     lump in chest under collarbone;  size of palm of hand; tender to the touch       Patient is a 50 year old female who calls in today to discuss a mass that has appear under the right clavicle and top of right breast tissue. She informs me that she has just started hormonal replacement therapy for menopause through another in the [facility]. She cannot recall any injury or illness occurring prior to development of the mass. Estimates the mass is about the size of her palm. Associated symptoms include up/down energy, some increased stress at night and feeling the need for her albuterol " inhaler more often. Denies night sweats, unexplained weight loss, history of BRCA or masses, chest pain/pressure, trouble breathing, changes to digestion/bowel or bladder. The patient does not have health insurance, I see that PCP had advised her to seek out the BENJAMIN program. I will have care coordination call patient to help facilitate this process.       Reviewed and updated as needed this visit by Provider         Review of Systems   Constitutional, HEENT, cardiovascular, pulmonary, gi and gu systems are negative, except as otherwise noted.      Objective             Physical Exam     GENERAL: Healthy, alert and no distress  RESP: No audible wheeze, cough  NEURO: Mentation and speech appropriate for age.  PSYCH: Mentation appears normal, affect normal/bright, judgement and insight intact, normal speech      Diagnostic Test Results:  Labs reviewed in Epic        Assessment & Plan     1. Lump or mass in breast  This complaint is not appropriate for any type of virtual visit as there is not method in which I would have been able to perform the necessary exam to evaluate this mass further. As such I have limited diagnostic capabilities today. I have order US and mammogram for the patient as well as alterted her PCP as I will be out next week. She will have information on breast lumps and what to monitor for.   - MA Diagnostic Digital Bilateral; Future  - US Breast Bilateral Complete 4 Quadrants; Future    2. Does not have health insurance  Care coord will help to see if the patient can qualify for any form of health care coverage.   - CARE COORDINATION REFERRAL    3. Reactive airway disease without complication, unspecified asthma severity, unspecified whether persistent  Patient will speak with pharmacist on whether there is an affordable ICS or combination inhaler. If there is she will call myself or PCP to have it sent out.             Toan Lewis PA-C  [facility]      Telephone: 12  min    Toan Lewis PA-C

## 2020-08-07 ENCOUNTER — TELEPHONE (OUTPATIENT)
Dept: FAMILY MEDICINE | Facility: CLINIC | Age: 50
End: 2020-08-07

## 2020-08-07 ENCOUNTER — PATIENT OUTREACH (OUTPATIENT)
Dept: FAMILY MEDICINE | Facility: CLINIC | Age: 50
End: 2020-08-07

## 2020-08-07 NOTE — TELEPHONE ENCOUNTER
----- Message from Toan Lewis PA-C sent at 8/6/2020 11:18 AM CDT -----  Dr. Linda,    I wanted to loop you on in this patient as I will be off all week next week. I completed a phone visit with her today on breast mass. Yes, I agree that it is not an appropriate visit for that concern. She said that she has a palm sized mass under the right clavicle/top of right breast tissue which has developed over the past several weeks. She also informs me that she has recently started hormonal supplementation for menopause. I have placed orders for diagnostic US and mammo. As I will not be in next week and you were familiar with the patient I thought I would make you aware.     Thanks, have a great day.  Toan Lewis PA-C on 8/6/2020 at 11:21 AM

## 2020-08-07 NOTE — PROGRESS NOTES
Clinic Care Coordination Contact  Community Health Worker Initial Outreach            Patient accepts CC: No, The patient decliend Care Coordiantion services at this time. . Patient will be sent Care Coordination introduction letter for future reference.     The Clinic Community Health Worker spoke with the patient today at the request of the PCP to discuss possible Clinic Care Coordination enrollment. The service was described to the patient and immediate needs were discussed. The patient declined enrollment at this time. The PCP is encouraged to refer in the future if the patient's needs change.    The patient and her  looked into getting help and feel that they make too much money and is going to pay cash at this time. The CHW offered to give the patient the BENJAMIN program number for the future and the patient declined at this time.     Yecenia George  [facility] Health Worker   [facility]  [email or web address]   Office: [phone]  Fax: [phone]

## 2020-08-10 ENCOUNTER — HOSPITAL ENCOUNTER (OUTPATIENT)
Dept: ULTRASOUND IMAGING | Facility: CLINIC | Age: 50
End: 2020-08-10
Attending: PHYSICIAN ASSISTANT

## 2020-08-10 ENCOUNTER — MYC MEDICAL ADVICE (OUTPATIENT)
Dept: FAMILY MEDICINE | Facility: CLINIC | Age: 50
End: 2020-08-10

## 2020-08-10 ENCOUNTER — RESULT FOLLOW UP (OUTPATIENT)
Dept: FAMILY MEDICINE | Facility: CLINIC | Age: 50
End: 2020-08-10

## 2020-08-10 ENCOUNTER — HOSPITAL ENCOUNTER (OUTPATIENT)
Dept: MAMMOGRAPHY | Facility: CLINIC | Age: 50
End: 2020-08-10
Attending: PHYSICIAN ASSISTANT

## 2020-08-10 DIAGNOSIS — N63.0 LUMP OR MASS IN BREAST: ICD-10-CM

## 2020-08-10 DIAGNOSIS — G47.09 OTHER INSOMNIA: ICD-10-CM

## 2020-08-10 PROCEDURE — G0279 TOMOSYNTHESIS, MAMMO: HCPCS

## 2020-08-10 PROCEDURE — 76642 ULTRASOUND BREAST LIMITED: CPT | Mod: LT

## 2020-08-12 RX ORDER — TRAZODONE HYDROCHLORIDE 50 MG/1
50-100 TABLET, FILM COATED ORAL AT BEDTIME
Qty: 180 TABLET | Refills: 3 | Status: SHIPPED | OUTPATIENT
Start: 2020-08-12 | End: 2020-08-17

## 2020-08-12 NOTE — TELEPHONE ENCOUNTER
Patient advised on change in order. She would like this sent to [facility] instead of [facility] pharmacy. Her prescriptions are less expensive this way. Please resend RX to her other pharmacy.  Earlene Roche CMA

## 2020-08-12 NOTE — TELEPHONE ENCOUNTER
I changed it to 5200 mg at bedtime so she would get 180 tablets per 3 months.    Electronically signed by:  Kushal Linda M.D.  8/12/2020

## 2020-08-12 NOTE — TELEPHONE ENCOUNTER
Reason for Call:  Medication or medication refill:    Do you use a [facility]?  Name of the pharmacy and phone number for the current request:  [facility]     Name of the medication requested: Trazadone     Other request: Earlene is asking for a increase of quantity to allow her to take more if needed for sleep.     Can we leave a detailed message on this number? YES    Phone number patient can be reached at: Home number on file [phone] (home)    Best Time: any       Call taken on 8/12/2020 at 9:11 AM by Sherrie Storey

## 2020-08-13 ENCOUNTER — MEDICAL CORRESPONDENCE (OUTPATIENT)
Dept: HEALTH INFORMATION MANAGEMENT | Facility: CLINIC | Age: 50
End: 2020-08-13

## 2020-08-15 DIAGNOSIS — R53.83 FATIGUE: ICD-10-CM

## 2020-08-15 DIAGNOSIS — F41.9 ANXIETY: ICD-10-CM

## 2020-08-15 DIAGNOSIS — G47.09 INITIAL INSOMNIA: ICD-10-CM

## 2020-08-15 DIAGNOSIS — N91.2 ABSENCE OF MENSTRUATION: Primary | ICD-10-CM

## 2020-08-17 DIAGNOSIS — F41.9 ANXIETY: ICD-10-CM

## 2020-08-17 DIAGNOSIS — G47.09 INITIAL INSOMNIA: ICD-10-CM

## 2020-08-17 DIAGNOSIS — R53.83 FATIGUE: ICD-10-CM

## 2020-08-17 DIAGNOSIS — N91.2 ABSENCE OF MENSTRUATION: ICD-10-CM

## 2020-08-17 LAB
ESTRADIOL SERPL-MCNC: 45 PG/ML
HGB BLD-MCNC: 12.7 G/DL (ref 11.7–15.7)
PROGEST SERPL-MCNC: 6 NG/ML
T3FREE SERPL-MCNC: 2 PG/ML (ref 2.3–4.2)
T4 FREE SERPL-MCNC: 0.94 NG/DL (ref 0.76–1.46)

## 2020-08-17 PROCEDURE — 82670 ASSAY OF TOTAL ESTRADIOL: CPT | Performed by: REGISTERED NURSE

## 2020-08-17 PROCEDURE — 84144 ASSAY OF PROGESTERONE: CPT | Performed by: REGISTERED NURSE

## 2020-08-17 PROCEDURE — 36415 COLL VENOUS BLD VENIPUNCTURE: CPT | Performed by: REGISTERED NURSE

## 2020-08-17 PROCEDURE — 84481 FREE ASSAY (FT-3): CPT | Performed by: REGISTERED NURSE

## 2020-08-17 PROCEDURE — 82306 VITAMIN D 25 HYDROXY: CPT | Performed by: REGISTERED NURSE

## 2020-08-17 PROCEDURE — 82627 DEHYDROEPIANDROSTERONE: CPT | Performed by: REGISTERED NURSE

## 2020-08-17 PROCEDURE — 84403 ASSAY OF TOTAL TESTOSTERONE: CPT | Performed by: REGISTERED NURSE

## 2020-08-17 PROCEDURE — 85018 HEMOGLOBIN: CPT | Performed by: REGISTERED NURSE

## 2020-08-17 PROCEDURE — 84439 ASSAY OF FREE THYROXINE: CPT | Performed by: REGISTERED NURSE

## 2020-08-17 RX ORDER — TRAZODONE HYDROCHLORIDE 50 MG/1
50-100 TABLET, FILM COATED ORAL AT BEDTIME
Qty: 180 TABLET | Refills: 6 | Status: SHIPPED | OUTPATIENT
Start: 2020-08-17 | End: 2021-04-28

## 2020-08-18 LAB
DEPRECATED CALCIDIOL+CALCIFEROL SERPL-MC: 96 UG/L (ref 20–75)
DHEA-S SERPL-MCNC: 446 UG/DL (ref 35–430)

## 2020-08-19 LAB — TESTOST SERPL-MCNC: 200 NG/DL (ref 8–60)

## 2020-08-25 ENCOUNTER — TRANSFERRED RECORDS (OUTPATIENT)
Dept: HEALTH INFORMATION MANAGEMENT | Facility: CLINIC | Age: 50
End: 2020-08-25

## 2020-08-28 ENCOUNTER — VIRTUAL VISIT (OUTPATIENT)
Dept: ENDOCRINOLOGY | Facility: CLINIC | Age: 50
End: 2020-08-28

## 2020-08-28 DIAGNOSIS — E28.1 HYPERANDROGENEMIA: ICD-10-CM

## 2020-08-28 DIAGNOSIS — E03.9 HYPOTHYROIDISM, UNSPECIFIED TYPE: Primary | ICD-10-CM

## 2020-08-28 DIAGNOSIS — Z78.0 MENOPAUSE: ICD-10-CM

## 2020-08-28 PROCEDURE — 99204 OFFICE O/P NEW MOD 45 MIN: CPT | Mod: 95 | Performed by: INTERNAL MEDICINE

## 2020-08-28 RX ORDER — MELATONIN 3 MG
1 TABLET ORAL DAILY
COMMUNITY
End: 2022-12-30

## 2020-08-28 RX ORDER — UBIDECARENONE 100 MG
100 CAPSULE ORAL DAILY
COMMUNITY

## 2020-08-28 RX ORDER — VIT C/HESPERIDIN/BIOFLAVONOIDS 500-100 MG
2 TABLET ORAL AT BEDTIME
COMMUNITY

## 2020-08-28 RX ORDER — CYANOCOBALAMIN (VITAMIN B-12) 5000 MCG
1 TABLET,DISINTEGRATING ORAL DAILY
COMMUNITY

## 2020-08-28 NOTE — LETTER
"    8/28/2020         RE: Earlene Mark  [address]  [place], [place] [address]        Dear Colleague,    Thank you for referring your patient, Earlene Mark, to the [facility]. Please see a copy of my visit note below.    Earlene Mark is a 50 year old female who is being evaluated via a billable video visit.      The patient has been notified of following:     \"This video visit will be conducted via a call between you and your physician/provider. We have found that certain health care needs can be provided without the need for an in-person physical exam.  This service lets us provide the care you need with a video conversation.  If a prescription is necessary we can send it directly to your pharmacy.  If lab work is needed we can place an order for that and you can then stop by our lab to have the test done at a later time.    Video visits are billed at different rates depending on your insurance coverage.  Please reach out to your insurance provider with any questions.    If during the course of the call the physician/provider feels a video visit is not appropriate, you will not be charged for this service.\"    Patient has given verbal consent for Video visit? Yes  How would you like to obtain your AVS? MyChart  If you are dropped from the video visit, the video invite should be resent to: Text to cell phone: cell  Will anyone else be joining your video visit? No        Video-Visit Details    Type of service:  Video Visit    Video Start Time:2:45 pm  End: 3:35 pm    Originating Location (pt. Location): Home    Distant Location (provider location):  [facility]     Platform used for Video Visit: Doximity                                                                               - Endocrinology Initial Consultation -    Reason for visit/consult: hypothyroidism    Primary care provider: Kushal Linda    HPI: A 49 yo female here for the evaluation for her thyroid " condition and also elevated tesotsterone level.  Had hysterectomy in  and [facility] for menopause evaluation last year.  Upon initial evaluation her total testosterone was 11.4 and estradiol was 66 FSH was not measured and she was started on testosterone citlaly.  This is an acute increase in 2022 and  total testosterone and 2020 total testosterone was 200. Her testosterone dose was 2 mg and now 1.5 mg.   She started to have acnes in her occipital head. But no voice changes.   She does weight lifting.   She has cold intolerance, family history of thyroid disease, her free T3 was lower side  2.2, then she was started on iodine drops, but free T3 was still no change. Energy level: good, recently has difficulty to sleep.   Also taking progesterone 150 mg daily      Past Medical/Surgical History:  Past Medical History:   Diagnosis Date     Abnormal Papanicolaou smear of cervix and cervical HPV     none since LEEP     Allergic rhinitis, cause unspecified     ? 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  benign cervix     TEST NOT FOUND  2001    Abdominoplasty (tummy tuck)       Allergies:  Allergies   Allergen Reactions     Morphine      Prochlorperazine      compazine -dystonic reaction     Seasonal Allergies        Current Medications   Current Outpatient  Medications   Medication     albuterol (2.5 MG/3ML) 0.083% neb solution     albuterol (PROAIR HFA/PROVENTIL HFA/VENTOLIN HFA) 108 (90 Base) MCG/ACT inhaler     co-enzyme Q-10 100 MG CAPS capsule     Cyanocobalamin (VITAMIN B-12) 5000 MCG TBDP     DHEA 10 MG TABS     fluticasone (FLONASE) 50 MCG/ACT nasal spray     levocetirizine (XYZAL) 5 MG tablet     MULTIPLE VITAMIN OR TABS     Omega-3 Fatty Acids (OMEGA 3 PO)     PROGESTERONE MICRONIZED PO     rizatriptan (MAXALT) 10 MG tablet     Sodium Chloride-Sodium Bicarb (AYR SALINE NASAL RINSE NA)     TESTOSTERONE 2MG     traZODone (DESYREL) 50 MG tablet     Zinc 30 MG TABS     No current facility-administered medications for this visit.        Family History:  Family History   Problem Relation Age of Onset     Neurologic Disorder Mother         migraines     Lipids Mother      Arthritis Mother         rheumatoid     Chronic Obstructive Pulmonary Disease Mother         smoker     Hypertension Father      Lipids Father      Blood Disease Father 51        DVT  at age 51 from probable PE     Neurologic Disorder Father         Muscular Dystrophy     Muscular Dystrophy Brother 19     Diabetes Paternal Grandmother      Diabetes Paternal Grandfather      Cancer Paternal Grandfather         colon     Neurologic Disorder Brother         Muscular Dystrophy  at age 19 from an MI     Cancer Paternal Uncle         colon     Asthma Son      Congenital Anomalies Son         epilepsy hydrocephilis       Social History:  Social History     Tobacco Use     Smoking status: Never Smoker     Smokeless tobacco: Never Used   Substance Use Topics     Alcohol use: No     Alcohol/week: 10.0 standard drinks   3 adult kids (27, 25, 22).     ROS:  Full review of systems taken with the help of the intake sheet. Otherwise a complete 14 point review of systems was taken and is negative unless stated in the history above.      Physical Exam:   Vitals: LMP 2010   BMI= There is no height or  weight on file to calculate BMI.   General: well appearing, no acute distress, pleasant and conversant,   Mental Status/neuro: alert and oriented  Face: symmetrical, normal facial color, no hirsutism  Eyes: anicteric, no proptosis or lid lag  Sensation  Resp: no acute ditress        Labs : I reviewed data from epic and extract and summarize the pertinent data here.   Lab Results   Component Value Date     06/17/2014      Lab Results   Component Value Date    POTASSIUM 3.7 06/17/2014     Lab Results   Component Value Date    CHLORIDE 106 06/17/2014     Lab Results   Component Value Date    PARI 9.6 06/17/2014     Lab Results   Component Value Date    CO2 20 06/17/2014     Lab Results   Component Value Date    BUN 19 06/17/2014     Lab Results   Component Value Date    CR 0.76 06/17/2014     Lab Results   Component Value Date     06/17/2014     Lab Results   Component Value Date    TSH 1.53 06/16/2020     Lab Results   Component Value Date    T4 0.94 08/17/2020     Lab Results   Component Value Date    A1C 4.5 04/30/2003       No results found for: IGF1  No results found for: LH  No results found for: FSH  Lab Results   Component Value Date    ESTROGEN 45 08/17/2020     No results found for: PROLACTIN        Assessment and Plan  50 year old female with free T3 low, menopause, currently elevated testosterone      - Recommend to switch from testosterone to estradiol but she wants to stay on testosterone for now. In that case, she need to reduce to dose, current dose if overmedicated (tota testosterone 200).     - TSH  - free T4  - free T3  - total T3  - TPO     RTC with me PRN (if need thryoid meds to start)      Bernie Cook MD  Staff Physician  Endocrinology and Metabolism  License: [id number]        Again, thank you for allowing me to participate in the care of your patient.        Sincerely,        Bernie Cook MD

## 2020-08-28 NOTE — PROGRESS NOTES
"Earlene Mark is a 50 year old female who is being evaluated via a billable video visit.      The patient has been notified of following:     \"This video visit will be conducted via a call between you and your physician/provider. We have found that certain health care needs can be provided without the need for an in-person physical exam.  This service lets us provide the care you need with a video conversation.  If a prescription is necessary we can send it directly to your pharmacy.  If lab work is needed we can place an order for that and you can then stop by our lab to have the test done at a later time.    Video visits are billed at different rates depending on your insurance coverage.  Please reach out to your insurance provider with any questions.    If during the course of the call the physician/provider feels a video visit is not appropriate, you will not be charged for this service.\"    Patient has given verbal consent for Video visit? Yes  How would you like to obtain your AVS? MyChart  If you are dropped from the video visit, the video invite should be resent to: Text to cell phone: cell  Will anyone else be joining your video visit? No        Video-Visit Details    Type of service:  Video Visit    Video Start Time:2:45 pm  End: 3:35 pm    Originating Location (pt. Location): Home    Distant Location (provider location):  [facility]     Platform used for Video Visit: Doximity                                                                               - Endocrinology Initial Consultation -    Reason for visit/consult: hypothyroidism    Primary care provider: Kushal Linda    HPI: A 49 yo female here for the evaluation for her thyroid condition and also elevated tesotsterone level.  Had hysterectomy in 2010 and [facility] for menopause evaluation last year.  Upon initial evaluation her total testosterone was 11.4 and estradiol was 66 FSH was not measured and she was " started on testosterone citlaly.  This is an acute increase in 2022 and  total testosterone and 2020 total testosterone was 200. Her testosterone dose was 2 mg and now 1.5 mg.   She started to have acnes in her occipital head. But no voice changes.   She does weight lifting.   She has cold intolerance, family history of thyroid disease, her free T3 was lower side  2.2, then she was started on iodine drops, but free T3 was still no change. Energy level: good, recently has difficulty to sleep.   Also taking progesterone 150 mg daily      Past Medical/Surgical History:  Past Medical History:   Diagnosis Date     Abnormal Papanicolaou smear of cervix and cervical HPV     none since LEEP     Allergic rhinitis, cause unspecified     ? 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  benign cervix     TEST NOT FOUND  2001    Abdominoplasty (tummy tuck)       Allergies:  Allergies   Allergen Reactions     Morphine      Prochlorperazine      compazine -dystonic reaction     Seasonal Allergies        Current Medications   Current Outpatient Medications   Medication     albuterol (2.5 MG/3ML) 0.083% neb solution     albuterol (PROAIR HFA/PROVENTIL HFA/VENTOLIN HFA) 108 (90 Base) MCG/ACT inhaler     co-enzyme Q-10 100 MG CAPS capsule     Cyanocobalamin (VITAMIN B-12) 5000 MCG TBDP     DHEA 10  MG TABS     fluticasone (FLONASE) 50 MCG/ACT nasal spray     levocetirizine (XYZAL) 5 MG tablet     MULTIPLE VITAMIN OR TABS     Omega-3 Fatty Acids (OMEGA 3 PO)     PROGESTERONE MICRONIZED PO     rizatriptan (MAXALT) 10 MG tablet     Sodium Chloride-Sodium Bicarb (AYR SALINE NASAL RINSE NA)     TESTOSTERONE 2MG     traZODone (DESYREL) 50 MG tablet     Zinc 30 MG TABS     No current facility-administered medications for this visit.        Family History:  Family History   Problem Relation Age of Onset     Neurologic Disorder Mother         migraines     Lipids Mother      Arthritis Mother         rheumatoid     Chronic Obstructive Pulmonary Disease Mother         smoker     Hypertension Father      Lipids Father      Blood Disease Father 51        DVT  at age 51 from probable PE     Neurologic Disorder Father         Muscular Dystrophy     Muscular Dystrophy Brother 19     Diabetes Paternal Grandmother      Diabetes Paternal Grandfather      Cancer Paternal Grandfather         colon     Neurologic Disorder Brother         Muscular Dystrophy  at age 19 from an MI     Cancer Paternal Uncle         colon     Asthma Son      Congenital Anomalies Son         epilepsy hydrocephilis       Social History:  Social History     Tobacco Use     Smoking status: Never Smoker     Smokeless tobacco: Never Used   Substance Use Topics     Alcohol use: No     Alcohol/week: 10.0 standard drinks   3 adult kids (27, 25, 22).     ROS:  Full review of systems taken with the help of the intake sheet. Otherwise a complete 14 point review of systems was taken and is negative unless stated in the history above.      Physical Exam:   Vitals: LMP 2010   BMI= There is no height or weight on file to calculate BMI.   General: well appearing, no acute distress, pleasant and conversant,   Mental Status/neuro: alert and oriented  Face: symmetrical, normal facial color, no hirsutism  Eyes: anicteric, no proptosis or lid  lag  Sensation  Resp: no acute ditress        Labs : I reviewed data from epic and extract and summarize the pertinent data here.   Lab Results   Component Value Date     06/17/2014      Lab Results   Component Value Date    POTASSIUM 3.7 06/17/2014     Lab Results   Component Value Date    CHLORIDE 106 06/17/2014     Lab Results   Component Value Date    PARI 9.6 06/17/2014     Lab Results   Component Value Date    CO2 20 06/17/2014     Lab Results   Component Value Date    BUN 19 06/17/2014     Lab Results   Component Value Date    CR 0.76 06/17/2014     Lab Results   Component Value Date     06/17/2014     Lab Results   Component Value Date    TSH 1.53 06/16/2020     Lab Results   Component Value Date    T4 0.94 08/17/2020     Lab Results   Component Value Date    A1C 4.5 04/30/2003       No results found for: IGF1  No results found for: LH  No results found for: FSH  Lab Results   Component Value Date    ESTROGEN 45 08/17/2020     No results found for: PROLACTIN        Assessment and Plan  50 year old female with free T3 low, menopause, currently elevated testosterone      - Recommend to switch from testosterone to estradiol but she wants to stay on testosterone for now. In that case, she need to reduce to dose, current dose if overmedicated (tota testosterone 200).     - TSH  - free T4  - free T3  - total T3  - TPO     RTC with me PRN (if need thryoid meds to start)      Bernie Cook MD  Staff Physician  Endocrinology and Metabolism  License: [id number]

## 2020-08-31 DIAGNOSIS — E03.9 HYPOTHYROIDISM, UNSPECIFIED TYPE: ICD-10-CM

## 2020-08-31 LAB
T3 SERPL-MCNC: 105 NG/DL (ref 60–181)
TSH SERPL DL<=0.005 MIU/L-ACNC: 1.63 MU/L (ref 0.4–4)

## 2020-08-31 PROCEDURE — 84480 ASSAY TRIIODOTHYRONINE (T3): CPT | Performed by: INTERNAL MEDICINE

## 2020-08-31 PROCEDURE — 36415 COLL VENOUS BLD VENIPUNCTURE: CPT | Performed by: INTERNAL MEDICINE

## 2020-08-31 PROCEDURE — 86376 MICROSOMAL ANTIBODY EACH: CPT | Performed by: INTERNAL MEDICINE

## 2020-08-31 PROCEDURE — 84443 ASSAY THYROID STIM HORMONE: CPT | Performed by: INTERNAL MEDICINE

## 2020-09-01 LAB — THYROPEROXIDASE AB SERPL-ACNC: <10 IU/ML

## 2020-09-02 ENCOUNTER — RX ONLY (RX ONLY)
Age: 50
End: 2020-09-02

## 2020-09-02 RX ORDER — LEVOCETIRIZINE DIHYDROCHLORIDE 5 MG
TABLET ORAL
Qty: 90 | Refills: 0 | Status: ERX

## 2020-11-04 ENCOUNTER — APPOINTMENT (OUTPATIENT)
Dept: URBAN - METROPOLITAN AREA CLINIC 257 | Age: 50
Setting detail: DERMATOLOGY
End: 2020-11-04

## 2020-11-04 DIAGNOSIS — L50.1 IDIOPATHIC URTICARIA: ICD-10-CM

## 2020-11-04 DIAGNOSIS — L29.8 OTHER PRURITUS: ICD-10-CM

## 2020-11-04 PROCEDURE — OTHER PRESCRIPTION: OTHER

## 2020-11-04 PROCEDURE — 99213 OFFICE O/P EST LOW 20 MIN: CPT | Mod: 95

## 2020-11-04 PROCEDURE — OTHER COUNSELING: OTHER

## 2020-11-04 RX ORDER — LEVOCETIRIZINE DIHYDROCHLORIDE 5 MG
TABLET ORAL
Qty: 90 | Refills: 3 | Status: ERX

## 2020-11-04 RX ORDER — MONTELUKAST SODIUM 10 MG/1
TABLET, FILM COATED ORAL
Qty: 90 | Refills: 3 | Status: ERX | COMMUNITY
Start: 2020-11-04

## 2020-11-04 ASSESSMENT — LOCATION DETAILED DESCRIPTION DERM
LOCATION DETAILED: PERIUMBILICAL SKIN
LOCATION DETAILED: LEFT MEDIAL SUPERIOR CHEST

## 2020-11-04 ASSESSMENT — LOCATION ZONE DERM: LOCATION ZONE: TRUNK

## 2020-11-04 ASSESSMENT — LOCATION SIMPLE DESCRIPTION DERM
LOCATION SIMPLE: ABDOMEN
LOCATION SIMPLE: CHEST

## 2020-11-04 NOTE — PROCEDURE: COUNSELING
Detail Level: Generalized
Patient Specific Counseling (Will Not Stick From Patient To Patient): The pt has noted horacio breakthrough symptoms about 8pm nightly and Xyzal gives her relief. We will add Singulair into her daily regimen hoping she won’t have breakthrough symptoms.

## 2020-11-17 ENCOUNTER — VIRTUAL VISIT (OUTPATIENT)
Dept: FAMILY MEDICINE | Facility: CLINIC | Age: 50
End: 2020-11-17

## 2020-11-17 DIAGNOSIS — J01.90 ACUTE SINUSITIS WITH COEXISTING CONDITION, NEED PROPHYLACTIC TREATMENT: Primary | ICD-10-CM

## 2020-11-17 PROCEDURE — 99213 OFFICE O/P EST LOW 20 MIN: CPT | Mod: 95 | Performed by: PHYSICIAN ASSISTANT

## 2020-11-17 ASSESSMENT — ENCOUNTER SYMPTOMS
DIARRHEA: 0
SINUS PAIN: 1
SORE THROAT: 1
CHEST TIGHTNESS: 1
FACIAL SWELLING: 0
COUGH: 0
VOMITING: 0
ADENOPATHY: 0
FEVER: 0
DIAPHORESIS: 0
HEADACHES: 1
SHORTNESS OF BREATH: 0
NAUSEA: 0
APPETITE CHANGE: 1
RHINORRHEA: 1
EYE REDNESS: 0
EYE DISCHARGE: 0
CHILLS: 0
SINUS PRESSURE: 1
WHEEZING: 1
FATIGUE: 1

## 2020-11-17 NOTE — PROGRESS NOTES
"Earlene Mark is a 50 year old female who is being evaluated via a billable telephone visit.      The patient has been notified of following:     \"This telephone visit will be conducted via a call between you and your physician/provider. We have found that certain health care needs can be provided without the need for a physical exam.  This service lets us provide the care you need with a short phone conversation.  If a prescription is necessary we can send it directly to your pharmacy.  If lab work is needed we can place an order for that and you can then stop by our lab to have the test done at a later time.    Telephone visits are billed at different rates depending on your insurance coverage. During this emergency period, for some insurers they may be billed the same as an in-person visit.  Please reach out to your insurance provider with any questions.    If during the course of the call the physician/provider feels a telephone visit is not appropriate, you will not be charged for this service.\"    Patient has given verbal consent for Telephone visit?  Yes    What phone number would you like to be contacted at? [phone]    How would you like to obtain your AVS? Leslyhart    Subjective     Earlene Mark is a 50 year old female who presents via phone visit today for the following health issues:    HPI     Acute Illness  Acute illness concerns: sinus issues  Onset/Duration: x 7-8 days  Symptoms:  Fever: no  Chills/Sweats: no  Headache (location?): YES  Sinus Pressure: YES  Conjunctivitis:  no  Ear Pain: YES: bilateral  Rhinorrhea: YES  Congestion: YES  Sore Throat: YES, x 1 day  Cough: no  Wheeze: YES  Decreased Appetite: YES  Nausea: no  Vomiting: no  Diarrhea: no  Dysuria/Freq.: no  Dysuria or Hematuria: no  Fatigue/Achiness: YES fatigue  Sick/Strep Exposure: no  Therapies tried and outcome: nasal rinse, salt water gargles, had old rx amox started taking 2 days ago, took 4 pills, these all have " chalo Henao presents via telephone this morning for evaluation of a presumed sinus infection. She has had maxillary pressure, a sinus headache, nasal congestion, rhinorrhea, a sore throat, a tight chest, fatigue and a decreased appetite for 7 to 8 days. He has not had a fever cough or shortness of breath. No nausea vomiting or diarrhea. She does have a history of sinusitis, and has taken amoxicillin in the past. She had some leftover pills and has taken 2 days worth and feels this has helped her symptoms. She does not work outside the home. She stays home to care for her adult son with special needs.    Review of Systems   Constitutional: Positive for appetite change (decreased) and fatigue. Negative for chills, diaphoresis and fever.   HENT: Positive for congestion, rhinorrhea, sinus pressure, sinus pain and sore throat. Negative for ear pain, facial swelling and postnasal drip.    Eyes: Negative for discharge and redness.   Respiratory: Positive for chest tightness and wheezing. Negative for cough and shortness of breath.    Gastrointestinal: Negative for diarrhea, nausea and vomiting.   Neurological: Positive for headaches.   Hematological: Negative for adenopathy.           Objective          Vitals:  No vitals were obtained today due to virtual visit.    healthy, alert and no distress  PSYCH: Alert and oriented times 3; coherent speech, normal   rate and volume, able to articulate logical thoughts, able   to abstract reason, no tangential thoughts, no hallucinations   or delusions  Her affect is normal and pleasant  RESP: No cough, no audible wheezing, able to talk in full sentences  Remainder of exam unable to be completed due to telephone visits          Assessment/Plan:    Assessment & Plan     Acute sinusitis with coexisting condition, need prophylactic treatment  - amoxicillin-clavulanate (AUGMENTIN) 875-125 MG tablet; Take 1 tablet by mouth 2 times daily for 7 days      We discussed the significant  possibility that this began as a viral infection, very possibly COVID 19. Given that today is Earlene s 8th day of symptoms, testing is more necessary for those around her than for her own benefit. She notes that saliva testing is available through a program for her son and they will come to her house to collect the sample. And her  will look into this. Isolation and quarantine recommendations were discussed.    No follow-ups on file.    Medina Archuleta PA-C  [facility]    Phone call duration:  9 minutes

## 2020-11-17 NOTE — PATIENT INSTRUCTIONS
Augmentin (amoxicillin-clavulanate) 875mg twice daily for 7 days as directed.  Take Tylenol or an NSAID such as ibuprofen or naproxen as needed for pain.  May use netti pot with bottled or distilled water and saline packets to flush sinuses.  Saline drops or nasal sprays may loosen mucus.  Sit in the bathroom with the door closed and hot shower running to loosen mucus.    Isolation is for those who have symptoms or test positive for COVID-19. You should remain home and away from others for 10 days after your symptoms start. You may return to activities after 10 days as long as you have been fever free for 24 hours and have had an improvement in your symptoms. If you are tested and your test comes back negative, you may return to activities 24 hours after you have been fever free without medication.    Quarantine is for those who have had a close contact to someone who is COVID positive. A close contact is defined as being within 6 feet for 15 minutes or longer. We recommend you stay home and away from others for 14 days to monitor for symptoms. If you develop symptoms, enter isolation guidelines and be tested for COVID-19.      Your symptoms show that you may have coronavirus (COVID-19). This illness can cause fever, cough and trouble breathing. Many people get a mild case and get better on their own. Some people can get very sick.    What should I do?  Starting now: Stay home and away from others (self-isolate) until:    You've had no fever--and no medicine that reduces fever--for 3 full days (72 hours). And     Your other symptoms have gotten better. For example, your cough or breathing has improved. And     At least 10 days have passed since your symptoms started.    During this time, don t leave the house except for testing or medical care.    Stay in your own room, even for meals. Use your own bathroom if you can.    Stay away from others in your home. No hugging, kissing or shaking hands. No visitors.    Don t  go to work, school or anywhere else.    Clean  high touch  surfaces often (doorknobs, counters, handles, etc.). Use a household cleaning  spray or wipes. You ll find a full list of  on the EPA website: www.epa.gov/pesticideregistration/  list-n-disinfectants-use-against-sars-cov-2.    Cover your mouth and nose with a mask, tissue or washcloth to avoid spreading germs.    Wash your hands and face often. Use soap and water.    Caregivers in these groups are at risk for severe illness due to COVID-19:  o People 65 years and older  o People who live in a nursing home or long-term care facility  o People with chronic disease (lung, heart, cancer, diabetes, kidney, liver, immunologic)  o People who have a weakened immune system, including those who:    Are in cancer treatment    Take medicine that weakens the immune system, such as corticosteroids    Had a bone marrow or organ transplant    Have an immune deficiency    Have poorly controlled HIV or AIDS    Are obese (body mass index of 40 or higher)    Smoke regularly  o Caregivers should wear gloves while washing dishes, handling laundry and cleaning  bedrooms and bathrooms.  o Use caution when washing and drying laundry: Don t shake dirty laundry, and use the  warmest water setting that you can.  o For more tips, go to www.cdc.gov/coronavirus/2019-ncov/downloads/10Things.pdf.    How can I take care of myself?  1. Get lots of rest. Drink extra fluids (unless a doctor has told you not to).  2. Take Tylenol (acetaminophen) for fever or pain. If you have liver or kidney problems, ask your family  doctor if it's okay to take Tylenol.  Adults can take either:    650 mg (two 325 mg pills) every 4 to 6 hours, or     1,000 mg (two 500 mg pills) every 8 hours as needed.    Note: Don't take more than 3,000 mg in one day. Acetaminophen is found in many medicines  (both prescribed and over-the-counter medicines). Read all labels to be sure you don t take too  much.  For  children, check the Tylenol bottle for the right dose. The dose is based on the child's age or weight.  3. If you have other health problems (like cancer, heart failure, an organ transplant or severe kidney  disease): Call your specialty clinic if you don't feel better in the next 2 days.  4. Know when to call 911. Emergency warning signs include:    Trouble breathing or shortness of breath    Pain or pressure in the chest that doesn t go away    Feeling confused like you haven t felt before, or not being able to wake up    Bluish-colored lips or face    Where can I get more information?    [facility] - About COVID-19: [email or web address]    CDC - What to Do If You re Sick: www.cdc.gov/coronavirus/2019-ncov/about/steps-when-sick.html    CDC - Ending Home Isolation: www.cdc.gov/coronavirus/2019-ncov/hcp/disposition-in-homepatients.  html    CDC - Caring for Someone: www.cdc.gov/coronavirus/2019-ncov/if-you-are-sick/care-for-someone.html    [facility] - Interim Guidance for Hospital Discharge to Home:  www.health.[facility].mn.us/diseases/coronavirus/hcp/hospdischarge.pdf    [facility] clinical trials (COVID-19 research studies): clinicalaffairs.[facility]-clinicaltrials    Below are the COVID-19 hotlines at the [place] Department of Health ([facility]). Interpreters are  available.  o For health questions: Call 135-992-8356 or 1-903.668.5591 (7 a.m. to 7 p.m.)  o For questions about schools and childcare: Call 153-661-2900 or 1-890.153.1963 (7 a.m. to 7  p.m.)

## 2021-01-09 ENCOUNTER — HEALTH MAINTENANCE LETTER (OUTPATIENT)
Age: 51
End: 2021-01-09

## 2021-01-12 ENCOUNTER — MEDICAL CORRESPONDENCE (OUTPATIENT)
Dept: HEALTH INFORMATION MANAGEMENT | Facility: CLINIC | Age: 51
End: 2021-01-12

## 2021-01-12 DIAGNOSIS — E03.9 HYPOACTIVE THYROID: ICD-10-CM

## 2021-01-12 DIAGNOSIS — E55.9 VITAMIN D DEFICIENCY: ICD-10-CM

## 2021-01-12 DIAGNOSIS — F41.9 ANXIETY: ICD-10-CM

## 2021-01-12 DIAGNOSIS — R53.83 FATIGUE: Primary | ICD-10-CM

## 2021-01-12 DIAGNOSIS — G47.09 INITIAL INSOMNIA: ICD-10-CM

## 2021-01-13 DIAGNOSIS — F41.9 ANXIETY: ICD-10-CM

## 2021-01-13 DIAGNOSIS — G47.09 INITIAL INSOMNIA: ICD-10-CM

## 2021-01-13 DIAGNOSIS — E03.9 HYPOACTIVE THYROID: ICD-10-CM

## 2021-01-13 DIAGNOSIS — R53.83 FATIGUE: ICD-10-CM

## 2021-01-13 DIAGNOSIS — E55.9 VITAMIN D DEFICIENCY: ICD-10-CM

## 2021-01-13 LAB
ESTRADIOL SERPL-MCNC: 50 PG/ML
PROGEST SERPL-MCNC: 7.5 NG/ML
T3FREE SERPL-MCNC: 2.3 PG/ML (ref 2.3–4.2)
T4 FREE SERPL-MCNC: 1.03 NG/DL (ref 0.76–1.46)
TSH SERPL DL<=0.005 MIU/L-ACNC: 2.02 MU/L (ref 0.4–4)

## 2021-01-13 PROCEDURE — 36415 COLL VENOUS BLD VENIPUNCTURE: CPT | Performed by: REGISTERED NURSE

## 2021-01-13 PROCEDURE — 82627 DEHYDROEPIANDROSTERONE: CPT | Performed by: REGISTERED NURSE

## 2021-01-13 PROCEDURE — 84144 ASSAY OF PROGESTERONE: CPT | Performed by: REGISTERED NURSE

## 2021-01-13 PROCEDURE — 84481 FREE ASSAY (FT-3): CPT | Performed by: REGISTERED NURSE

## 2021-01-13 PROCEDURE — 82670 ASSAY OF TOTAL ESTRADIOL: CPT | Performed by: REGISTERED NURSE

## 2021-01-13 PROCEDURE — 99000 SPECIMEN HANDLING OFFICE-LAB: CPT | Performed by: REGISTERED NURSE

## 2021-01-13 PROCEDURE — 84443 ASSAY THYROID STIM HORMONE: CPT | Performed by: REGISTERED NURSE

## 2021-01-13 PROCEDURE — 82306 VITAMIN D 25 HYDROXY: CPT | Performed by: REGISTERED NURSE

## 2021-01-13 PROCEDURE — 84403 ASSAY OF TOTAL TESTOSTERONE: CPT | Mod: 90 | Performed by: REGISTERED NURSE

## 2021-01-13 PROCEDURE — 84439 ASSAY OF FREE THYROXINE: CPT | Performed by: REGISTERED NURSE

## 2021-01-14 LAB
DEPRECATED CALCIDIOL+CALCIFEROL SERPL-MC: 123 UG/L (ref 20–75)
DHEA-S SERPL-MCNC: 300 UG/DL (ref 35–430)

## 2021-01-15 LAB — TESTOST SERPL-MCNC: 64 NG/DL (ref 8–60)

## 2021-01-19 ENCOUNTER — TELEPHONE (OUTPATIENT)
Dept: FAMILY MEDICINE | Facility: CLINIC | Age: 51
End: 2021-01-19

## 2021-01-19 DIAGNOSIS — G43.009 MIGRAINE WITHOUT AURA AND WITHOUT STATUS MIGRAINOSUS, NOT INTRACTABLE: ICD-10-CM

## 2021-01-19 NOTE — TELEPHONE ENCOUNTER
Reason for Call:  Other - Prescription    Detailed comments: Pt called to get a refill on her P [id number] med. She uses the mail order pharmacy [facility].    Phone Number Patient can be reached at: Cell number on file:    Telephone Information:   Mobile [phone]       Best Time: Anytime    Can we leave a detailed message on this number? YES    Call taken on 1/19/2021 at 2:46 PM by Shweta Ruvalcaba

## 2021-01-21 ENCOUNTER — APPOINTMENT (OUTPATIENT)
Dept: URBAN - METROPOLITAN AREA CLINIC 257 | Age: 51
Setting detail: DERMATOLOGY
End: 2021-01-21

## 2021-01-21 DIAGNOSIS — L74.51 PRIMARY FOCAL HYPERHIDROSIS: ICD-10-CM

## 2021-01-21 PROBLEM — L74.519 PRIMARY FOCAL HYPERHIDROSIS, UNSPECIFIED: Status: ACTIVE | Noted: 2021-01-21

## 2021-01-21 PROCEDURE — 99213 OFFICE O/P EST LOW 20 MIN: CPT | Mod: 95

## 2021-01-21 PROCEDURE — OTHER COUNSELING: OTHER

## 2021-01-21 NOTE — HPI: SWEATING (HYPERHIDROSIS)
How Severe Is It?: mild
Is This A New Presentation, Or A Follow-Up?: Sweating
Additional History: Patient reports having blue sweat, she notices is on the seat of her car, on her pajamas, and her toilet seat. Patient states she doesn’t notice an increase in sweating or any new odor. The only change she can recall is starting Cingular, which she has since stopped. Patient reports going to urgent care yesterday and being told she “had some yeast”.

## 2021-01-21 NOTE — PROCEDURE: COUNSELING
Medical Necessity Information: LCD Guidelines vary from payer to payer. Please check with your payer's policy to determine medical necessity.
Detail Level: Zone
Patient Specific Counseling (Will Not Stick From Patient To Patient): Patient reports recent hormonal changes and feeling a lot of stress recently which she believes may be contributing to her sweating more. Prior to a month ago she has never noted blue sweating. Patient recently stopped taking an antibiotic for a UTI and will discontinue taking her multivitamin. Patient was advised to schedule a punch biopsy to rule out Possible chromhidrosis at her convenience. She was given reassurance.

## 2021-02-08 ENCOUNTER — TELEPHONE (OUTPATIENT)
Dept: FAMILY MEDICINE | Facility: CLINIC | Age: 51
End: 2021-02-08

## 2021-02-08 DIAGNOSIS — G43.009 MIGRAINE WITHOUT AURA AND WITHOUT STATUS MIGRAINOSUS, NOT INTRACTABLE: ICD-10-CM

## 2021-02-08 RX ORDER — RIZATRIPTAN BENZOATE 10 MG/1
TABLET ORAL
Qty: 54 TABLET | Refills: 3 | Status: CANCELLED | OUTPATIENT
Start: 2021-02-08

## 2021-02-08 NOTE — TELEPHONE ENCOUNTER
"[facility] from [place], [place], Is calling as pt is asking for a refill on her Maxalt 10 mg. That she takes for her Migraine headaches. \" We faxed a refill request back on 2/4/21. We haven't heard any thing yet. \"   RN checked our refill and there isn't anything in for Earlene Mark. Will forward refill to Dr. Linda. Will close this telephone call and get the refill going when open a refill encounter.........................WILLIAMS Bradley      "

## 2021-02-08 NOTE — TELEPHONE ENCOUNTER
Maxalt 10 mg  Last Written Prescription Date:  7/6/20  Last Fill Quantity: 54,  # refills: 3   Last office visit:  Virtual Visit  found with prescribing provider:  6/8/20  Dr. Linda  Last F2F encounter 4/20/18 with Dr. Linda  Future Office Visit:  None  Requested Prescriptions   Pending Prescriptions Disp Refills     rizatriptan (MAXALT) 10 MG tablet 54 tablet 3     Sig: Take 1/2 to 1 tablet by mouth at onset of migraine, may repeat in 2 hours -Max 3 tablet(s) in 24 hours       There is no refill protocol information for this order      Routing refill request to provider for review/approval because:  Needs MD approve as using more than 8 per month  WILLIAMS Bradley

## 2021-02-09 RX ORDER — RIZATRIPTAN BENZOATE 10 MG/1
TABLET ORAL
Qty: 54 TABLET | Refills: 3 | Status: SHIPPED | OUTPATIENT
Start: 2021-02-09 | End: 2021-06-20

## 2021-03-10 ENCOUNTER — OFFICE VISIT (OUTPATIENT)
Dept: ALLERGY | Facility: OTHER | Age: 51
End: 2021-03-10

## 2021-03-10 VITALS
SYSTOLIC BLOOD PRESSURE: 142 MMHG | BODY MASS INDEX: 22.2 KG/M2 | HEIGHT: 64 IN | OXYGEN SATURATION: 100 % | WEIGHT: 130 LBS | HEART RATE: 71 BPM | DIASTOLIC BLOOD PRESSURE: 72 MMHG

## 2021-03-10 DIAGNOSIS — L50.1 CHRONIC IDIOPATHIC URTICARIA: ICD-10-CM

## 2021-03-10 DIAGNOSIS — R09.82 POST-NASAL DRIP: ICD-10-CM

## 2021-03-10 DIAGNOSIS — R06.2 WHEEZING: ICD-10-CM

## 2021-03-10 PROCEDURE — 36415 COLL VENOUS BLD VENIPUNCTURE: CPT | Performed by: ALLERGY & IMMUNOLOGY

## 2021-03-10 PROCEDURE — 99204 OFFICE O/P NEW MOD 45 MIN: CPT | Performed by: ALLERGY & IMMUNOLOGY

## 2021-03-10 PROCEDURE — 86003 ALLG SPEC IGE CRUDE XTRC EA: CPT | Performed by: ALLERGY & IMMUNOLOGY

## 2021-03-10 RX ORDER — LEVOCETIRIZINE DIHYDROCHLORIDE 5 MG/1
5 TABLET, FILM COATED ORAL 2 TIMES DAILY
Qty: 60 TABLET | Refills: 11 | Status: SHIPPED | OUTPATIENT
Start: 2021-03-10 | End: 2022-04-14

## 2021-03-10 RX ORDER — FLUTICASONE PROPIONATE 110 UG/1
1 AEROSOL, METERED RESPIRATORY (INHALATION) 2 TIMES DAILY
Qty: 12 G | Refills: 3 | Status: SHIPPED | OUTPATIENT
Start: 2021-03-10

## 2021-03-10 ASSESSMENT — MIFFLIN-ST. JEOR: SCORE: 1194.68

## 2021-03-10 NOTE — NURSING NOTE
Writer demonstrated how to use an HFA inhaler with a spacer for patient.  Patient instructed to shake the inhaler, empty lungs, put the inhaler spacer in mouth, push down on the inhaler and breathe in at the same time and then hold breath for 10 seconds.  RN informed patient that if an audible whistle/hum is heard from spacer, they are to slow their breathing.  Patient advised to wait 30 seconds-1 minute between puffs.  Patient instructed on how to clean the MDI inhaler, take the medication canister out, wash it in warm soapy water, rinse it and then let it dry over night.  RN advised pt to refer to handout with spacer for cleaning instructions.  Patient informed the inhaler needs to be washed once a week or when he notices a powder buildup.  Patient verbalized understanding.     Writer demonstrated how to use a breath activated inhaler for patient.  Patient instructed to make sure cap is closed before use.  The inhaler does not need to be shaken before use.  Open the cap of the inhaler all the way until you hear a click, and see the dose counter go down by one.  Breathe out as much air as you can from your lungs.  Close your lips tightly around mouthpiece, making sure the vents are not covered by lips or fingers.  Take in a steady, long deep breath through your mouth, and hold breath for 10 seconds.  Close the cap of inhaler.  Repeat steps above if you need another dose.  Keep inhaler clean and dry.  If mouthpiece needs cleaning, it can be cleaned with dry cloth or tissue.  Patient verbalized understanding.    Soraya Ochoa RN

## 2021-03-10 NOTE — LETTER
3/10/2021         RE: Earlene Mark  [address]  [facility] [address]        Dear Colleague,    Thank you for referring your patient, Earlene Mark, to the [facility]. Please see a copy of my visit note below.    Earlene Mark is a 50 year old White female with previous medical history significant for chronic hives, asthma. Earlene Mark is being seen today for evaluation of asthma and chronic hives.       Patient reports that over the course the last 5 years she has had daily erythematous, raised, pruritic welts.  This could occur anywhere on her body.  Treated with Xyzal 5 mg daily.  If she misses she will have breakthrough hives.  No angioedema.  No clear triggers of her symptoms.  Normal TSH.  Elevated vitamin D.  No scarring or discoloration of rash.    Patient reports daily wheezing, coughing and tightness in chest.  Having symptoms in the morning and evening.  Using albuterol twice a day.  Albuterol is beneficial.  Potential cough and burning in chest with physical activity.  Potential symptoms with cold air.  The symptoms have been present for the last 1.5 years.  No history of complete pulmonary function studies.  She has been on Singulair in the past and not beneficial for chest symptoms.  She additionally takes Flonase.  Started with sinusitis but has remained on it.  Denies any consistent nasal or ocular symptoms aside from very mild postnasal drainage.  No clear seasonality to her symptoms.    ENVIRONMENTAL HISTORY: [place] is heated with a forced air and gas furnace. They do have central air conditioning. The patient's bedroom is furnished with carpeting in bedroom.  Pets inside the house include 1 dog(s). There is no history of cockroach or mice infestation. There is/are 0 smokers in the house.  The house does not have a damp basement.         Past Medical History:   Diagnosis Date     Abnormal Papanicolaou  smear of cervix and cervical HPV     none since LEEP     Allergic rhinitis, cause unspecified     ? 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     DEPRESSIVE DISORDER NEC 2003     Headache(784.0) 10/21/92     Menorrhagia 2009     MENSTRUAL MIGRAINES 2007     s/p Hysterectomy, cervical pathology benign, no need for further pap smears 1/3/2013     Family History   Problem Relation Age of Onset     Neurologic Disorder Mother         migraines     Lipids Mother      Arthritis Mother         rheumatoid     Chronic Obstructive Pulmonary Disease Mother         smoker     Hypertension Father      Lipids Father      Blood Disease Father 51        DVT  at age 51 from probable PE     Neurologic Disorder Father         Muscular Dystrophy     Muscular Dystrophy Brother 19     Diabetes Paternal Grandmother      Diabetes Paternal Grandfather      Cancer Paternal Grandfather         colon     Neurologic Disorder Brother         Muscular Dystrophy  at age 19 from an MI     Cancer Paternal Uncle         colon     Asthma Son      Congenital Anomalies Son         epilepsy hydrocephilis     Past Surgical History:   Procedure Laterality Date     C  DELIVERY ONLY      , Low Cervical     COLPOSCOPY,LOOP ELECTRD CERVIX EXCIS       HC CONIZATION CERVIX,KNIFE/LASER  08    FILEMON 3     HC REMOVAL OF TONSILS,12+ Y/O       HYSTERECTOMY, VAGINAL  02/02/10    laparoscopic assisted vaginal.  benign cervix     TEST NOT FOUND  2001    Abdominoplasty (tummy tuck)       REVIEW OF SYSTEMS:  General: negative for weight gain. negative for weight loss. negative for changes in sleep.   Ears: negative for fullness. negative for hearing loss. negative for dizziness.   Nose: negative for snoring.negative for changes in smell. negative for drainage.   Eyes: negative for eye watering. negative for eye itching. negative for vision  changes. negative for eye redness.  Throat: negative for hoarseness. negative for sore throat. negative for trouble swallowing.   Lungs: negative for shortness of breath.positive  for wheezing. negative for sputum production.   Cardiovascular: negative for chest pain. negative for swelling of ankles. negative for fast or irregular heartbeat.   Gastrointestinal: negative for nausea. negative for heartburn. negative for acid reflux.   Musculoskeletal: negative for joint pain. negative for joint stiffness. negative for joint swelling.   Neurologic: negative for seizures. negative for fainting. negative for weakness.   Psychiatric: negative for changes in mood. negative for anxiety.   Endocrine: negative for cold intolerance. negative for heat intolerance. negative for tremors.   Lymphatic: negative for lower extremity swelling. negative for lymph node swelling.   Hematologic: negative for easy bruising. negative for easy bleeding.  Integumentary: positive  for rash. negative for scaling. negative for nail changes.       Current Outpatient Medications:      albuterol (2.5 MG/3ML) 0.083% neb solution, Take 1 vial (2.5 mg) by nebulization every 4 hours as needed for wheezing Hold on file. Will call if needed, Disp: 30 vial, Rfl: 0     albuterol (PROAIR HFA/PROVENTIL HFA/VENTOLIN HFA) 108 (90 Base) MCG/ACT inhaler, Inhale 2 puffs into the lungs every 4 hours as needed for shortness of breath / dyspnea or wheezing, Disp: 1 Inhaler, Rfl: 0     co-enzyme Q-10 100 MG CAPS capsule, Take 100 mg by mouth daily, Disp: , Rfl:      DHEA 10 MG TABS, Take 1 tablet by mouth daily, Disp: , Rfl:      fluticasone (FLOVENT HFA) 110 MCG/ACT inhaler, Inhale 1 puff into the lungs 2 times daily, Disp: 12 g, Rfl: 3     levocetirizine (XYZAL) 5 MG tablet, Take 1 tablet (5 mg) by mouth 2 times daily, Disp: 60 tablet, Rfl: 11     MULTIPLE VITAMIN OR TABS, 1 TABLET ORALLY DAILY, Disp: , Rfl: 0     Omega-3 Fatty Acids (OMEGA 3 PO), Take 1,250 mg by  mouth 2 times daily, Disp: , Rfl:      PROGESTERONE MICRONIZED PO, , Disp: , Rfl:      rizatriptan (MAXALT) 10 MG tablet, Take 1/2 to 1 tablet by mouth at onset of migraine, may repeat in 2 hours -Max 3 tablet(s) in 24 hours, Disp: 54 tablet, Rfl: 3     Sodium Chloride-Sodium Bicarb (AYR SALINE NASAL RINSE NA), , Disp: , Rfl:      TESTOSTERONE 2MG, , Disp: , Rfl:      traZODone (DESYREL) 50 MG tablet, Take 1-2 tablets ( mg) by mouth At Bedtime, Disp: 180 tablet, Rfl: 6     Zinc 30 MG TABS, Take 2 tablets by mouth At Bedtime, Disp: , Rfl:      Cyanocobalamin (VITAMIN B-12) 5000 MCG TBDP, Take 1 tablet by mouth daily, Disp: , Rfl:   Immunization History   Administered Date(s) Administered     HEPA 01/08/2007, 07/24/2007     HepA-Adult 07/24/2007     HepA-ped 2 Dose 01/08/2007     Influenza (IIV3) PF 10/16/1997, 10/20/1998, 10/04/1999, 11/08/2000, 11/16/2001, 10/04/2011     MMR 11/20/1996     TD (ADULT, 7+) 11/20/1996, 01/08/2007     TDAP Vaccine (Adacel) 04/20/2018     Allergies   Allergen Reactions     Morphine      Prochlorperazine      compazine -dystonic reaction     Seasonal Allergies          EXAM:   Constitutional:  Appears well-developed and well-nourished. No distress.   HEENT:   Head: Normocephalic.   No cobblestoning of posterior oropharynx.   Nasal tissue pink and normal appearing.  No rhinorrhea noted.    Eyes: Conjunctivae are non-erythematous   Cardiovascular: Normal rate, regular rhythm and normal heart sounds. Exam reveals no gallop and no friction rub.   No murmur heard.  Respiratory: Effort normal and breath sounds normal. No respiratory distress. No wheezes. No rales.   Musculoskeletal: Normal range of motion.   Neuro: Oriented to person, place, and time.  Skin: Skin is warm and dry. No rash noted.   Psychiatric: Normal mood and affect.     Nursing note and vitals reviewed.    ASSESSMENT/PLAN:  Problem List Items Addressed This Visit        Respiratory    Wheezing     Wheezing multiple times  per day.  Albuterol is used and beneficial.  Symptoms over the last 1.5 years.  Nonseasonal.    -Allergy testing as noted.  -Either start Flovent 110 mcg 1 puff inhaled twice daily or Arnuity 100 mcg 1 puff inhaled daily depending on expense.  -Albuterol 2-4 puffs inhaled (use a spacer unless using a Proair Respiclick device) every 4 hours as needed for chest tightness, wheezing, shortness of breath and/or coughing.   -Complete pulmonary function studies.           Relevant Medications    levocetirizine (XYZAL) 5 MG tablet    fluticasone (FLOVENT HFA) 110 MCG/ACT inhaler    Other Relevant Orders    Allergen cat epithellium IgE (Completed)    Allergen dog epithelium IgE (Completed)    Allergen guillermina IgE (Completed)    Allergen D pteronyssinus IgE (Completed)    Allergen D farinae IgE (Completed)    Allergen alternaria alternata IgE (Completed)    Allergen aspergillus fumigatus IgE (Completed)    Allergen Epicoccum purpurascens IgE (Completed)    Allergen penicillium notatum IgE (Completed)    Allergen cladosporium herbarum IgE (Completed)    Allergen ursula white IgE (Completed)    Allergen [place] IgE (Completed)    Allergen cottonwood IgE (Completed)    Allergen elm IgE (Completed)    Allergen maple box elder IgE (Completed)    Allergen oak white IgE (Completed)    Allergen Red [place] IgE (Completed)    Allergen silver  birch IgE (Completed)    Allergen Tree White [place] IgE (Completed)    Allergen giant ragweed IgE (Completed)    Allergen lamb's quarter IgE (Completed)    Allergen Mugwort IgE (Completed)    Allergen ragweed short IgE (Completed)    Allergen Sheep Sorrel IgE (Completed)    Allergen English plantain IgE (Completed)    Allergen thistle [place] IgE (Completed)    Allergen Weed Nettle IgE (Completed)    Allergen, Kochia/Firebush (Completed)    Pulmonary Function Test       Musculoskeletal and Integumentary    Chronic idiopathic urticaria     Chronic hives over the last 5 years.  No scarring or  discoloration.  Occurred anywhere on her body.  Erythematous, raised and pruritic.  Xyzal helpful.  However, through the end of 24 hours hives will return.    -Increase Xyzal to 5 mg by mouth twice daily.  Discussed with patient that external cause of urticaria is found in under 1% of patients that have chronic urticaria.  Thought to be autoimmune in most cases.  Treatment includes antihistamines.  Symptoms may persist for months to years.  Since she has responded nicely to antihistamines we will continue but use twice daily to give her more comprehensive antihistamine coverage.         Relevant Medications    levocetirizine (XYZAL) 5 MG tablet    fluticasone (FLOVENT HFA) 110 MCG/ACT inhaler    Other Relevant Orders    Urticaria Induced Basophil Activation (Completed)       Other    Post-nasal drip    Relevant Medications    levocetirizine (XYZAL) 5 MG tablet    fluticasone (FLOVENT HFA) 110 MCG/ACT inhaler    Other Relevant Orders    Allergen cat epithellium IgE (Completed)    Allergen dog epithelium IgE (Completed)    Allergen guillermina IgE (Completed)    Allergen D pteronyssinus IgE (Completed)    Allergen D farinae IgE (Completed)    Allergen alternaria alternata IgE (Completed)    Allergen aspergillus fumigatus IgE (Completed)    Allergen Epicoccum purpurascens IgE (Completed)    Allergen penicillium notatum IgE (Completed)    Allergen cladosporium herbarum IgE (Completed)    Allergen ursula white IgE (Completed)    Allergen [place] IgE (Completed)    Allergen cottonwood IgE (Completed)    Allergen elm IgE (Completed)    Allergen maple box elder IgE (Completed)    Allergen oak white IgE (Completed)    Allergen Red [place] IgE (Completed)    Allergen silver  birch IgE (Completed)    Allergen Tree White [place] IgE (Completed)    Allergen giant ragweed IgE (Completed)    Allergen lamb's quarter IgE (Completed)    Allergen Mugwort IgE (Completed)    Allergen ragweed short IgE (Completed)    Allergen Sheep [place] IgE  (Completed)    Allergen English plantain IgE (Completed)    Allergen thistle [place] IgE (Completed)    Allergen Weed Nettle IgE (Completed)    Allergen, Kochia/Firebush (Completed)          Chart documentation with Dragon Voice recognition Software. Although reviewed after completion, some words and grammatical errors may remain.    Kwame Borrego DO FAAAAI  Medical Director for Allergy/Immunology at [place], [place]        Again, thank you for allowing me to participate in the care of your patient.        Sincerely,        Kwame Borrego DO

## 2021-03-10 NOTE — PROGRESS NOTES
Earlene Mark is a 50 year old White female with previous medical history significant for chronic hives, asthma. Earlene Mark is being seen today for evaluation of asthma and chronic hives.       Patient reports that over the course the last 5 years she has had daily erythematous, raised, pruritic welts.  This could occur anywhere on her body.  Treated with Xyzal 5 mg daily.  If she misses she will have breakthrough hives.  No angioedema.  No clear triggers of her symptoms.  Normal TSH.  Elevated vitamin D.  No scarring or discoloration of rash.    Patient reports daily wheezing, coughing and tightness in chest.  Having symptoms in the morning and evening.  Using albuterol twice a day.  Albuterol is beneficial.  Potential cough and burning in chest with physical activity.  Potential symptoms with cold air.  The symptoms have been present for the last 1.5 years.  No history of complete pulmonary function studies.  She has been on Singulair in the past and not beneficial for chest symptoms.  She additionally takes Flonase.  Started with sinusitis but has remained on it.  Denies any consistent nasal or ocular symptoms aside from very mild postnasal drainage.  No clear seasonality to her symptoms.    ENVIRONMENTAL HISTORY: [place] The [place] is heated with a forced air and gas furnace. They do have central air conditioning. The patient's bedroom is furnished with carpeting in bedroom.  Pets inside the house include 1 dog(s). There is no history of cockroach or mice infestation. There is/are 0 smokers in the house.  The house does not have a damp basement.         Past Medical History:   Diagnosis Date     Abnormal Papanicolaou smear of cervix and cervical HPV 1990    none since LEEP     Allergic rhinitis, cause unspecified     ? 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 12/18/2009     DEPRESSIVE DISORDER NEC 6/6/2003      Headache(784.0) 10/21/92     Menorrhagia 2009     MENSTRUAL MIGRAINES 2007     s/p Hysterectomy, cervical pathology benign, no need for further pap smears 1/3/2013     Family History   Problem Relation Age of Onset     Neurologic Disorder Mother         migraines     Lipids Mother      Arthritis Mother         rheumatoid     Chronic Obstructive Pulmonary Disease Mother         smoker     Hypertension Father      Lipids Father      Blood Disease Father 51        DVT  at age 51 from probable PE     Neurologic Disorder Father         Muscular Dystrophy     Muscular Dystrophy Brother 19     Diabetes Paternal Grandmother      Diabetes Paternal Grandfather      Cancer Paternal Grandfather         colon     Neurologic Disorder Brother         Muscular Dystrophy  at age 19 from an MI     Cancer Paternal Uncle         colon     Asthma Son      Congenital Anomalies Son         epilepsy hydrocephilis     Past Surgical History:   Procedure Laterality Date     C  DELIVERY ONLY      , Low Cervical     COLPOSCOPY,LOOP ELECTRD CERVIX EXCIS       HC CONIZATION CERVIX,KNIFE/LASER  08    FILEMON 3     HC REMOVAL OF TONSILS,12+ Y/O       HYSTERECTOMY, VAGINAL  02/02/10    laparoscopic assisted vaginal.  benign cervix     TEST NOT FOUND  2001    Abdominoplasty (tummy tuck)       REVIEW OF SYSTEMS:  General: negative for weight gain. negative for weight loss. negative for changes in sleep.   Ears: negative for fullness. negative for hearing loss. negative for dizziness.   Nose: negative for snoring.negative for changes in smell. negative for drainage.   Eyes: negative for eye watering. negative for eye itching. negative for vision changes. negative for eye redness.  Throat: negative for hoarseness. negative for sore throat. negative for trouble swallowing.   Lungs: negative for shortness of breath.positive  for wheezing. negative for sputum production.   Cardiovascular: negative  for chest pain. negative for swelling of ankles. negative for fast or irregular heartbeat.   Gastrointestinal: negative for nausea. negative for heartburn. negative for acid reflux.   Musculoskeletal: negative for joint pain. negative for joint stiffness. negative for joint swelling.   Neurologic: negative for seizures. negative for fainting. negative for weakness.   Psychiatric: negative for changes in mood. negative for anxiety.   Endocrine: negative for cold intolerance. negative for heat intolerance. negative for tremors.   Lymphatic: negative for lower extremity swelling. negative for lymph node swelling.   Hematologic: negative for easy bruising. negative for easy bleeding.  Integumentary: positive  for rash. negative for scaling. negative for nail changes.       Current Outpatient Medications:      albuterol (2.5 MG/3ML) 0.083% neb solution, Take 1 vial (2.5 mg) by nebulization every 4 hours as needed for wheezing Hold on file. Will call if needed, Disp: 30 vial, Rfl: 0     albuterol (PROAIR HFA/PROVENTIL HFA/VENTOLIN HFA) 108 (90 Base) MCG/ACT inhaler, Inhale 2 puffs into the lungs every 4 hours as needed for shortness of breath / dyspnea or wheezing, Disp: 1 Inhaler, Rfl: 0     co-enzyme Q-10 100 MG CAPS capsule, Take 100 mg by mouth daily, Disp: , Rfl:      DHEA 10 MG TABS, Take 1 tablet by mouth daily, Disp: , Rfl:      fluticasone (FLOVENT HFA) 110 MCG/ACT inhaler, Inhale 1 puff into the lungs 2 times daily, Disp: 12 g, Rfl: 3     levocetirizine (XYZAL) 5 MG tablet, Take 1 tablet (5 mg) by mouth 2 times daily, Disp: 60 tablet, Rfl: 11     MULTIPLE VITAMIN OR TABS, 1 TABLET ORALLY DAILY, Disp: , Rfl: 0     Omega-3 Fatty Acids (OMEGA 3 PO), Take 1,250 mg by mouth 2 times daily, Disp: , Rfl:      PROGESTERONE MICRONIZED PO, , Disp: , Rfl:      rizatriptan (MAXALT) 10 MG tablet, Take 1/2 to 1 tablet by mouth at onset of migraine, may repeat in 2 hours -Max 3 tablet(s) in 24 hours, Disp: 54 tablet, Rfl: 3      Sodium Chloride-Sodium Bicarb (AYR SALINE NASAL RINSE NA), , Disp: , Rfl:      TESTOSTERONE 2MG, , Disp: , Rfl:      traZODone (DESYREL) 50 MG tablet, Take 1-2 tablets ( mg) by mouth At Bedtime, Disp: 180 tablet, Rfl: 6     Zinc 30 MG TABS, Take 2 tablets by mouth At Bedtime, Disp: , Rfl:      Cyanocobalamin (VITAMIN B-12) 5000 MCG TBDP, Take 1 tablet by mouth daily, Disp: , Rfl:   Immunization History   Administered Date(s) Administered     HEPA 01/08/2007, 07/24/2007     HepA-Adult 07/24/2007     HepA-ped 2 Dose 01/08/2007     Influenza (IIV3) PF 10/16/1997, 10/20/1998, 10/04/1999, 11/08/2000, 11/16/2001, 10/04/2011     MMR 11/20/1996     TD (ADULT, 7+) 11/20/1996, 01/08/2007     TDAP Vaccine (Adacel) 04/20/2018     Allergies   Allergen Reactions     Morphine      Prochlorperazine      compazine -dystonic reaction     Seasonal Allergies          EXAM:   Constitutional:  Appears well-developed and well-nourished. No distress.   HEENT:   Head: Normocephalic.   No cobblestoning of posterior oropharynx.   Nasal tissue pink and normal appearing.  No rhinorrhea noted.    Eyes: Conjunctivae are non-erythematous   Cardiovascular: Normal rate, regular rhythm and normal heart sounds. Exam reveals no gallop and no friction rub.   No murmur heard.  Respiratory: Effort normal and breath sounds normal. No respiratory distress. No wheezes. No rales.   Musculoskeletal: Normal range of motion.   Neuro: Oriented to person, place, and time.  Skin: Skin is warm and dry. No rash noted.   Psychiatric: Normal mood and affect.     Nursing note and vitals reviewed.    ASSESSMENT/PLAN:  Problem List Items Addressed This Visit        Respiratory    Wheezing     Wheezing multiple times per day.  Albuterol is used and beneficial.  Symptoms over the last 1.5 years.  Nonseasonal.    -Allergy testing as noted.  -Either start Flovent 110 mcg 1 puff inhaled twice daily or Arnuity 100 mcg 1 puff inhaled daily depending on expense.  -Albuterol  2-4 puffs inhaled (use a spacer unless using a Proair Respiclick device) every 4 hours as needed for chest tightness, wheezing, shortness of breath and/or coughing.   -Complete pulmonary function studies.           Relevant Medications    levocetirizine (XYZAL) 5 MG tablet    fluticasone (FLOVENT HFA) 110 MCG/ACT inhaler    Other Relevant Orders    Allergen cat epithellium IgE (Completed)    Allergen dog epithelium IgE (Completed)    Allergen guillermina IgE (Completed)    Allergen D pteronyssinus IgE (Completed)    Allergen D farinae IgE (Completed)    Allergen alternaria alternata IgE (Completed)    Allergen aspergillus fumigatus IgE (Completed)    Allergen Epicoccum purpurascens IgE (Completed)    Allergen penicillium notatum IgE (Completed)    Allergen cladosporium herbarum IgE (Completed)    Allergen ursula white IgE (Completed)    Allergen [place] IgE (Completed)    Allergen cottonwood IgE (Completed)    Allergen elm IgE (Completed)    Allergen maple box elder IgE (Completed)    Allergen oak white IgE (Completed)    Allergen Red [place] IgE (Completed)    Allergen silver  birch IgE (Completed)    Allergen Tree White [place] IgE (Completed)    Allergen giant ragweed IgE (Completed)    Allergen lamb's quarter IgE (Completed)    Allergen Mugwort IgE (Completed)    Allergen ragweed short IgE (Completed)    Allergen Sheep Sorrel IgE (Completed)    Allergen English plantain IgE (Completed)    Allergen thistle Russian IgE (Completed)    Allergen Weed Nettle IgE (Completed)    Allergen, Kochia/Firebush (Completed)    Pulmonary Function Test       Musculoskeletal and Integumentary    Chronic idiopathic urticaria     Chronic hives over the last 5 years.  No scarring or discoloration.  Occurred anywhere on her body.  Erythematous, raised and pruritic.  Xyzal helpful.  However, through the end of 24 hours hives will return.    -Increase Xyzal to 5 mg by mouth twice daily.  Discussed with patient that external cause of urticaria  is found in under 1% of patients that have chronic urticaria.  Thought to be autoimmune in most cases.  Treatment includes antihistamines.  Symptoms may persist for months to years.  Since she has responded nicely to antihistamines we will continue but use twice daily to give her more comprehensive antihistamine coverage.         Relevant Medications    levocetirizine (XYZAL) 5 MG tablet    fluticasone (FLOVENT HFA) 110 MCG/ACT inhaler    Other Relevant Orders    Urticaria Induced Basophil Activation (Completed)       Other    Post-nasal drip    Relevant Medications    levocetirizine (XYZAL) 5 MG tablet    fluticasone (FLOVENT HFA) 110 MCG/ACT inhaler    Other Relevant Orders    Allergen cat epithellium IgE (Completed)    Allergen dog epithelium IgE (Completed)    Allergen guillermina IgE (Completed)    Allergen D pteronyssinus IgE (Completed)    Allergen D farinae IgE (Completed)    Allergen alternaria alternata IgE (Completed)    Allergen aspergillus fumigatus IgE (Completed)    Allergen Epicoccum purpurascens IgE (Completed)    Allergen penicillium notatum IgE (Completed)    Allergen cladosporium herbarum IgE (Completed)    Allergen ursula white IgE (Completed)    Allergen [place] IgE (Completed)    Allergen cottonwood IgE (Completed)    Allergen elm IgE (Completed)    Allergen maple box elder IgE (Completed)    Allergen oak white IgE (Completed)    Allergen Red [place] IgE (Completed)    Allergen silver  birch IgE (Completed)    Allergen Tree [place] IgE (Completed)    Allergen giant ragweed IgE (Completed)    Allergen lamb's quarter IgE (Completed)    Allergen Mugwort IgE (Completed)    Allergen ragweed short IgE (Completed)    Allergen Sheep Sorrel IgE (Completed)    Allergen English plantain IgE (Completed)    Allergen thistle Russian IgE (Completed)    Allergen Weed Nettle IgE (Completed)    Allergen, Kochia/Firebush (Completed)          Chart documentation with Dragon Voice recognition Software. Although  reviewed after completion, some words and grammatical errors may remain.    Kwame Borrego DO FAAAAI  Medical Director for Allergy/Immunology at [place], [place]

## 2021-03-10 NOTE — ASSESSMENT & PLAN NOTE
Wheezing multiple times per day.  Albuterol is used and beneficial.  Symptoms over the last 1.5 years.  Nonseasonal.    -Allergy testing as noted.  -Either start Flovent 110 mcg 1 puff inhaled twice daily or Arnuity 100 mcg 1 puff inhaled daily depending on expense.  -Albuterol 2-4 puffs inhaled (use a spacer unless using a Proair Respiclick device) every 4 hours as needed for chest tightness, wheezing, shortness of breath and/or coughing.   -Complete pulmonary function studies.

## 2021-03-10 NOTE — PATIENT INSTRUCTIONS
Allergy Staff Appt Hours Shot Hours Locations    Physician     Kwame Borrego DO       Support Staff     WILLIAMS Jolley CMA  Tuesday:        [place] 7-5 Wednesday:        [place]: 7-5 Thursday:                    [place] 7-6     Friday:  [place]  7-2   [place]        Thursday: 8-5:20        Friday: 7-12     [place]        Tuesday: 7- 3:20 Wednesday: 7-4:20 [place] Monday: 7-2:20 Tuesday: 9-5:20         [facility]  [address] [place] [address]  Appt Line: [phone]  Allergy RN:  [phone]    [facility]  [address] [place] [address]  Appt Line: [phone]  Allergy RN:  [phone]       Important Scheduling Information  Aspirin Desensitization: Appt will last 2 clinic days. Please call the Allergy RN line for your clinic to schedule. Discontinue antihistamines 7 days prior to the appointment.     Food Challenges: Appt will last 3-4 hours. Please call the Allergy RN line for your clinic to schedule. Discontinue antihistamines 7 days prior to the appointment.     Penicillin Testing: Appt will last 2-3 hours. Please call the Allergy RN line for your clinic to schedule. Discontinue antihistamines 7 days prior to the appointment.     Skin Testing: Appt will about 40 minutes. Call the appointment line for your clinic to schedule. Discontinue antihistamines 7 days prior to the appointment.     Venom Testing: Appt will last 2-3 hours. Please call the Allergy RN line for your clinic to schedule. Discontinue antihistamines 7 days prior to the appointment.     Thank you for trusting us with your Allergy, Asthma, and Immunology care. Please feel free to contact us with any questions or concerns you may have.      - Arnuity 100 mcg 1 puff inhaled daily or Flovent 110mcg 1 puff twice daily.   - Albuterol 2-4 puffs inhaled (use a spacer unless using a Proair Respiclick device) every 4 hours as needed for chest tightness, wheezing, shortness  of breath and/or coughing.   - Stop Flonase.   - Increase Xyzal to 5mg by mouth twice daily.   - Allergy blood testing today.   - Referral for complete pulmonary function studies.

## 2021-03-10 NOTE — ASSESSMENT & PLAN NOTE
Chronic hives over the last 5 years.  No scarring or discoloration.  Occurred anywhere on her body.  Erythematous, raised and pruritic.  Xyzal helpful.  However, through the end of 24 hours hives will return.    -Increase Xyzal to 5 mg by mouth twice daily.  Discussed with patient that external cause of urticaria is found in under 1% of patients that have chronic urticaria.  Thought to be autoimmune in most cases.  Treatment includes antihistamines.  Symptoms may persist for months to years.  Since she has responded nicely to antihistamines we will continue but use twice daily to give her more comprehensive antihistamine coverage.

## 2021-03-11 LAB
A ALTERNATA IGE QN: <0.1 KU(A)/L
C HERBARUM IGE QN: <0.1 KU(A)/L
COMMON RAGWEED IGE QN: <0.1 KU(A)/L
D FARINAE IGE QN: <0.1 KU(A)/L
E PURPURASCENS IGE QN: <0.1 KU(A)/L
GIANT RAGWEED IGE QN: <0.1 KU(A)/L
GOOSEFOOT IGE QN: <0.1 KU(A)/L
MUGWORT IGE QN: <0.1 KU(A)/L
RED MULBERRY IGE QN: <0.1 KU(A)/L
SHEEP SORREL IGE QN: <0.1 KU(A)/L
SILVER BIRCH IGE QN: <0.1 KU(A)/L
TIMOTHY IGE QN: <0.1 KU(A)/L
WHITE MULBERRY IGE QN: <0.1 KU(A)/L

## 2021-03-12 LAB
A FUMIGATUS IGE QN: <0.1 KU(A)/L
CAT DANDER IGG QN: <0.1 KU(A)/L
CEDAR IGE QN: <0.1 KU(A)/L
COTTONWOOD IGE QN: <0.1 KU(A)/L
D PTERONYSS IGE QN: <0.1 KU(A)/L
DOG DANDER+EPITH IGE QN: <0.1 KU(A)/L
ENGL PLANTAIN IGE QN: <0.1 KU(A)/L
FIREBUSH IGE QN: <0.1 KU(A)/L
NETTLE IGE QN: <0.1 KU(A)/L
P NOTATUM IGE QN: <0.1 KU(A)/L
SALTWORT IGE QN: <0.1 KU(A)/L
WHITE ASH IGE QN: <0.1 KU(A)/L
WHITE ELM IGE QN: <0.1 KU(A)/L
WHITE OAK IGE QN: <0.1 KU(A)/L

## 2021-03-15 LAB — MAPLE IGE QN: <0.1 KU(A)/L

## 2021-03-17 LAB — URTICARIA INDUCED BASOPHIL ACTIVATION: 18 %

## 2021-03-19 ENCOUNTER — HOSPITAL ENCOUNTER (OUTPATIENT)
Dept: RESPIRATORY THERAPY | Facility: CLINIC | Age: 51
Discharge: HOME | End: 2021-03-19
Attending: ALLERGY & IMMUNOLOGY | Admitting: ALLERGY & IMMUNOLOGY

## 2021-03-19 DIAGNOSIS — R06.2 WHEEZING: ICD-10-CM

## 2021-03-19 PROCEDURE — 250N000009 HC RX 250: Performed by: ALLERGY & IMMUNOLOGY

## 2021-03-19 PROCEDURE — 999N000157 HC STATISTIC RCP TIME EA 10 MIN

## 2021-03-19 PROCEDURE — 94060 EVALUATION OF WHEEZING: CPT

## 2021-03-19 PROCEDURE — 94726 PLETHYSMOGRAPHY LUNG VOLUMES: CPT

## 2021-03-19 PROCEDURE — 94729 DIFFUSING CAPACITY: CPT

## 2021-03-19 RX ORDER — ALBUTEROL SULFATE 0.83 MG/ML
2.5 SOLUTION RESPIRATORY (INHALATION)
Status: COMPLETED | OUTPATIENT
Start: 2021-03-19 | End: 2021-03-19

## 2021-03-19 RX ADMIN — ALBUTEROL SULFATE 2.5 MG: 2.5 SOLUTION RESPIRATORY (INHALATION) at 09:55

## 2021-03-19 NOTE — DISCHARGE INSTRUCTIONS
Thank you for completing pulmonary function testing today.  All results will be scanned into your epic results for your doctor to review.  Please resume taking all your current prescribed medications and diet as directed by your provider.   If you have not heard from your provider about your testing within two weeks and do not have a follow-up appointment scheduled with them please contact your provider about any questions you have concerning your testing.   Thank you  [facility]

## 2021-03-19 NOTE — PROGRESS NOTES
Pre-Bronch    Post-Bronch         Actual Pred %Pred  Actual %Pred %Chng       ---- SPIROMETRY ----                          FVC (L) 3.66 3.45 105   3.65 105 +0            FEV1 (L) 2.95 2.77 106   2.93 105 +0            FEV1/FVC (%) 81 81     80   +0            FEV1/SVC (%) 83 79                      FEV1/FEV6 (%) 81 82     80   +0            FEF Max (L/sec) 6.66 6.73 98   6.55 97 -1            FEF 25-75% (L/sec) 2.82 2.73 103   2.77 101 -1            FIVC (L) 3.16       3.24   +2            FIF Max (L/sec) 5.79 5.27 109   4.91 93 -15            Expiratory Time (sec) 6.69       6.57   -1            ----LUNG MECHANICS                           MVV (L/min)   97                      MEP (cmH2O)                          MIP (cmH2O)                          ---- LUNG VOLUMES ----                          SVC (L) 3.55 3.48 101                    IC (L) 2.84 2.41 117                    ERV (L) 0.71 1.07 66                    FRC(Pleth) (L) 2.99 2.69 111                    RV (Pleth) (L) 2.29 1.74 131                    TLC (Pleth) (L) 5.83 4.94 118                    RV/TLC (Pleth) (%) 39 36                      ---- DIFFUSION ----                          DLCOunc (ml/min/mmHg) 21.94 21.05 104                    DLCOcor (ml/min/mmHg)   21.05                      DL/VA (ml/min/mmHg/L) 4.47 4.31                      VA (L) 4.91 4.89 100                    IVC (L) 3.47                        Hgb (gm/dL)   12-18                         Call patient with PFT results.  PFT remains suggestive of asthma, likely mild intermittent as no true obstruction is noted.  PFT was notable for air trapping and bronchodilator response of +11.  Mild restriction noted as this was previously severe.  Test was somewhat limited by the expiratory phase which was only 5 seconds.    Continuing with current inhaler therapy at this time and will reevaluate and escalate as needed at the upcoming visit in September.

## 2021-03-20 LAB
DLCOUNC-%PRED-PRE: 104 %
DLCOUNC-PRE: 21.94 ML/MIN/MMHG
DLCOUNC-PRED: 21.05 ML/MIN/MMHG
ERV-%PRED-PRE: 66 %
ERV-PRE: 0.71 L
ERV-PRED: 1.07 L
EXPTIME-PRE: 6.69 SEC
FEF2575-%PRED-POST: 101 %
FEF2575-%PRED-PRE: 103 %
FEF2575-POST: 2.77 L/SEC
FEF2575-PRE: 2.82 L/SEC
FEF2575-PRED: 2.73 L/SEC
FEFMAX-%PRED-PRE: 98 %
FEFMAX-PRE: 6.66 L/SEC
FEFMAX-PRED: 6.73 L/SEC
FEV1-%PRED-PRE: 106 %
FEV1-PRE: 2.95 L
FEV1FEV6-PRE: 81 %
FEV1FEV6-PRED: 82 %
FEV1FVC-PRE: 81 %
FEV1FVC-PRED: 81 %
FEV1SVC-PRE: 83 %
FEV1SVC-PRED: 79 %
FIFMAX-PRE: 5.79 L/SEC
FRCPLETH-%PRED-PRE: 111 %
FRCPLETH-PRE: 2.99 L
FRCPLETH-PRED: 2.69 L
FVC-%PRED-PRE: 105 %
FVC-PRE: 3.66 L
FVC-PRED: 3.45 L
GAW-%PRED-PRE: 89 %
GAW-PRE: 0.92 L/S/CMH2O
GAW-PRED: 1.03 L/S/CMH2O
IC-%PRED-PRE: 117 %
IC-PRE: 2.84 L
IC-PRED: 2.41 L
RVPLETH-%PRED-PRE: 131 %
RVPLETH-PRE: 2.29 L
RVPLETH-PRED: 1.74 L
SGAW-%PRED-PRE: 297 %
SGAW-PRE: 0.3 1/CMH2O*S
SGAW-PRED: 0.1 1/CMH2O*S
SRAW-%PRED-PRE: 72 %
SRAW-PRE: 3.47 CMH2O*S
SRAW-PRED: 4.76 CMH2O*S
TLCPLETH-%PRED-PRE: 118 %
TLCPLETH-PRE: 5.83 L
TLCPLETH-PRED: 4.94 L
VA-%PRED-PRE: 100 %
VA-PRE: 4.91 L
VC-%PRED-PRE: 101 %
VC-PRE: 3.55 L
VC-PRED: 3.48 L

## 2021-03-22 NOTE — RESULT ENCOUNTER NOTE
Please call and inform her that PFT are normal. Please inquire how she feels on the inhaled steroid? How has albuterol been helping or not? Please let me know.     Dr. Borrego

## 2021-03-23 ENCOUNTER — TELEPHONE (OUTPATIENT)
Dept: ALLERGY | Facility: OTHER | Age: 51
End: 2021-03-23

## 2021-03-23 DIAGNOSIS — R06.2 WHEEZING: Primary | ICD-10-CM

## 2021-03-23 NOTE — TELEPHONE ENCOUNTER
RN left message for patient to return call to [phone] regarding result note from provider:    Kwame Borrego, DO [facility] Borrego Care Team Pool             Please call and inform her that PFT are normal. Please inquire how she feels on the inhaled steroid? How has albuterol been helping or not? Please let me know.       Soraya Ochoa RN

## 2021-03-23 NOTE — TELEPHONE ENCOUNTER
Yes have her try advair 250/50mcg 1 puff twice daily. Try for 4 weeks and return to clinic. Thanks.     Dr. Borrego

## 2021-03-23 NOTE — TELEPHONE ENCOUNTER
Yes okay to try off of Flovent if not helpful and with normal pft. Can we further inquire if she feels albuterol is helpful. If that is the case maybe trial generic advair for 4 weeks and see if helpful. Let me know. Thanks.

## 2021-03-23 NOTE — TELEPHONE ENCOUNTER
Spoke with patient regarding PFT results.  She is using the steroid inhaler, but states she doesn't think it's made a difference in her symptoms.  She's still having wheezing and is having to use her albuterol twice daily.  The albuterol is helpful when she uses it.  States the inhaler was very expensive ($8/puff) and if it's not helping, wondering if she really needs to continue on it.    Soraya Ochoa RN

## 2021-03-24 NOTE — TELEPHONE ENCOUNTER
Left detailed message for patient regarding medication change.  Asked her to return call if any questions.    Soraya Ochoa RN

## 2021-04-07 ENCOUNTER — APPOINTMENT (OUTPATIENT)
Dept: URBAN - METROPOLITAN AREA CLINIC 257 | Age: 51
Setting detail: DERMATOLOGY
End: 2021-04-08

## 2021-04-07 DIAGNOSIS — L70.0 ACNE VULGARIS: ICD-10-CM

## 2021-04-07 DIAGNOSIS — L81.0 POSTINFLAMMATORY HYPERPIGMENTATION: ICD-10-CM

## 2021-04-07 PROCEDURE — OTHER COUNSELING: OTHER

## 2021-04-07 PROCEDURE — 99213 OFFICE O/P EST LOW 20 MIN: CPT | Mod: 95

## 2021-04-07 PROCEDURE — OTHER PRESCRIPTION: OTHER

## 2021-04-07 RX ORDER — TRETIONIN 0.5 MG/G
CREAM TOPICAL
Qty: 1 | Refills: 2 | Status: ERX | COMMUNITY
Start: 2021-04-07

## 2021-04-07 ASSESSMENT — LOCATION DETAILED DESCRIPTION DERM: LOCATION DETAILED: LEFT INFERIOR CENTRAL MALAR CHEEK

## 2021-04-07 ASSESSMENT — LOCATION SIMPLE DESCRIPTION DERM: LOCATION SIMPLE: LEFT CHEEK

## 2021-04-07 ASSESSMENT — LOCATION ZONE DERM: LOCATION ZONE: FACE

## 2021-04-07 NOTE — PROCEDURE: COUNSELING
Benzoyl Peroxide Counseling: Patient counseled that medicine may cause skin irritation and bleach clothing.  In the event of skin irritation, the patient was advised to reduce the amount of the drug applied or use it less frequently.   The patient verbalized understanding of the proper use and possible adverse effects of benzoyl peroxide.  All of the patient's questions and concerns were addressed.
Topical Clindamycin Pregnancy And Lactation Text: This medication is Pregnancy Category B and is considered safe during pregnancy. It is unknown if it is excreted in breast milk.
Azithromycin Pregnancy And Lactation Text: This medication is considered safe during pregnancy and is also secreted in breast milk.
Sarecycline Pregnancy And Lactation Text: This medication is Pregnancy Category D and not consider safe during pregnancy. It is also excreted in breast milk.
Topical Sulfur Applications Counseling: Topical Sulfur Counseling: Patient counseled that this medication may cause skin irritation or allergic reactions.  In the event of skin irritation, the patient was advised to reduce the amount of the drug applied or use it less frequently.   The patient verbalized understanding of the proper use and possible adverse effects of topical sulfur application.  All of the patient's questions and concerns were addressed.
Include Pregnancy/Lactation Warning?: No
Topical Retinoid Pregnancy And Lactation Text: This medication is Pregnancy Category C. It is unknown if this medication is excreted in breast milk.
Dapsone Counseling: I discussed with the patient the risks of dapsone including but not limited to hemolytic anemia, agranulocytosis, rashes, methemoglobinemia, kidney failure, peripheral neuropathy, headaches, GI upset, and liver toxicity.  Patients who start dapsone require monitoring including baseline LFTs and weekly CBCs for the first month, then every month thereafter.  The patient verbalized understanding of the proper use and possible adverse effects of dapsone.  All of the patient's questions and concerns were addressed.
Tetracycline Counseling: Patient counseled regarding possible photosensitivity and increased risk for sunburn.  Patient instructed to avoid sunlight, if possible.  When exposed to sunlight, patients should wear protective clothing, sunglasses, and sunscreen.  The patient was instructed to call the office immediately if the following severe adverse effects occur:  hearing changes, easy bruising/bleeding, severe headache, or vision changes.  The patient verbalized understanding of the proper use and possible adverse effects of tetracycline.  All of the patient's questions and concerns were addressed. Patient understands to avoid pregnancy while on therapy due to potential birth defects.
Benzoyl Peroxide Pregnancy And Lactation Text: This medication is Pregnancy Category C. It is unknown if benzoyl peroxide is excreted in breast milk.
Dapsone Pregnancy And Lactation Text: This medication is Pregnancy Category C and is not considered safe during pregnancy or breast feeding.
Detail Level: Zone
Birth Control Pills Counseling: Birth Control Pill Counseling: I discussed with the patient the potential side effects of OCPs including but not limited to increased risk of stroke, heart attack, thrombophlebitis, deep venous thrombosis, hepatic adenomas, breast changes, GI upset, headaches, and depression.  The patient verbalized understanding of the proper use and possible adverse effects of OCPs. All of the patient's questions and concerns were addressed.
Topical Clindamycin Counseling: Patient counseled that this medication may cause skin irritation or allergic reactions.  In the event of skin irritation, the patient was advised to reduce the amount of the drug applied or use it less frequently.   The patient verbalized understanding of the proper use and possible adverse effects of clindamycin.  All of the patient's questions and concerns were addressed.
Spironolactone Counseling: Patient advised regarding risks of diarrhea, abdominal pain, hyperkalemia, birth defects (for female patients), liver toxicity and renal toxicity. The patient may need blood work to monitor liver and kidney function and potassium levels while on therapy. The patient verbalized understanding of the proper use and possible adverse effects of spironolactone.  All of the patient's questions and concerns were addressed.
Doxycycline Pregnancy And Lactation Text: This medication is Pregnancy Category D and not consider safe during pregnancy. It is also excreted in breast milk but is considered safe for shorter treatment courses.
Topical Retinoid counseling:  Patient advised to apply a pea-sized amount only at bedtime and wait 30 minutes after washing their face before applying.  If too drying, patient may add a non-comedogenic moisturizer. The patient verbalized understanding of the proper use and possible adverse effects of retinoids.  All of the patient's questions and concerns were addressed.
Isotretinoin Pregnancy And Lactation Text: This medication is Pregnancy Category X and is considered extremely dangerous during pregnancy. It is unknown if it is excreted in breast milk.
Topical Sulfur Applications Pregnancy And Lactation Text: This medication is Pregnancy Category C and has an unknown safety profile during pregnancy. It is unknown if this topical medication is excreted in breast milk.
Isotretinoin Counseling: Patient should get monthly blood tests, not donate blood, not drive at night if vision affected, not share medication, and not undergo elective surgery for 6 months after tx completed. Side effects reviewed, pt to contact office should one occur.
Erythromycin Pregnancy And Lactation Text: This medication is Pregnancy Category B and is considered safe during pregnancy. It is also excreted in breast milk.
Azithromycin Counseling:  I discussed with the patient the risks of azithromycin including but not limited to GI upset, allergic reaction, drug rash, diarrhea, and yeast infections.
Bactrim Counseling:  I discussed with the patient the risks of sulfa antibiotics including but not limited to GI upset, allergic reaction, drug rash, diarrhea, dizziness, photosensitivity, and yeast infections.  Rarely, more serious reactions can occur including but not limited to aplastic anemia, agranulocytosis, methemoglobinemia, blood dyscrasias, liver or kidney failure, lung infiltrates or desquamative/blistering drug rashes.
High Dose Vitamin A Counseling: Side effects reviewed, pt to contact office should one occur.
Sarecycline Counseling: Patient advised regarding possible photosensitivity and discoloration of the teeth, skin, lips, tongue and gums.  Patient instructed to avoid sunlight, if possible.  When exposed to sunlight, patients should wear protective clothing, sunglasses, and sunscreen.  The patient was instructed to call the office immediately if the following severe adverse effects occur:  hearing changes, easy bruising/bleeding, severe headache, or vision changes.  The patient verbalized understanding of the proper use and possible adverse effects of sarecycline.  All of the patient's questions and concerns were addressed.
Erythromycin Counseling:  I discussed with the patient the risks of erythromycin including but not limited to GI upset, allergic reaction, drug rash, diarrhea, increase in liver enzymes, and yeast infections.
Spironolactone Pregnancy And Lactation Text: This medication can cause feminization of the male fetus and should be avoided during pregnancy. The active metabolite is also found in breast milk.
Doxycycline Counseling:  Patient counseled regarding possible photosensitivity and increased risk for sunburn.  Patient instructed to avoid sunlight, if possible.  When exposed to sunlight, patients should wear protective clothing, sunglasses, and sunscreen.  The patient was instructed to call the office immediately if the following severe adverse effects occur:  hearing changes, easy bruising/bleeding, severe headache, or vision changes.  The patient verbalized understanding of the proper use and possible adverse effects of doxycycline.  All of the patient's questions and concerns were addressed.
Tazorac Pregnancy And Lactation Text: This medication is not safe during pregnancy. It is unknown if this medication is excreted in breast milk.
Tazorac Counseling:  Patient advised that medication is irritating and drying.  Patient may need to apply sparingly and wash off after an hour before eventually leaving it on overnight.  The patient verbalized understanding of the proper use and possible adverse effects of tazorac.  All of the patient's questions and concerns were addressed.
Bactrim Pregnancy And Lactation Text: This medication is Pregnancy Category D and is known to cause fetal risk.  It is also excreted in breast milk.
Minocycline Counseling: Patient advised regarding possible photosensitivity and discoloration of the teeth, skin, lips, tongue and gums.  Patient instructed to avoid sunlight, if possible.  When exposed to sunlight, patients should wear protective clothing, sunglasses, and sunscreen.  The patient was instructed to call the office immediately if the following severe adverse effects occur:  hearing changes, easy bruising/bleeding, severe headache, or vision changes.  The patient verbalized understanding of the proper use and possible adverse effects of minocycline.  All of the patient's questions and concerns were addressed.
High Dose Vitamin A Pregnancy And Lactation Text: High dose vitamin A therapy is contraindicated during pregnancy and breast feeding.
Birth Control Pills Pregnancy And Lactation Text: This medication should be avoided if pregnant and for the first 30 days post-partum.

## 2021-04-28 ENCOUNTER — MYC MEDICAL ADVICE (OUTPATIENT)
Dept: FAMILY MEDICINE | Facility: CLINIC | Age: 51
End: 2021-04-28

## 2021-04-28 DIAGNOSIS — G47.09 OTHER INSOMNIA: ICD-10-CM

## 2021-04-28 RX ORDER — TRAZODONE HYDROCHLORIDE 50 MG/1
TABLET, FILM COATED ORAL
Qty: 180 TABLET | Refills: 6 | Status: SHIPPED | OUTPATIENT
Start: 2021-04-28 | End: 2022-04-14

## 2021-04-28 NOTE — TELEPHONE ENCOUNTER
Prescription approved per [facility] Refill Protocol.  Amelia Storey RN

## 2021-04-29 ENCOUNTER — VIRTUAL VISIT (OUTPATIENT)
Dept: NEUROSURGERY | Facility: CLINIC | Age: 51
End: 2021-04-29

## 2021-04-29 DIAGNOSIS — M54.12 CERVICAL RADICULOPATHY: Primary | ICD-10-CM

## 2021-04-29 PROCEDURE — 99214 OFFICE O/P EST MOD 30 MIN: CPT | Performed by: PHYSICIAN ASSISTANT

## 2021-04-29 NOTE — PROGRESS NOTES
Earlene is a 50 year old who is being evaluated via a billable telephone visit.      What phone number would you like to be contacted at? [phone]  How would you like to obtain your AVS? Dewayne Henao is a 50 year old who presents for the following health issues: neck pain, left arm pain    HPI   She notes left arm pain and neck pain that has been present for about 5 or 6 weeks, with no particular event or injury.  She describes pain that radiates down the lateral aspect of her upper arm and forearm, into her wrist.  She occasionally has pain into her second and third fingers on her left hand, but not persistently.  She has had multiple conservative treatment options including massage therapy and chiropractic care.  These did not provide her with any symptomatic improvement.  She states that ibuprofen helps her for a few hours, then her pain returns.  She does not note any significant weakness in her left arm.  No bowel or bladder changes, no problems with balance or coordination.      Review of Systems   CONSTITUTIONAL: NEGATIVE for fever, chills, change in weight  ENT/MOUTH: NEGATIVE for ear, mouth and throat problems  RESP: NEGATIVE for significant cough or SOB  CV: NEGATIVE for chest pain, palpitations or peripheral edema      Objective           Vitals:  No vitals were obtained today due to virtual visit.    Physical Exam   healthy, alert and no distress  PSYCH: Alert and oriented times 3; coherent speech, normal   rate and volume, able to articulate logical thoughts, able   to abstract reason, no tangential thoughts, no hallucinations   or delusions  Her affect is normal and pleasant  RESP: No cough, no audible wheezing, able to talk in full sentences  Remainder of exam unable to be completed due to telephone visits      Assessment/plan:  We did have a discussion regarding her symptoms.  At this point, she has had 5 or 6 weeks of gradually worsening left-sided neck and left arm pain.  She  "was concerned about shoulder pathology, but we discussed that this typically would not progress past the elbow.  Which she is describing does sound like possibly a radicular pain.  I do recommend obtaining a cervical spine MRI for further evaluation.  She would like to proceed with that option.  We will contact her by phone once results are available.  She voiced agreement and understanding.        Phone call duration: 10 minutes                      Earlene Mark is a 50 year old female who presents for:  Chief Complaint   Patient presents with     Neurologic Problem     Possible pinched nerve        Initial Vitals:  LMP 02/01/2010  Estimated body mass index is 22.31 kg/m  as calculated from the following:    Height as of 3/10/21: 5' 4\" (1.626 m).    Weight as of 3/10/21: 130 lb (59 kg).. There is no height or weight on file to calculate BSA. BP completed using cuff size: regular  Data Unavailable    Nursing Comments: Pain : 9     Jelly Alberto CMA    "

## 2021-04-29 NOTE — LETTER
4/29/2021         RE: Earlene Mark  [address]  [place] [address]        Dear Colleague,    Thank you for referring your patient, Earlene Mark, to the [facility] [place]. Please see a copy of my visit note below.    Earlene is a 50 year old who is being evaluated via a billable telephone visit.      What phone number would you like to be contacted at? [phone]  How would you like to obtain your AVS? Dewayne Isaac   Earlene is a 50 year old who presents for the following health issues: neck pain, left arm pain    HPI   She notes left arm pain and neck pain that has been present for about 5 or 6 weeks, with no particular event or injury.  She describes pain that radiates down the lateral aspect of her upper arm and forearm, into her wrist.  She occasionally has pain into her second and third fingers on her left hand, but not persistently.  She has had multiple conservative treatment options including massage therapy and chiropractic care.  These did not provide her with any symptomatic improvement.  She states that ibuprofen helps her for a few hours, then her pain returns.  She does not note any significant weakness in her left arm.  No bowel or bladder changes, no problems with balance or coordination.      Review of Systems   CONSTITUTIONAL: NEGATIVE for fever, chills, change in weight  ENT/MOUTH: NEGATIVE for ear, mouth and throat problems  RESP: NEGATIVE for significant cough or SOB  CV: NEGATIVE for chest pain, palpitations or peripheral edema      Objective           Vitals:  No vitals were obtained today due to virtual visit.    Physical Exam   healthy, alert and no distress  PSYCH: Alert and oriented times 3; coherent speech, normal   rate and volume, able to articulate logical thoughts, able   to abstract reason, no tangential thoughts, no hallucinations   or delusions  Her affect is normal and pleasant  RESP: No cough, no audible wheezing, able to  "talk in full sentences  Remainder of exam unable to be completed due to telephone visits      Assessment/plan:  We did have a discussion regarding her symptoms.  At this point, she has had 5 or 6 weeks of gradually worsening left-sided neck and left arm pain.  She was concerned about shoulder pathology, but we discussed that this typically would not progress past the elbow.  Which she is describing does sound like possibly a radicular pain.  I do recommend obtaining a cervical spine MRI for further evaluation.  She would like to proceed with that option.  We will contact her by phone once results are available.  She voiced agreement and understanding.        Phone call duration: 10 minutes                      Earlene Mark is a 50 year old female who presents for:  Chief Complaint   Patient presents with     Neurologic Problem     Possible pinched nerve        Initial Vitals:  LMP 02/01/2010  Estimated body mass index is 22.31 kg/m  as calculated from the following:    Height as of 3/10/21: 5' 4\" (1.626 m).    Weight as of 3/10/21: 130 lb (59 kg).. There is no height or weight on file to calculate BSA. BP completed using cuff size: regular  Data Unavailable    Nursing Comments: Pain : 9     Jelly Alberto CMA        Again, thank you for allowing me to participate in the care of your patient.        Sincerely,        Leonidas Hong PA-C    "

## 2021-04-30 ENCOUNTER — MYC MEDICAL ADVICE (OUTPATIENT)
Dept: NEUROSURGERY | Facility: CLINIC | Age: 51
End: 2021-04-30

## 2021-04-30 DIAGNOSIS — M54.12 CERVICAL RADICULOPATHY: Primary | ICD-10-CM

## 2021-04-30 NOTE — TELEPHONE ENCOUNTER
Reviewed Leonidas Hong PA-C, note from 4/29 and recommended cervical spine MRI. Order placed and communicated this via my-chart.

## 2021-05-01 ENCOUNTER — MYC MEDICAL ADVICE (OUTPATIENT)
Dept: NEUROSURGERY | Facility: CLINIC | Age: 51
End: 2021-05-01

## 2021-05-02 ENCOUNTER — MYC MEDICAL ADVICE (OUTPATIENT)
Dept: FAMILY MEDICINE | Facility: CLINIC | Age: 51
End: 2021-05-02

## 2021-05-02 DIAGNOSIS — N63.20 LEFT BREAST LUMP: Primary | ICD-10-CM

## 2021-05-03 ENCOUNTER — HOSPITAL ENCOUNTER (OUTPATIENT)
Dept: MRI IMAGING | Facility: CLINIC | Age: 51
Discharge: HOME | End: 2021-05-03
Attending: PHYSICIAN ASSISTANT | Admitting: PHYSICIAN ASSISTANT

## 2021-05-03 DIAGNOSIS — M54.12 CERVICAL RADICULOPATHY: ICD-10-CM

## 2021-05-03 PROCEDURE — 72141 MRI NECK SPINE W/O DYE: CPT

## 2021-05-03 NOTE — TELEPHONE ENCOUNTER
She had a diagnostic Mammo with US done in August of 2020 so needs a follow up for that.  Can we get that scheduled for her?  See if I ordered it correctly.    Electronically signed by:  Kushal Linda M.D.  5/3/2021

## 2021-05-03 NOTE — TELEPHONE ENCOUNTER
Spoke to Leonidas Hong PA-C, regarding patient request. At this time he does not want to order an additional MRI but will refer her to Ortho if the cervical MRI is negative. Responded to patient via my-chart.

## 2021-05-04 ENCOUNTER — TELEPHONE (OUTPATIENT)
Dept: NEUROSURGERY | Facility: CLINIC | Age: 51
End: 2021-05-04

## 2021-05-04 DIAGNOSIS — M54.12 CERVICAL RADICULOPATHY: Primary | ICD-10-CM

## 2021-05-04 NOTE — TELEPHONE ENCOUNTER
Patient is requesting a call back, she has some questions that she would like addressed, she can be reached at [phone].

## 2021-05-04 NOTE — TELEPHONE ENCOUNTER
Called pt and left detailed message. Recommend C6-7 epidural steroid injection. Will place orders.     Leonidas Hong PA-C

## 2021-05-05 ENCOUNTER — TELEPHONE (OUTPATIENT)
Dept: NEUROSURGERY | Facility: CLINIC | Age: 51
End: 2021-05-05

## 2021-05-05 ENCOUNTER — TELEPHONE (OUTPATIENT)
Dept: PALLIATIVE MEDICINE | Facility: CLINIC | Age: 51
End: 2021-05-05

## 2021-05-05 ENCOUNTER — MYC MEDICAL ADVICE (OUTPATIENT)
Dept: NEUROSURGERY | Facility: CLINIC | Age: 51
End: 2021-05-05

## 2021-05-05 DIAGNOSIS — M54.12 CERVICAL RADICULOPATHY: Primary | ICD-10-CM

## 2021-05-05 NOTE — TELEPHONE ENCOUNTER
Pt was informed estimated cost would be $780 and a portion would be required to be paid up front    Screening Questions for Radiology Injections:    Injection to be done at which interventional clinic site? [facility] [place]    If [facility] location, tell patient that this procedure requires a COVID-19 lab test be done within 4 days of the procedure. Would you still like to move forward with scheduling the procedure?  Not Applicable   If YES, let patient know that someone will call them to schedule the COVID-19 test and that they will only receive a call back if the result is positive. Route to nursing to enter order.     Instruct patient to arrive as directed prior to the scheduled appointment time:    [place] minutes before      [place]: 30 minutes before; if IV needed 1 hour before     Procedure ordered by Hal    Procedure ordered? Cervical JAZZY      Transforaminal Cervical JAZZY - no pain provider currently performing    As a reminder, receiving steroids can decrease your body's ability to fight infection.   Would you still like to move forward with scheduling the injection?  Yes    What insurance would patient like us to bill for this procedure? SELF PAY      Worker's comp or MVA (motor vehicle accident) -Any injection DO NOT SCHEDULE and route to [facility].      Rule. insurance - For SI joint injections, DO NOT SCHEDULE and route [facility].       ALL BCBS, [employer] and [employer]-Route to [place] for review DO NOT SCHEDULE      IF SCHEDULING IN [place] AND NEEDS A PA, IT IS OKAY TO SCHEDULE. [place] HANDLES THEIR OWN PA'S AFTER THE PATIENT IS SCHEDULED. PLEASE SCHEDULE AT LEAST 1 WEEK OUT SO A PA CAN BE OBTAINED.    Any chance of pregnancy? NO   If YES, do NOT schedule and route to RN pool    Is an  needed? No     Patient has a drive home? (mandatory) YES: INFORMED    Is patient taking any blood thinners (i.e. plavix, coumadin, jantoven, warfarin,  heparin, pradaxa or dabigatran, etc)? No   If hold needed, do NOT schedule, route to RN pool     Is patient taking any aspirin products (includes Excedrin and Fiorinal)? No     If more than 325mg/day, OK to schedule; Instruct pt to decrease to less than 325 mg for 7 days AND route to RN pool    For CERVICAL procedures, hold all aspirin products for 6 days.     Tell pt that if aspirin product is not held for 6 days, the procedure WILL BE cancelled.      Does the patient have a bleeding or clotting disorder? No     If YES, okay to schedule AND route to RN nurse pool    For any patients with platelet count <100, must be forwarded to provider    Any allergies to contrast dye, iodine, shellfish, or numbing and steroid medications? No    If YES, add allergy information to appointment notes AND route to the RN pool     If JAZZY and Contrast Dye Allergy? DO NOT SCHEDULE, route to RN pool    Allergies: Morphine, Prochlorperazine, and Seasonal allergies     Is patient diabetic?  No  If YES, instruct them to bring their glucometer.    Does patient have an active infection or treated for one within the past week? No     Is patient currently taking any antibiotics?  No     For patients on chronic, preventative, or prophylactic antibiotics, procedures may be scheduled.     For patients on antibiotics for active or recent infection:antibiotic course must have been completed for 4 days    Is patient currently taking any steroid medications? (i.e. Prednisone, Medrol)  No     For patients on steroid medications, course must have been completed for 4 days    Is patient actively being treated for cancer or immunocompromised? No  If YES, do NOT schedule and route to RN pool     Are you able to get on and off an exam table with minimal or no assistance? Yes  If NO, do NOT schedule and route to RN pool    Are you able to roll over and lay on your stomach with minimal or no assistance? Yes  If NO, do NOT schedule and route to RN pool     Has  the patient had a flu shot or any other vaccinations within 7 days before or after the procedure.  No     Have you recently had a COVID vaccine or have plans to get it in the near future? No    If yes, explain that for the vaccine to work best they need to:       wait 1 week before and 1 week after getting Vaccine #1    wait 1 week before and 2 weeks after getting Vaccine #2    If patient has concerns about the timing, send to RN pool     Does patient have an MRI/CT?  YES: 2021  Check Procedure Scheduling Grid to see if required.      Was the MRI done within the last 3 years?  Yes    If yes, where was the MRI done i.e.[facility], [facility], [facility], [facility] etc? [facility]      If no, do not schedule and route to RN pool    If MRI was not done at [facility], [facility] or [facility] do NOT schedule and route to RN pool.      If pt has an imaging disc, the injection MAY be scheduled but pt has to bring disc to appt.     If they show up without the disc the injection cannot be done    Procedure Specific Instructions:      If celiac plexus block, informed patient NPO for 6 hours and that it is okay to take medications with sips of water, especially blood pressure medications  Not Applicable         If this is for a cervical procedure, informed patient that aspirin needs to be held for 6 days.   Not Applicable      If IV needed:    Do not schedule procedures requiring IV placement in the first appointment of the day or first appointment after lunch. Do NOT schedule at 0745, 0815 or 1245.     Instructed pt to arrive 30 minutes early for IV start if required. (Check Procedure Scheduling Grid)  YES:     Reminders:      If you are started on any steroids or antibiotics between now and your appointment, you must contact us because the procedure may need to be cancelled.  Yes      For all procedures except radiofrequency ablations (RFAs) and spinal cord stimulator (SCS) trials, informed patient:    IV sedation is not  provided for this procedure.  If you feel that an oral anti-anxiety medication is needed, you can discuss this further with your referring provider or primary care provider.  The Pain Clinic provider will discuss specifics of what the procedure includes at your appointment.  Most procedures last 10-20 minutes.  We use numbing medications to help with any discomfort during the procedure.  Not Applicable      For patients 85 or older we recommend having an adult stay w/ them for the remainder of the day.       Does the patient have any questions?  NO  Heather Ramirez  [facility]

## 2021-05-05 NOTE — TELEPHONE ENCOUNTER
Reason for call: Pt called to ask for another reference for her injection because Dr. Esparza is booked out for several weeks and she wants to get treated as soon as possible. Please call her back at [phone]. Thank you.

## 2021-05-05 NOTE — TELEPHONE ENCOUNTER
New order placed with [facility]. Sent patient [employer].

## 2021-05-05 NOTE — TELEPHONE ENCOUNTER
Patient called to schedule ANANTH with Dr. Esparza, offered 5/21/21, pt did not feel that she could wait that long.  Referred her back to  [facility].

## 2021-05-06 ENCOUNTER — RADIOLOGY INJECTION OFFICE VISIT (OUTPATIENT)
Dept: PALLIATIVE MEDICINE | Facility: CLINIC | Age: 51
End: 2021-05-06

## 2021-05-06 VITALS — SYSTOLIC BLOOD PRESSURE: 128 MMHG | HEART RATE: 51 BPM | DIASTOLIC BLOOD PRESSURE: 65 MMHG | OXYGEN SATURATION: 100 %

## 2021-05-06 DIAGNOSIS — M54.12 CERVICAL RADICULOPATHY: ICD-10-CM

## 2021-05-06 PROCEDURE — 62321 NJX INTERLAMINAR CRV/THRC: CPT | Performed by: PHYSICAL MEDICINE & REHABILITATION

## 2021-05-06 NOTE — Clinical Note
Cervical epidural steroid injection completed. Procedure completed at the C7-T1 level due to scant epidural space at the C6-7 level.    Thanks,    DO [facility]

## 2021-05-06 NOTE — PATIENT INSTRUCTIONS
[facility] Procedure Discharge Instructions    Today you saw:    Dr. Lopez Lim    Your procedure:  Epidural steroid injection       Medications used:  Lidocaine (anesthetic) Dexamethasone (steroid)  Normal Saline, Omnipaque (contrast)           Be cautious as numbness and/or weakness in the area may occur up to 6-8 hours after the procedure due to effect of the local anesthetic    Do not drive for 6 hours. The effect of the local anesthetic could slow your reflexes.     Avoid strenuous activity for the first 24 hours. You may resume your regular activities after that.     You may shower, however avoid swimming, tub baths or hot tubs for 24 hours following your procedure    You may have a mild to moderate increase in pain for several days following the injection.      You may use ice packs for 10-15 minutes, 3 to 4 times a day at the injection site for comfort    Do not use heat to painful areas for 6 to 8 hours. This will give the local anesthetic time to wear off and prevent you from accidentally burning your skin.    Unless you have been directed to avoid the use of anti-inflammatory medications (NSAIDS-ibuprofen, Aleve, Motrin), you may use these medications or Tylenol for pain control if needed.     With diabetes, check your blood sugar more frequently than usual as your blood sugar may be higher than normal for 10-14 days following a steroid injection. Contact your doctor who manages your diabetes if your blood sugar is higher than usual    Possible side effects of steroids that you may experience include flushing, elevated blood pressure, increased appetite, mild headaches and restlessness.  All of these symptoms will get better with time.    It may take up to 14 days for the steroid medication to start working although you may feel the effect as early as a few days after the procedure.     Follow up with your referring provider in 2-3 weeks      If you experience any of the  following, call the pain center line during work hours at [phone] or on-call physician after hours at [phone]:  -Fever over 100 degree F  -Swelling, bleeding, redness, drainage, warmth at the injection site  -Progressive weakness or numbness in your arms  -Loss of bowel or bladder function  -Unusual headache that is not relieved by Tylenol or your regular headache medication  -Unusual new onset of pain that is not improving

## 2021-05-06 NOTE — NURSING NOTE
Pre-procedure Intake    Have you been fasting? NA    If yes, for how long?     Are you taking a prescribed blood thinner such as coumadin, Plavix, Xarelto?    No    If yes, when did you take your last dose?     Do you take aspirin?  No    If cervical procedure, have you held aspirin for 6 days?   No     Do you have any allergies to contrast dye, iodine, steroid and/or numbing medications?  NO    Are you currently taking antibiotics or have an active infection?  NO    Have you had a fever/elevated temperature within the past week? NO    Are you currently taking oral steroids? NO    Do you have a ? Yes       Are you pregnant or breastfeeding?  NO    Have you received the COVID-19 vaccine? No        If yes, was it your 1st, 2nd or only dose needed?     Date of most recent vaccine:     Are the vital signs normal?  Yes

## 2021-05-06 NOTE — PROGRESS NOTES
[facility] - Procedure Note    Date of Visit: 5/6/2021    Procedure performed: C7-T1 interlaminar epidural steroid injection with fluoroscopic guidance  Diagnosis: Cervical spondylosis; Cervical radiculitis/radiculopathy  : Lopez Lim DO & Mj Markham DO (Fellow)   Anesthesia: none    Indications: Earlene Mark is a 50 year old female who is seen at the request of Leonidas Hong PA-C for cervical epidural steroid injection. The patient describes left sided neck pain with pain radiating into the left upper extremity to the hand. The patient has been exhibiting symptoms consistent with cervical intraspinal inflammation and radiculopathy. Symptoms have been persistent, disabling, and intermittently severe. The patient reports minimal improvement with conservative treatment, including oral mediations and exercises.    Cervical MRI was done on 5/3/21 which showed   C3-C4:  Mild disc height loss. Disc osteophyte complex, asymmetric to  the left. Mild right facet arthropathy. Severe left facet arthropathy  with marrow edema suggestive of active arthropathy. No right foraminal  stenosis. Severe left foraminal stenosis. No spinal canal stenosis.      C4-C5:  Mild disc height loss. Disc osteophyte complex, asymmetric to  the left. Mild right facet arthropathy. Severe left facet arthropathy.  No right foraminal stenosis. Severe left foraminal stenosis. Mild  spinal canal stenosis.     C5-C6:  Mild disc height loss. Disc osteophyte complex. Mild bilateral  facet arthropathy. Mild right foraminal stenosis. Moderate left  foraminal stenosis. Mild spinal canal stenosis.      C6-C7:  Mild disc height loss. Disc osteophyte complex, asymmetric to  the left. Mild bilateral facet arthropathy. No right foraminal  stenosis. Severe left foraminal stenosis. Mild spinal canal stenosis.     C7-T1: Disc height maintained. No herniation. Mild bilateral facet  arthropathy. No foraminal or spinal  canal stenosis.     T1-T2: Moderate right and mild left facet arthropathy with grade 1  anterolisthesis of T1 on T2. No foraminal or spinal canal stenosis.  Presumed nerve root sleeve cyst along the lateral aspect of the left  foramen.                                                                      IMPRESSION:       1. Severe facet arthropathy on the left at C3-C4 with marrow edema  suggestive of active arthropathy.  2. Severe foraminal stenoses on the left at C3-C4, C4-C5, and C6-C7.  3. Moderate foraminal stenosis on the left at C5-C6.     ADRY FAJARDO MD      Allergies:      Allergies   Allergen Reactions     Morphine      Prochlorperazine      compazine -dystonic reaction     Seasonal Allergies         Vitals:  /65   Pulse 51   LMP 02/01/2010   SpO2 100%     Review of Systems: The patient denies recent fever, chills, illness, use of antibiotics or anticoagulants. All other 10-point review of systems negative.     Procedure: The procedure and risks were explained, and informed written consent was obtained from the patient. Risks include but are not limited to: infection, bleeding, increased pain, and damage to soft tissue, nerve, muscle, and vasculature structures. After getting informed consent, patient was brought into the procedure suite and was placed in a prone position on the procedure table. A Pause for the Cause was performed. Patient was prepped and draped in sterile fashion.     The C7-T1 interspace was identified with use of fluoroscopy in AP view. A 25-gauge, 1.5 inch needle was used to anesthetize the skin and subcutaneous tissue entry site with a total of 2 ml of 1% lidocaine. Under fluoroscopic visualization, a 22-gauge, 3.5 inch Tuohy epidural needle was slowly advanced towards the epidural space a few millimeters left-sided of midline. The latter part of the needle advancement was guided with fluoroscopy in the lateral view. The epidural space was identified using loss of  resistance technique. After negative aspiration for heme and cerebrospinal fluid, a total of 1 mL of non-ionic contrast was injected to confirm needle placement. 9 mL of contrast was wasted. Epidurogram confirmed spread within the posterior epidural space. A solution containing 1ml of 40mg/ml of Triamcinolone and 2 ml of preservative free saline was injected. The needle was removed.  Images were saved to PACS.    The patient tolerated the procedure well, and there was no evidence of procedural complications. No new sensory or motor deficits were noted following the procedure. The patient was stable and able to ambulate on discharge home. Post-procedure instructions were provided.     Pre-procedure pain score: 6/10 in the neck, 8/10 in the arm  Post-procedure pain score: 1/10 in the neck, 5/10 in the arm    Assessment/Plan: Earlene Mark is a 50 year old female s/p cervical interlaminar epidural steroid injection today for cervical spondylosis and radiculitis/radiculopathy.     1. Following today's procedure, the patient was advised to contact the [facility] for any of the following:   Fever, chills, or night sweats   New onset of pain, numbness, or weakness   Any questions/concerns regarding the procedure  If unable to contact the Pain Center, the patient was instructed to go to a local Emergency Room for any complications.   2. The patient will receive a follow-up call in 1 week.   3. Follow-up with the referring provider in 2 weeks for post-procedure evaluation.    Lopez Lim DO  [facility] [place]

## 2021-05-06 NOTE — NURSING NOTE
Discharge Information    IV Discontiued Time:  NA    Amount of Fluid Infused:  NA    Discharge Criteria = When patient returns to baseline or as per MD order    Consciousness:  Pt is fully awake    Circulation:  BP +/- 20% of pre-procedure level    Respiration:  Patient is able to breathe deeply    O2 Sat:  Patient is able to maintain O2 Sat >92% on room air    Activity:  Moves 4 extremities on command    Ambulation:  Patient is able to stand and walk or stand and pivot into wheelchair    Dressing:  Clean/dry or No Dressing    Notes:   Discharge instructions and AVS given to patient    Patient meets criteria for discharge?  YES    Admitted to PCU?  No    Responsible adult present to accompany patient home?  Yes    Signature/Title:    Sharon Georges RN  RN Care Coordinator  [facility]

## 2021-05-11 NOTE — TELEPHONE ENCOUNTER
Spoke with patient and answered her questions regarding MRI. She did get the JAZZY last week and so far is doing well.     Leonidas Hong PA-C

## 2021-06-07 ENCOUNTER — MEDICAL CORRESPONDENCE (OUTPATIENT)
Dept: HEALTH INFORMATION MANAGEMENT | Facility: CLINIC | Age: 51
End: 2021-06-07

## 2021-06-08 DIAGNOSIS — F45.8 ANXIETY HYPERVENTILATION: ICD-10-CM

## 2021-06-08 DIAGNOSIS — G47.09 INITIAL INSOMNIA: Primary | ICD-10-CM

## 2021-06-08 DIAGNOSIS — E55.9 AVITAMINOSIS D: ICD-10-CM

## 2021-06-08 DIAGNOSIS — N91.2 ABSENCE OF MENSTRUATION: ICD-10-CM

## 2021-06-08 DIAGNOSIS — R53.83 FATIGUE: ICD-10-CM

## 2021-06-08 DIAGNOSIS — F41.9 ANXIETY HYPERVENTILATION: ICD-10-CM

## 2021-06-09 DIAGNOSIS — E55.9 AVITAMINOSIS D: ICD-10-CM

## 2021-06-09 DIAGNOSIS — G47.09 INITIAL INSOMNIA: ICD-10-CM

## 2021-06-09 DIAGNOSIS — F45.8 ANXIETY HYPERVENTILATION: ICD-10-CM

## 2021-06-09 DIAGNOSIS — F41.9 ANXIETY HYPERVENTILATION: ICD-10-CM

## 2021-06-09 DIAGNOSIS — R53.83 FATIGUE: ICD-10-CM

## 2021-06-09 DIAGNOSIS — N91.2 ABSENCE OF MENSTRUATION: ICD-10-CM

## 2021-06-09 LAB
ESTRADIOL SERPL-MCNC: 155 PG/ML
IRON SATN MFR SERPL: 25 % (ref 15–46)
IRON SERPL-MCNC: 68 UG/DL (ref 35–180)
PROGEST SERPL-MCNC: 24.6 NG/ML
TIBC SERPL-MCNC: 274 UG/DL (ref 240–430)

## 2021-06-09 PROCEDURE — 83550 IRON BINDING TEST: CPT | Performed by: NURSE PRACTITIONER

## 2021-06-09 PROCEDURE — 36415 COLL VENOUS BLD VENIPUNCTURE: CPT | Performed by: NURSE PRACTITIONER

## 2021-06-09 PROCEDURE — 82627 DEHYDROEPIANDROSTERONE: CPT | Performed by: NURSE PRACTITIONER

## 2021-06-09 PROCEDURE — 83540 ASSAY OF IRON: CPT | Performed by: NURSE PRACTITIONER

## 2021-06-09 PROCEDURE — 99000 SPECIMEN HANDLING OFFICE-LAB: CPT | Performed by: NURSE PRACTITIONER

## 2021-06-09 PROCEDURE — 82670 ASSAY OF TOTAL ESTRADIOL: CPT | Performed by: NURSE PRACTITIONER

## 2021-06-09 PROCEDURE — 82306 VITAMIN D 25 HYDROXY: CPT | Performed by: NURSE PRACTITIONER

## 2021-06-09 PROCEDURE — 84403 ASSAY OF TOTAL TESTOSTERONE: CPT | Mod: 90 | Performed by: NURSE PRACTITIONER

## 2021-06-09 PROCEDURE — 84144 ASSAY OF PROGESTERONE: CPT | Performed by: NURSE PRACTITIONER

## 2021-06-10 LAB — DHEA-S SERPL-MCNC: 230 UG/DL (ref 35–430)

## 2021-06-11 LAB — TESTOST SERPL-MCNC: 34 NG/DL (ref 8–60)

## 2021-06-17 DIAGNOSIS — G43.009 MIGRAINE WITHOUT AURA AND WITHOUT STATUS MIGRAINOSUS, NOT INTRACTABLE: ICD-10-CM

## 2021-06-17 LAB
DEPRECATED CALCIDIOL+CALCIFEROL SERPL-MC: <84 UG/L (ref 20–75)
VITAMIN D2 SERPL-MCNC: <5 UG/L
VITAMIN D3 SERPL-MCNC: 79 UG/L

## 2021-06-17 NOTE — TELEPHONE ENCOUNTER
Routing refill request to provider for review/approval because:  Patient has used more then 8 doses in a month.     Radha Toney RN

## 2021-06-20 RX ORDER — RIZATRIPTAN BENZOATE 10 MG/1
TABLET ORAL
Qty: 54 TABLET | Refills: 3 | Status: SHIPPED | OUTPATIENT
Start: 2021-06-20 | End: 2021-10-26

## 2021-07-13 ENCOUNTER — MYC MEDICAL ADVICE (OUTPATIENT)
Dept: NEUROSURGERY | Facility: CLINIC | Age: 51
End: 2021-07-13

## 2021-07-20 ENCOUNTER — ANCILLARY PROCEDURE (OUTPATIENT)
Dept: GENERAL RADIOLOGY | Facility: CLINIC | Age: 51
End: 2021-07-20
Attending: ORTHOPAEDIC SURGERY

## 2021-07-20 ENCOUNTER — OFFICE VISIT (OUTPATIENT)
Dept: ORTHOPEDICS | Facility: CLINIC | Age: 51
End: 2021-07-20

## 2021-07-20 VITALS
HEIGHT: 64 IN | BODY MASS INDEX: 23.05 KG/M2 | WEIGHT: 135 LBS | SYSTOLIC BLOOD PRESSURE: 138 MMHG | DIASTOLIC BLOOD PRESSURE: 82 MMHG

## 2021-07-20 DIAGNOSIS — M25.512 CHRONIC LEFT SHOULDER PAIN: ICD-10-CM

## 2021-07-20 DIAGNOSIS — M25.312 INSTABILITY OF LEFT SHOULDER JOINT: Primary | ICD-10-CM

## 2021-07-20 DIAGNOSIS — G89.29 CHRONIC LEFT SHOULDER PAIN: ICD-10-CM

## 2021-07-20 DIAGNOSIS — M75.42 IMPINGEMENT SYNDROME OF LEFT SHOULDER: ICD-10-CM

## 2021-07-20 PROCEDURE — 99204 OFFICE O/P NEW MOD 45 MIN: CPT | Performed by: ORTHOPAEDIC SURGERY

## 2021-07-20 PROCEDURE — 73030 X-RAY EXAM OF SHOULDER: CPT | Mod: TC | Performed by: RADIOLOGY

## 2021-07-20 ASSESSMENT — MIFFLIN-ST. JEOR: SCORE: 1212.36

## 2021-07-20 NOTE — LETTER
7/20/2021         RE: Earlene Mark  [address]  [facility] [address]        Dear Colleague,    Thank you for referring your patient, Earlene Mark, to the [facility]. Please see a copy of my visit note below.    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        Chief Complaints and History of Present Illnesses   Patient presents with     Left Shoulder - Pain       History of Present Illness:     Onset: 6 month(s) ago. Reports insidious onset without acute precipitating event. She reports weakness and popping  And instability with weight lifting overhead motion. She has difficulty sleeping. She reports pain in the anterior lateral shoulder  Worsened by: weight lifting, and overhead motions  Better with: rest  Treatments tried: rest/activity avoidance  Associated symptoms: weakness of shoulder and feeling of instability    Physical Exam:  Patient has full forward flexion and abduction.  She has feelings of instability with external rotation.  Apprehension test positive.  Carson positive.  Impingement positive.  Austin's positive.  Internal rotation to left hip.  Distal motor and sensory examinations grossly intact.  There is tenderness to palpation in the bicipital groove, anterior shoulder, and lateral shoulder.  There is no tenderness palpation in the acromioclavicular joint or the posterior shoulder.      Diagnostic Modalities:    Independent visualization of the images was performed.  I personally interpreted the imaging studies.  Multiple views of the left shoulder show small osteophyte formation on the inferior portion of the humeral head as well as the glenoid.  There is loss of glenohumeral humeral space.    Impression: Left shoulder impingement.    Plan:  All of the above pertinent physical exam and imaging modalities findings was reviewed.  I have a high degree of suspicion  for a labral tear as the patient does appear unstable.  With further discussion she feels like she is constantly trying to readjust her shoulder because it feels like it is trying to slip out of joint.  I advised her that I like to obtain an MRI with contrast of the left shoulder and then see her back.            BP Readings from Last 1 Encounters:   05/06/21 128/65                 Again, thank you for allowing me to participate in the care of your patient.        Sincerely,        Siva Vivas, DO

## 2021-07-20 NOTE — PROGRESS NOTES
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        Chief Complaints and History of Present Illnesses   Patient presents with     Left Shoulder - Pain       History of Present Illness:     Onset: 6 month(s) ago. Reports insidious onset without acute precipitating event. She reports weakness and popping  And instability with weight lifting overhead motion. She has difficulty sleeping. She reports pain in the anterior lateral shoulder  Worsened by: weight lifting, and overhead motions  Better with: rest  Treatments tried: rest/activity avoidance  Associated symptoms: weakness of shoulder and feeling of instability    Physical Exam:  Patient has full forward flexion and abduction.  She has feelings of instability with external rotation.  Apprehension test positive.  Carson positive.  Impingement positive.  Redwood's positive.  Internal rotation to left hip.  Distal motor and sensory examinations grossly intact.  There is tenderness to palpation in the bicipital groove, anterior shoulder, and lateral shoulder.  There is no tenderness palpation in the acromioclavicular joint or the posterior shoulder.      Diagnostic Modalities:    Independent visualization of the images was performed.  I personally interpreted the imaging studies.  Multiple views of the left shoulder show small osteophyte formation on the inferior portion of the humeral head as well as the glenoid.  There is loss of glenohumeral humeral space.    Impression: Left shoulder impingement.    Plan:  All of the above pertinent physical exam and imaging modalities findings was reviewed.  I have a high degree of suspicion for a labral tear as the patient does appear unstable.  With further discussion she feels like she is constantly trying to readjust her shoulder because it feels like it is trying to slip out of joint.  I advised her that I like to obtain an MRI with  contrast of the left shoulder and then see her back.            BP Readings from Last 1 Encounters:   05/06/21 128/65

## 2021-07-28 DIAGNOSIS — R06.2 WHEEZING: ICD-10-CM

## 2021-07-30 NOTE — CONFIDENTIAL NOTE
This was sent to pharmacy on 7/27/21.    Refused Prescriptions:                       Disp   Refills    fluticasone-salmeterol (ADVAIR) 250-50 MCG*1 each 3        Sig: Inhale 1 puff into the lungs every 12 hours  Refused By: JOEY HUMPHRIES  Reason for Refusal: Duplicate  Reason for Refusal Comment: See rx sent 7/27/21 for #1 w/ 3 refills    Joey Humphries RN

## 2021-10-09 ENCOUNTER — LAB (OUTPATIENT)
Dept: LAB | Facility: CLINIC | Age: 51
End: 2021-10-09
Payer: OTHER GOVERNMENT

## 2021-10-09 DIAGNOSIS — Z11.59 SCREENING FOR VIRAL DISEASE: Primary | ICD-10-CM

## 2021-10-09 PROCEDURE — U0003 INFECTIOUS AGENT DETECTION BY NUCLEIC ACID (DNA OR RNA); SEVERE ACUTE RESPIRATORY SYNDROME CORONAVIRUS 2 (SARS-COV-2) (CORONAVIRUS DISEASE [COVID-19]), AMPLIFIED PROBE TECHNIQUE, MAKING USE OF HIGH THROUGHPUT TECHNOLOGIES AS DESCRIBED BY CMS-2020-01-R: HCPCS

## 2021-10-09 PROCEDURE — U0005 INFEC AGEN DETEC AMPLI PROBE: HCPCS

## 2021-10-10 LAB — SARS-COV-2 RNA RESP QL NAA+PROBE: NEGATIVE

## 2021-10-13 ENCOUNTER — HOSPITAL ENCOUNTER (OUTPATIENT)
Dept: MRI IMAGING | Facility: CLINIC | Age: 51
End: 2021-10-13
Attending: ORTHOPAEDIC SURGERY

## 2021-10-13 ENCOUNTER — HOSPITAL ENCOUNTER (OUTPATIENT)
Dept: GENERAL RADIOLOGY | Facility: CLINIC | Age: 51
End: 2021-10-13
Attending: ORTHOPAEDIC SURGERY

## 2021-10-13 DIAGNOSIS — M25.312 INSTABILITY OF LEFT SHOULDER JOINT: ICD-10-CM

## 2021-10-13 PROCEDURE — A9585 GADOBUTROL INJECTION: HCPCS | Performed by: RADIOLOGY

## 2021-10-13 PROCEDURE — 77002 NEEDLE LOCALIZATION BY XRAY: CPT

## 2021-10-13 PROCEDURE — 250N000011 HC RX IP 250 OP 636: Performed by: RADIOLOGY

## 2021-10-13 PROCEDURE — 255N000002 HC RX 255 OP 636: Performed by: RADIOLOGY

## 2021-10-13 PROCEDURE — 73222 MRI JOINT UPR EXTREM W/DYE: CPT | Mod: LT

## 2021-10-13 PROCEDURE — 23350 INJECTION FOR SHOULDER X-RAY: CPT | Mod: LT

## 2021-10-13 PROCEDURE — 96372 THER/PROPH/DIAG INJ SC/IM: CPT | Performed by: RADIOLOGY

## 2021-10-13 PROCEDURE — 250N000009 HC RX 250: Performed by: RADIOLOGY

## 2021-10-13 RX ORDER — LIDOCAINE HYDROCHLORIDE 10 MG/ML
10 INJECTION, SOLUTION EPIDURAL; INFILTRATION; INTRACAUDAL; PERINEURAL ONCE
Status: COMPLETED | OUTPATIENT
Start: 2021-10-13 | End: 2021-10-13

## 2021-10-13 RX ORDER — GADOBUTROL 604.72 MG/ML
0.1 INJECTION INTRAVENOUS ONCE
Status: COMPLETED | OUTPATIENT
Start: 2021-10-13 | End: 2021-10-13

## 2021-10-13 RX ORDER — IOPAMIDOL 510 MG/ML
100 INJECTION, SOLUTION INTRAVASCULAR ONCE
Status: COMPLETED | OUTPATIENT
Start: 2021-10-13 | End: 2021-10-13

## 2021-10-13 RX ORDER — BUPIVACAINE HYDROCHLORIDE 2.5 MG/ML
10 INJECTION, SOLUTION EPIDURAL; INFILTRATION; INTRACAUDAL ONCE
Status: COMPLETED | OUTPATIENT
Start: 2021-10-13 | End: 2021-10-13

## 2021-10-13 RX ORDER — EPINEPHRINE 1 MG/ML
0.1 INJECTION, SOLUTION, CONCENTRATE INTRAVENOUS ONCE
Status: COMPLETED | OUTPATIENT
Start: 2021-10-13 | End: 2021-10-13

## 2021-10-13 RX ADMIN — GADOBUTROL 0.05 ML: 604.72 INJECTION INTRAVENOUS at 10:00

## 2021-10-13 RX ADMIN — IOPAMIDOL 3 ML: 510 INJECTION, SOLUTION INTRAVASCULAR at 09:52

## 2021-10-13 RX ADMIN — BUPIVACAINE HYDROCHLORIDE 4 MG: 2.5 INJECTION, SOLUTION EPIDURAL; INFILTRATION; INTRACAUDAL; PERINEURAL at 10:18

## 2021-10-13 RX ADMIN — LIDOCAINE HYDROCHLORIDE 3 ML: 10 INJECTION, SOLUTION INFILTRATION; PERINEURAL at 10:11

## 2021-10-13 RX ADMIN — EPINEPHRINE 1 ML: 1 INJECTION INTRAMUSCULAR; INTRAVENOUS; SUBCUTANEOUS at 10:05

## 2021-10-15 ENCOUNTER — OFFICE VISIT (OUTPATIENT)
Dept: ORTHOPEDICS | Facility: CLINIC | Age: 51
End: 2021-10-15

## 2021-10-15 VITALS
WEIGHT: 135 LBS | HEIGHT: 64 IN | SYSTOLIC BLOOD PRESSURE: 132 MMHG | DIASTOLIC BLOOD PRESSURE: 80 MMHG | BODY MASS INDEX: 23.05 KG/M2

## 2021-10-15 DIAGNOSIS — M25.312 INSTABILITY OF LEFT SHOULDER JOINT: Primary | ICD-10-CM

## 2021-10-15 DIAGNOSIS — M25.512 CHRONIC LEFT SHOULDER PAIN: ICD-10-CM

## 2021-10-15 DIAGNOSIS — M75.42 IMPINGEMENT SYNDROME OF LEFT SHOULDER: ICD-10-CM

## 2021-10-15 DIAGNOSIS — G89.29 CHRONIC LEFT SHOULDER PAIN: ICD-10-CM

## 2021-10-15 PROCEDURE — 99213 OFFICE O/P EST LOW 20 MIN: CPT | Performed by: ORTHOPAEDIC SURGERY

## 2021-10-15 ASSESSMENT — MIFFLIN-ST. JEOR: SCORE: 1212.36

## 2021-10-15 NOTE — LETTER
10/15/2021         RE: Earlene Mark  [address]  [place] [facility] [address]        Dear Colleague,    Thank you for referring your patient, Earlene Mark, to the [facility]. Please see a copy of my visit note below.    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 She reports no improvement in her symptoms. She continues to reports mild feelings of instability especially with overhead movement and lifting.  Location of Pain: left shoulder  Worsened by: lifting over head   Better with: rest   Treatments tried: rest/activity avoidance  Associated symptoms: weakness of shoulder and feeling of instability    Physical Exam:  Physical examination left shoulder continues to show a positive Collin.    Apprehension test positive.  Carson positive.  Impingement positive.   Internal rotation to left hip.  Distal motor and sensory examinations grossly intact.  There is tenderness to palpation in the bicipital groove, anterior shoulder, and lateral shoulder.  There is no tenderness palpation in the acromioclavicular joint or the posterior shoulder.    Diagnostic Modalities:    Independent visualization of the images was performed.  I personally interpreted the imaging studies.  Arthrogram shows some tendinosis without any evidence of labral rotator cuff tears.    Impression: Left rotator cuff tendinitis    Plan:  All of the above pertinent physical exam and imaging modalities findings was reviewed.  Because the patient still has feelings of instability that are only associated with pain I am reluctant to do an injection which would be for generalized  inflammatory response.  I would like her to do physical therapy for 6 weeks.  If she is not better at that point in time I would most likely refer her for a second opinion.            BP Readings from Last 1 Encounters:   07/20/21 138/82                 Again, thank you for allowing me to participate in the care of your patient.        Sincerely,        Siva Vivas, DO

## 2021-10-15 NOTE — PROGRESS NOTES
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 She reports no improvement in her symptoms. She continues to reports mild feelings of instability especially with overhead movement and lifting.  Location of Pain: left shoulder  Worsened by: lifting over head   Better with: rest   Treatments tried: rest/activity avoidance  Associated symptoms: weakness of shoulder and feeling of instability    Physical Exam:  Physical examination left shoulder continues to show a positive Richardson.    Apprehension test positive.  Carson positive.  Impingement positive.   Internal rotation to left hip.  Distal motor and sensory examinations grossly intact.  There is tenderness to palpation in the bicipital groove, anterior shoulder, and lateral shoulder.  There is no tenderness palpation in the acromioclavicular joint or the posterior shoulder.    Diagnostic Modalities:    Independent visualization of the images was performed.  I personally interpreted the imaging studies.  Arthrogram shows some tendinosis without any evidence of labral rotator cuff tears.    Impression: Left rotator cuff tendinitis    Plan:  All of the above pertinent physical exam and imaging modalities findings was reviewed.  Because the patient still has feelings of instability that are only associated with pain I am reluctant to do an injection which would be for generalized inflammatory response.  I would like her to do physical therapy for 6 weeks.  If she is not better at that point in time I would most likely refer her for a second opinion.            BP Readings from Last 1 Encounters:   07/20/21 138/82

## 2021-10-21 ENCOUNTER — HOSPITAL ENCOUNTER (OUTPATIENT)
Dept: PHYSICAL THERAPY | Facility: CLINIC | Age: 51
Setting detail: THERAPIES SERIES
End: 2021-10-21
Attending: ORTHOPAEDIC SURGERY

## 2021-10-21 DIAGNOSIS — M25.312 INSTABILITY OF LEFT SHOULDER JOINT: ICD-10-CM

## 2021-10-21 DIAGNOSIS — M25.512 CHRONIC LEFT SHOULDER PAIN: ICD-10-CM

## 2021-10-21 DIAGNOSIS — G89.29 CHRONIC LEFT SHOULDER PAIN: ICD-10-CM

## 2021-10-21 DIAGNOSIS — M75.42 IMPINGEMENT SYNDROME OF LEFT SHOULDER: ICD-10-CM

## 2021-10-21 PROCEDURE — 97112 NEUROMUSCULAR REEDUCATION: CPT | Mod: GP | Performed by: PHYSICAL THERAPIST

## 2021-10-21 PROCEDURE — 97161 PT EVAL LOW COMPLEX 20 MIN: CPT | Mod: GP | Performed by: PHYSICAL THERAPIST

## 2021-10-21 NOTE — PROGRESS NOTES
"   10/21/21 1502   General Information   Type of Visit Initial OP Ortho PT Evaluation   Start of Care Date 10/21/21   Referring Physician Antolin Vivas DO   Patient/Family Goals Statement function and avoid pain. weight lifting with arms   Orders Evaluate and Treat   Date of Order 10/15/21   Certification Required?   (no coverage per ICT)   Medical Diagnosis Instability of left shoulder joint M25.312,Impingement syndrome of left shoulder M75.42, Chronic left shoulder pain M25.512, G89.29    Surgical/Medical history reviewed Yes   Precautions/Limitations no known precautions/limitations   Weight-Bearing Status - LUE full weight-bearing   Weight-Bearing Status - RUE full weight-bearing   Weight-Bearing Status - LLE full weight-bearing   Weight-Bearing Status - RLE full weight-bearing   General Information Comments HIstory: s/p Hysterectomy, cervical pathology benign, migraines, Chronic idiopathic urticaria, wheezing, insomnia       Present No   Body Part(s)   Body Part(s) Shoulder   Presentation and Etiology   Pertinent history of current problem (include personal factors and/or comorbidities that impact the POC) 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 Thinks \"wear and tear\" as she enjoys exercising. She likes weight training - cross fit, using free weights and machine. NOw using new TM and working out in garage. Feels came on after mother passed. Started working our and aggravating with pect deck. Cannot lift covers with L arm when laying on her R side. She can be sitting on couch and her L shoulder feels like it falls out of place and she cannot get it back into place. Had cortisone injection in neck. R sided sleeper -    Impairments A. Pain;F. Decreased strength and endurance;E. Decreased flexibility;D. Decreased ROM   Functional Limitations perform activities of daily living;perform required work activities;perform desired leisure / sports activities   Symptom Location " ALong L upper trap and into the 3-4 fingers. CLicks and pops and can hurt, migraines at suboccipital   How/Where did it occur From insidious onset;With repetition/overuse   Onset date of current episode/exacerbation 01/21/21  (approximately)   Chronicity Chronic   Pain rating (0-10 point scale) Other   Pain rating comment Now: 6/10, range: 6/10 to 9/10   Pain quality H. Other   Pain quality comment stabbing through mid deltoid. Numbness in hand is gone, annoying, fatiguing    Frequency of pain/symptoms A. Constant   Pain/symptoms are: Worse in the morning  (neck every morning; no effect with shoulder. )   Pain/symptoms exacerbated by K. Home tasks;M. Other   Pain exacerbation comment lifting abduction/flexion, pushing pulling, dressing, IR behind back to towel off in shower, lifting, carrying   Pain/symptoms eased by K. Other   Pain eased by comment limited ibuprofen but edge off, rest, ice   Progression of symptoms since onset: Worsened  (not able to function as well)   Current / Previous Interventions   Diagnostic Tests: MRI   MRI Results Results   MRI results Mild supraspinatus tendinosis, Glenohumeral chondromalacia.  - see MRI report   Prior Level of Function   Prior Level of Function-Mobility Decreased L shoulder since Jan 2021 otherwise active and weight lifting regularly   Prior Level of Function-ADLs independent until Jan 2021   Functional Level Prior Comment independent and caring for family    Current Level of Function   Current Community Support   ( is very supportive)   Patient role/employment history H. 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 No   Have you fallen and had an injury in the past year? No   Is patient a fall risk? No   Abuse Screen (yes response referral indicated)   Feels  "Unsafe at Home or Work/School no   Feels Threatened by Someone no   Does Anyone Try to Keep You From Having Contact with Others or Doing Things Outside Your Home? no   Physical Signs of Abuse Present no   Functional Scales   Functional Scales Other   Other Scales  SPADI: 75-13-57.69 - we discussed results   Shoulder Objective Findings   Observation Protection of L shoulder with upper trap muscles active, L shoulder forward compared to the R   Integumentary  Negative   Posture L shoulder elevation, slight cervical protraction    Shoulder ROM Comment Decreased L shoulder ER at 90 degrees (L:90 degrees with R 100 degrees), shoulder elevation and IR reduced due to pain noting \"numbness\" in hands with shoulder abduction with hold   Scapulothoracic Rhythm poor bilaterally with L more affected than R - poor ability to depress and retract with atrophy of the lower trap and rhomboid area. Tends to engage the upper trap and middle trap L with retraction and with depression   Shoulder Special Tests Comments Shoulder elevation in scapular plane L increased L hand \"numbness\" and this subsided once arm at side. ABle to complete 100+ degrees elevation in the scapular plane with PT assisting her L scapula without numbness being reported.    Palpation tightness of the L upper trap and the SCM compared to the R, palpation of the suboccipital muscles \"felt good\"   Planned Therapy Interventions   Planned Therapy Interventions manual therapy;joint mobilization;neuromuscular re-education;strengthening   Planned Therapy Interventions Comment complete AROM and strength test   Planned Modality Interventions   Planned Modality Interventions Comments as needed for calming symptoms   Clinical Impression   Criteria for Skilled Therapeutic Interventions Met yes, treatment indicated   PT Diagnosis Poor scapulo humeral rhythm with feeling of instability, Cervical dysfunction - lower    Influenced by the following impairments chronic, lost mother " (like a best friend) in Jan 2021, poor sleep quality   Functional limitations due to impairments general ADLs, lifting, carrying, sleeping, reaching   Clinical Presentation Stable/Uncomplicated   Clinical Presentation Rationale based on clinical assessment so far   Clinical Decision Making (Complexity) Low complexity   Predicted Duration of Therapy Intervention (days/wks) 2 x per week x 2 weeks and then decrease to 1 time per week x 2-3 weeks    Risk & Benefits of therapy have been explained Yes   Patient, Family & other staff in agreement with plan of care Yes   Clinical Impression Comments Earlene has had L shoulder pain since Jan 2021. She has een slowly losing function due to pain. In addition, she has neck symptoms that have been affecting the C7-8 level of L hand. This was reproducible with shoulder abduction. Upper traps are overactive and she has atrophy appearance of the lower traps and rhomboids. Stabilizing the scapula with hands or tape reduced symptoms allowing her to lift higher without symptoms. We did discuss temporary use of tape or consider a shoulder stabilizing brace. There is a neck component to her symptoms and plan to check the scalene tightnesses.    Education Assessment   Preferred Learning Style Listening;Reading;Demonstration;Pictures/video   Barriers to Learning No barriers   ORTHO GOALS   PT Ortho Eval Goals 1;2   Ortho Goal 1   Goal Identifier Sleep   Goal Description Earlene will be able to sleep on her R side and independently pull up her covers with her L hand without increased symptoms   Target Date 11/26/21   Ortho Goal 2   Goal Identifier Lifting   Goal Description Earlene will be able to progress back into weight lifting x 5# to start with good technique and without increased symptoms   Target Date 12/02/21   Total Evaluation Time   PT Jayy Low Complexity Minutes (75574) 35

## 2021-10-23 ENCOUNTER — HEALTH MAINTENANCE LETTER (OUTPATIENT)
Age: 51
End: 2021-10-23

## 2021-10-26 DIAGNOSIS — G43.009 MIGRAINE WITHOUT AURA AND WITHOUT STATUS MIGRAINOSUS, NOT INTRACTABLE: ICD-10-CM

## 2021-10-26 RX ORDER — RIZATRIPTAN BENZOATE 10 MG/1
TABLET ORAL
Qty: 54 TABLET | Refills: 3 | Status: SHIPPED | OUTPATIENT
Start: 2021-10-26 | End: 2022-02-26

## 2021-10-26 NOTE — TELEPHONE ENCOUNTER
Requested Prescriptions   Pending Prescriptions Disp Refills     rizatriptan (MAXALT) 10 MG tablet 54 tablet 3     Sig: Take 1/2 to 1 tablet by mouth at onset of migraine, may repeat in 2 hours -Max 3 tablet(s) in 24 hours       There is no refill protocol information for this order        Last Written Prescription Date:  6/20/2021  Last Fill Quantity: 54,  # refills: 3   Last office visit: Visit date not found with prescribing provider:     Future Office Visit:

## 2021-10-28 ENCOUNTER — HOSPITAL ENCOUNTER (OUTPATIENT)
Dept: PHYSICAL THERAPY | Facility: CLINIC | Age: 51
Setting detail: THERAPIES SERIES
End: 2021-10-28
Attending: ORTHOPAEDIC SURGERY

## 2021-10-28 PROCEDURE — 97112 NEUROMUSCULAR REEDUCATION: CPT | Mod: GP | Performed by: PHYSICAL THERAPIST

## 2021-11-01 ENCOUNTER — HOSPITAL ENCOUNTER (OUTPATIENT)
Dept: PHYSICAL THERAPY | Facility: CLINIC | Age: 51
Setting detail: THERAPIES SERIES
End: 2021-11-01
Attending: ORTHOPAEDIC SURGERY

## 2021-11-01 PROCEDURE — 97112 NEUROMUSCULAR REEDUCATION: CPT | Mod: GP | Performed by: PHYSICAL THERAPIST

## 2021-11-04 ENCOUNTER — HOSPITAL ENCOUNTER (OUTPATIENT)
Dept: PHYSICAL THERAPY | Facility: CLINIC | Age: 51
Setting detail: THERAPIES SERIES
End: 2021-11-04
Attending: ORTHOPAEDIC SURGERY

## 2021-11-04 PROCEDURE — 97110 THERAPEUTIC EXERCISES: CPT | Mod: GP | Performed by: PHYSICAL THERAPIST

## 2021-11-17 ENCOUNTER — MYC MEDICAL ADVICE (OUTPATIENT)
Dept: FAMILY MEDICINE | Facility: CLINIC | Age: 51
End: 2021-11-17

## 2021-11-29 DIAGNOSIS — Z11.59 ENCOUNTER FOR SCREENING FOR OTHER VIRAL DISEASES: ICD-10-CM

## 2021-11-30 DIAGNOSIS — R53.83 FATIGUE: ICD-10-CM

## 2021-11-30 DIAGNOSIS — R68.82 DECREASED SEX DRIVE: ICD-10-CM

## 2021-11-30 DIAGNOSIS — G47.09 OTHER INSOMNIA: ICD-10-CM

## 2021-11-30 DIAGNOSIS — E55.9 AVITAMINOSIS D: Primary | ICD-10-CM

## 2021-12-02 ENCOUNTER — TELEPHONE (OUTPATIENT)
Dept: GENERAL RADIOLOGY | Facility: CLINIC | Age: 51
End: 2021-12-02

## 2021-12-02 ENCOUNTER — TELEPHONE (OUTPATIENT)
Dept: PHYSICAL THERAPY | Facility: CLINIC | Age: 51
End: 2021-12-02

## 2021-12-02 NOTE — DISCHARGE SUMMARY
[facility] Rehabilitation Service    Outpatient Physical Therapy Discharge Note  Patient: Earlene Mark  : 1970    Beginning/End Dates of Reporting Period:  10- through 2021 for a total of 4 sessions.     Referring Provider: Antolin Vivas DO    Therapy Diagnosis: Poor scapulo humeral rhythm with feeling of instability, Cervical dysfunction lower neck     Client Self Report: At the time of her last appt:  Completed scapular positioning since last session. She has also been working on meditation at night with her . She continues to note symptoms in L shoulder when rolling onto R side.     Earlene reported today by phone that her shoulder was better and she met goal of pulling sheet over her at night with her L arm. She continues to have neck pain and will be getting an injection for this in the near future. She feels her shoulder is good and her PT chart can be closed.     Objective Measurements:  Shoulder AROM last session was full      Goals:  Goal Identifier Sleep   Goal Description Earlene will be able to sleep on her R side and independently pull up her covers with her L hand without increased symptoms   Target Date 21   Date Met   2021   Progress (detail required for progress note):       Goal Identifier Lifting   Goal Description Earlene will be able to progress back into weight lifting x 5# to start with good technique and without increased symptoms   Target Date 21   Date Met      Progress (detail required for progress note):  Progressing but limited due to neck issues.      Goal Identifier Core   Goal Description Earlene will be able to position her pelvis and low back using core muscles so she can return to weight lifting and other activities with good core control/stability   Target Date 21   Date Met      Progress (detail required for progress note):  Limited due to neck issues.       Plan:  Discharge from therapy.    Discharge:    Reason for Discharge: shoulder is better and neck is still an issue    Equipment Issued: band    Discharge Plan: Patient to continue home program. Will have cervical injection.

## 2021-12-06 ENCOUNTER — LAB (OUTPATIENT)
Dept: LAB | Facility: OTHER | Age: 51
End: 2021-12-06
Payer: OTHER GOVERNMENT

## 2021-12-06 DIAGNOSIS — E55.9 AVITAMINOSIS D: ICD-10-CM

## 2021-12-06 DIAGNOSIS — R53.83 FATIGUE: ICD-10-CM

## 2021-12-06 DIAGNOSIS — G47.09 OTHER INSOMNIA: ICD-10-CM

## 2021-12-06 DIAGNOSIS — Z11.59 ENCOUNTER FOR SCREENING FOR OTHER VIRAL DISEASES: ICD-10-CM

## 2021-12-06 DIAGNOSIS — R68.82 DECREASED SEX DRIVE: ICD-10-CM

## 2021-12-06 LAB
ESTRADIOL SERPL-MCNC: 17 PG/ML
IRON SATN MFR SERPL: 24 % (ref 15–46)
IRON SERPL-MCNC: 63 UG/DL (ref 35–180)
PROGEST SERPL-MCNC: 14.2 NG/ML
TIBC SERPL-MCNC: 268 UG/DL (ref 240–430)

## 2021-12-06 PROCEDURE — 84403 ASSAY OF TOTAL TESTOSTERONE: CPT

## 2021-12-06 PROCEDURE — 82670 ASSAY OF TOTAL ESTRADIOL: CPT

## 2021-12-06 PROCEDURE — 82306 VITAMIN D 25 HYDROXY: CPT

## 2021-12-06 PROCEDURE — 82627 DEHYDROEPIANDROSTERONE: CPT

## 2021-12-06 PROCEDURE — U0003 INFECTIOUS AGENT DETECTION BY NUCLEIC ACID (DNA OR RNA); SEVERE ACUTE RESPIRATORY SYNDROME CORONAVIRUS 2 (SARS-COV-2) (CORONAVIRUS DISEASE [COVID-19]), AMPLIFIED PROBE TECHNIQUE, MAKING USE OF HIGH THROUGHPUT TECHNOLOGIES AS DESCRIBED BY CMS-2020-01-R: HCPCS

## 2021-12-06 PROCEDURE — 84144 ASSAY OF PROGESTERONE: CPT

## 2021-12-06 PROCEDURE — 83550 IRON BINDING TEST: CPT

## 2021-12-06 PROCEDURE — 36415 COLL VENOUS BLD VENIPUNCTURE: CPT

## 2021-12-06 PROCEDURE — U0005 INFEC AGEN DETEC AMPLI PROBE: HCPCS

## 2021-12-07 LAB
DEPRECATED CALCIDIOL+CALCIFEROL SERPL-MC: 66 UG/L (ref 20–75)
DHEA-S SERPL-MCNC: 84 UG/DL (ref 35–430)
SARS-COV-2 RNA RESP QL NAA+PROBE: NEGATIVE

## 2021-12-08 ENCOUNTER — NURSE TRIAGE (OUTPATIENT)
Dept: NURSING | Facility: CLINIC | Age: 51
End: 2021-12-08

## 2021-12-08 ENCOUNTER — HOSPITAL ENCOUNTER (OUTPATIENT)
Dept: GENERAL RADIOLOGY | Facility: CLINIC | Age: 51
Discharge: HOME | End: 2021-12-08
Attending: PHYSICIAN ASSISTANT | Admitting: PHYSICIAN ASSISTANT

## 2021-12-08 VITALS — SYSTOLIC BLOOD PRESSURE: 117 MMHG | HEART RATE: 54 BPM | DIASTOLIC BLOOD PRESSURE: 69 MMHG

## 2021-12-08 DIAGNOSIS — M54.12 CERVICAL RADICULOPATHY: ICD-10-CM

## 2021-12-08 LAB — TESTOST SERPL-MCNC: 56 NG/DL (ref 8–60)

## 2021-12-08 PROCEDURE — 250N000009 HC RX 250

## 2021-12-08 PROCEDURE — 62321 NJX INTERLAMINAR CRV/THRC: CPT

## 2021-12-08 PROCEDURE — 250N000011 HC RX IP 250 OP 636

## 2021-12-08 PROCEDURE — 255N000002 HC RX 255 OP 636: Performed by: PHYSICIAN ASSISTANT

## 2021-12-08 RX ORDER — BETAMETHASONE SODIUM PHOSPHATE AND BETAMETHASONE ACETATE 3; 3 MG/ML; MG/ML
INJECTION, SUSPENSION INTRA-ARTICULAR; INTRALESIONAL; INTRAMUSCULAR; SOFT TISSUE
Status: COMPLETED
Start: 2021-12-08 | End: 2021-12-08

## 2021-12-08 RX ORDER — IOPAMIDOL 408 MG/ML
10 INJECTION, SOLUTION INTRATHECAL ONCE
Status: COMPLETED | OUTPATIENT
Start: 2021-12-08 | End: 2021-12-08

## 2021-12-08 RX ORDER — LIDOCAINE HYDROCHLORIDE 10 MG/ML
5 INJECTION, SOLUTION EPIDURAL; INFILTRATION; INTRACAUDAL; PERINEURAL ONCE
Status: COMPLETED | OUTPATIENT
Start: 2021-12-08 | End: 2021-12-08

## 2021-12-08 RX ORDER — LIDOCAINE HYDROCHLORIDE 10 MG/ML
INJECTION, SOLUTION EPIDURAL; INFILTRATION; INTRACAUDAL; PERINEURAL
Status: COMPLETED
Start: 2021-12-08 | End: 2021-12-08

## 2021-12-08 RX ORDER — BETAMETHASONE SODIUM PHOSPHATE AND BETAMETHASONE ACETATE 3; 3 MG/ML; MG/ML
18 INJECTION, SUSPENSION INTRA-ARTICULAR; INTRALESIONAL; INTRAMUSCULAR; SOFT TISSUE ONCE
Status: COMPLETED | OUTPATIENT
Start: 2021-12-08 | End: 2021-12-08

## 2021-12-08 RX ADMIN — BETAMETHASONE SODIUM PHOSPHATE AND BETAMETHASONE ACETATE 18 MG: 3; 3 INJECTION, SUSPENSION INTRA-ARTICULAR; INTRALESIONAL; INTRAMUSCULAR; SOFT TISSUE at 12:03

## 2021-12-08 RX ADMIN — LIDOCAINE HYDROCHLORIDE 1 ML: 10 INJECTION, SOLUTION EPIDURAL; INFILTRATION; INTRACAUDAL; PERINEURAL at 12:04

## 2021-12-08 RX ADMIN — BETAMETHASONE SODIUM PHOSPHATE AND BETAMETHASONE ACETATE 18 MG: 3; 3 INJECTION, SUSPENSION INTRA-ARTICULAR; INTRALESIONAL; INTRAMUSCULAR at 12:03

## 2021-12-08 RX ADMIN — IOPAMIDOL 2 ML: 408 INJECTION, SOLUTION INTRATHECAL at 12:03

## 2021-12-08 NOTE — PROGRESS NOTES
Pt was in Radiology today for a Cervical Epidural steroid injection. Pt tolerated ortho procedure well. Pre procedure pain was 7 post procedure pain level 0.Procedure was completed by KEVIN ANGUIANO. There were no complications during this procedure. Pt verbalized understanding of written and verbal instructions and left department in stable and satisfactory condition. There is no evidence of bleeding or any other complications upon discharge.

## 2021-12-08 NOTE — PROCEDURES
[facility]    Procedure: Cervical JAZZY    Date/Time: 12/8/2021 12:01 PM  Performed by: Isabella Ritter PA-C  Authorized by: Isabella Ritter PA-C       UNIVERSAL PROTOCOL   Site Marked: Yes  Prior Images Obtained and Reviewed:  Yes  Required items: Required blood products, implants, devices and special equipment available    Patient identity confirmed:  Verbally with patient  NA - No sedation, light sedation, or local anesthesia  Confirmation Checklist:  Patient's identity using two indicators, relevant allergies, procedure was appropriate and matched the consent or emergent situation and correct equipment/implants were available  Time out: Immediately prior to the procedure a time out was called    Universal Protocol: the Joint Commission Universal Protocol was followed    Preparation: Patient was prepped and draped in usual sterile fashion       ANESTHESIA    Anesthesia: Local infiltration  Local Anesthetic:  Lidocaine 1% without epinephrine  Anesthetic Total (mL):  2      SEDATION    Patient Sedated: No    See dictated procedure note for full details.    PROCEDURE    Patient Tolerance:  Patient tolerated the procedure well with no immediate complications  Length of time physician/provider present for 1:1 monitoring during sedation: 0

## 2021-12-09 ENCOUNTER — TELEPHONE (OUTPATIENT)
Dept: NEUROSURGERY | Facility: CLINIC | Age: 51
End: 2021-12-09

## 2021-12-09 NOTE — TELEPHONE ENCOUNTER
"\"I had an epidural earlier today in [place]. It was ordered by Dr. Siva Vivas. They didn't give me any paper work or anything. I am very dizzy, headache ,nausea and the dizziness got worse after I took my levocetirizine (XYZAL) 5 MG tablet.\"  Denies other sx   Advised some of these sx can be normal, but due to the severity of the dizziness, offered to page on call for ortho MD  Paged on call Dr. Elder at 8:48 through page op to call pt at [phone].  Call back if needed  Mary Jo RN [facility]      "

## 2021-12-09 NOTE — CONFIDENTIAL NOTE
"Patient called regarding an epidural she had yesterday, she said everything about it was odd and the entire experience has left her very concerned and has some symptoms that are abnormal.     Called patient to discuss. She reports her dizziness and nausea has gotten much better.  Per the patient, she went to [facility] to the third floor where she has had her injections in the past. She says they did not have record of an appointment for her there and was told to visit the first floor. She went to the first floor and was brought to what she compared to an \"broom closet\". Per the patient, there was discussion with the person conducting the procedure that they used a higher dose of medication as opposed to up on 3rd floor. She could not remember what medication but said it was 6 instead of 3.   Unsure of importance of this.   Then she reports that immediately upon completion of the procedure she was told she could leave and was not given paperwork detailing discharge instructions.   She had severe dizziness and nausea later and phoned a nurse line as she reports she did not receive paperwork detailing who she should call with any reactions.     Per Leonidas Hong: \"Called pt and left detailed message. Recommend C6-7 epidural steroid injection. Will place orders\"   Multiple communications regarding scheduling the JAZZY trying to get patient in to see different providers the soonest.     Per kirsty conversation with Antolin Vivas MD, \"Set up another appointment with neurosurgery if you are still having neck issues.  They may recommend other injections first\"    Then the patient did call \"general\" scheduling to schedule an injection and they had an active order for an injection an then this was scheduled.     She asks if there are standing orders for injections.    She is wondering what doctor ordered the injection and who to follow up with this.     She was given the number for patient relations  [phone]              "

## 2021-12-09 NOTE — TELEPHONE ENCOUNTER
Reason for Disposition    SEVERE dizziness (e.g., unable to stand, requires support to walk, feels like passing out now)    Protocols used: DIZZINESS - [id number]

## 2021-12-09 NOTE — TELEPHONE ENCOUNTER
Reason for call: Pt would like a call back regarding an epidural she had yesterday, she said everything about it was odd and the entire experience has left her very concerned and has some symptoms that are abnormal.

## 2022-02-12 ENCOUNTER — HEALTH MAINTENANCE LETTER (OUTPATIENT)
Age: 52
End: 2022-02-12

## 2022-02-24 ENCOUNTER — MYC MEDICAL ADVICE (OUTPATIENT)
Dept: FAMILY MEDICINE | Facility: CLINIC | Age: 52
End: 2022-02-24

## 2022-02-24 DIAGNOSIS — G43.009 MIGRAINE WITHOUT AURA AND WITHOUT STATUS MIGRAINOSUS, NOT INTRACTABLE: ICD-10-CM

## 2022-02-24 NOTE — TELEPHONE ENCOUNTER
Routing refill request to provider for review/approval because:  Drug not on the [facility] refill protocol   Patient needs to be seen because it has been more than 1 year since last office visit.      Jack Rivera RN

## 2022-02-26 RX ORDER — RIZATRIPTAN BENZOATE 10 MG/1
TABLET ORAL
Qty: 54 TABLET | Refills: 3 | Status: SHIPPED | OUTPATIENT
Start: 2022-02-26 | End: 2022-07-18

## 2022-04-13 DIAGNOSIS — L50.1 CHRONIC IDIOPATHIC URTICARIA: ICD-10-CM

## 2022-04-14 ENCOUNTER — TELEPHONE (OUTPATIENT)
Dept: NEUROSURGERY | Facility: CLINIC | Age: 52
End: 2022-04-14

## 2022-04-14 DIAGNOSIS — G47.09 OTHER INSOMNIA: ICD-10-CM

## 2022-04-14 RX ORDER — LEVOCETIRIZINE DIHYDROCHLORIDE 5 MG/1
5 TABLET, FILM COATED ORAL 2 TIMES DAILY
Qty: 60 TABLET | Refills: 0 | Status: SHIPPED | OUTPATIENT
Start: 2022-04-14

## 2022-04-14 RX ORDER — TRAZODONE HYDROCHLORIDE 50 MG/1
TABLET, FILM COATED ORAL
Qty: 180 TABLET | Refills: 3 | Status: SHIPPED | OUTPATIENT
Start: 2022-04-14 | End: 2022-05-02

## 2022-04-14 NOTE — TELEPHONE ENCOUNTER
Due for an annual exam and medication follow up.  Please schedule.  It has been almost 2 years since she has been seen in the office.     Electronically signed by:  Kushal Linda M.D.  4/14/2022

## 2022-04-14 NOTE — TELEPHONE ENCOUNTER
Pending Prescriptions:                       Disp   Refills    levocetirizine (XYZAL) 5 MG tablet         60 tab*11       Sig: Take 1 tablet (5 mg) by mouth 2 times daily    Routing refill request to provider for review/approval because:  Patient needs to be seen because it has been more than 1 year since last office visit.  Prescription is over 13 months old.    levocetirizine (XYZAL) 5 MG tablet 60 tablet 11 3/10/2021  No   Sig - Route: Take 1 tablet (5 mg) by mouth 2 times daily - Oral   Sent to pharmacy as: Levocetirizine Dihydrochloride 5 MG Oral Tablet (XYZAL)   Class: E-Prescribe   Order: [id number]   E-Prescribing Status: Receipt confirmed by pharmacy (3/10/2021  3:39 PM CST)     Carly Hills RN on 4/14/2022 at 2:41 PM

## 2022-04-14 NOTE — TELEPHONE ENCOUNTER
Attempted to reach out to patient, no answer. Left voice message for patient to call clinic back to further discuss.     Last Visit: 12/8/21 virtual visit    Next Visit: 5/2/22    Name of Provider:  Leonidas Hong PA-C    Assessment: She states her upper back and arms are numb and worse in the last week.  Patient has an in person visit on 5/2/22 but is asking what she can do in the mean time.   History: C7-T1 interlaminar epidural steroid injection x2    Recommend sooner appointment if patient willing to travel to [place]/[facility] if/when patient calls back

## 2022-04-14 NOTE — TELEPHONE ENCOUNTER
Reason for Call:  Other call back    Detailed comments: Patient's last visit was 1 year and a phone visit.  She states her upper back and arms are numb and worse in the last week.  Patient has an in person visit on 5/2/22 but is asking what she can do in the mean time.  Can this visit be virtual?  Please call    Phone Number Patient can be reached at: Home number on file [phone] (home) or Cell number on file:    Telephone Information:   Mobile [phone]       Best Time: any    Can we leave a detailed message on this number? YES    Call taken on 4/14/2022 at 2:03 PM by Kamila Horne

## 2022-04-15 ENCOUNTER — MYC MEDICAL ADVICE (OUTPATIENT)
Dept: FAMILY MEDICINE | Facility: CLINIC | Age: 52
End: 2022-04-15

## 2022-04-15 NOTE — TELEPHONE ENCOUNTER
One month refill provided.  The patient needs to be seen for further refills.    Jose David Bueno MD

## 2022-04-15 NOTE — LETTER
[address] [address]  Phone: [phone]  Fax: [phone]    April 21, 2022      Earlene Mark                                                                                                                                [address] [facility] [address]      Dear Ms. Mark,    We are concerned about your health care.  We recently provided you with a medication refill.  Many medications require routine follow-up with your Doctor.       At this time we ask that: You schedule an appointment for your annual physical and medication recheck. It has been over 2 years since your last visit. Call the clinic at [phone] Option 1 to schedule.      Your prescription:  ( Trazodone) Has been refilled for 1 month so you may have time for the above noted follow-up.        Thank you,     Kushal Linda MD  Care Team

## 2022-04-17 ENCOUNTER — NURSE TRIAGE (OUTPATIENT)
Dept: NURSING | Facility: CLINIC | Age: 52
End: 2022-04-17

## 2022-04-17 NOTE — TELEPHONE ENCOUNTER
"Nurse Triage SBAR    Is this a 2nd Level Triage? NO    Situation: Spouse/patient are calling with concern of ongoing neck/L arm pain. Patient is laying on floor in pain.  CTC to speak with spouse verbally obtained from the patient .    Background: History of steroid injections in her neck; spouse states patient is a surgical candidate. She has an appointment for injection 5/2/22.     Relayed information from [facility] on 4/14/22. Provided Ortho Scheduling number for patient to check availability, [phone].    Assessment:   Denies CP, SOB  Range of motion limited in neck  Strong headache  Weakness in left arm, has happened in the past with this pain  Has tried hot and cold  Not able to do anything to diminish the pain    Recommendation: Per dispo, Go to ED now.  Advised patient to call back with any new or worsening symptoms. Patient verbalized understanding and agrees with plan.    Protocol Recommended Disposition: Emergency department    Rebeca Kraus RN on 4/17/2022 at 9:36 AM    Reason for Disposition    Weakness of an arm or hand    Additional Information    Negative: Followed a neck injury (e.g., MVA, sports, impact or collision)    Negative: Shock suspected (e.g., cold/pale/clammy skin, too weak to stand, low BP, rapid pulse)    Negative: Difficult to awaken or acting confused (e.g., disoriented, slurred speech)    Negative: [1] Similar pain previously AND [2] it was from \"heart attack\"    Negative: [1] Similar pain previously AND [2] it was from \"angina\" AND [3] not relieved by nitroglycerin    Negative: Sounds like a life-threatening emergency to the triager    Negative: Chest pain    Negative: Lymph node in the neck is swollen or painful to the touch    Negative: Sore throat is main symptom    Negative: Difficulty breathing or unusual sweating (e.g., sweating without exertion)    Negative: [1] Stiff neck (can't put chin to chest) AND [2] headache    Negative: [1] Stiff neck (can't put chin to chest) AND " [2] fever    Protocols used: NECK PAIN OR [id number]    COVID 19 Nurse Triage Plan/Patient Instructions    Please be aware that novel coronavirus (COVID-19) may be circulating in the community. If you develop symptoms such as fever, cough, or SOB or if you have concerns about the presence of another infection including coronavirus (COVID-19), please contact your health care provider or visit [email or web address].     Disposition/Instructions    ED Visit recommended. Follow protocol based instructions.     Bring Your Own Device:  Please also bring your smart device(s) (smart phones, tablets, laptops) and their charging cables for your personal use and to communicate with your care team during your visit.    Thank you for taking steps to prevent the spread of this virus.  o Limit your contact with others.  o Wear a simple mask to cover your cough.  o Wash your hands well and often.    Resources    [facility]: About COVID-19: [email or web address]    CDC: What to Do If You're Sick: www.cdc.gov/coronavirus/2019-ncov/about/steps-when-sick.html    CDC: Ending Home Isolation: www.cdc.gov/coronavirus/2019-ncov/hcp/disposition-in-home-patients.html     CDC: Caring for Someone: www.cdc.gov/coronavirus/2019-ncov/if-you-are-sick/care-for-someone.html     [facility]: Interim Guidance for Hospital Discharge to Home: www.health.[facility].[place].us/diseases/coronavirus/hcp/hospdischarge.pdf    [facility] clinical trials (COVID-19 research studies): [email or web address]     Below are the COVID-19 hotlines at the [place] [facility] ([facility]). Interpreters are available.   o For health questions: Call 259-860-7498 or 1-789.730.3233 (7 a.m. to 7 p.m.)  o For questions about schools and childcare: Call 204-867-2967 or 1-513.575.7832 (7 a.m. to 7 p.m.)

## 2022-04-19 ENCOUNTER — OFFICE VISIT (OUTPATIENT)
Dept: NEUROSURGERY | Facility: CLINIC | Age: 52
End: 2022-04-19

## 2022-04-19 VITALS — DIASTOLIC BLOOD PRESSURE: 75 MMHG | OXYGEN SATURATION: 99 % | HEART RATE: 75 BPM | SYSTOLIC BLOOD PRESSURE: 132 MMHG

## 2022-04-19 DIAGNOSIS — M54.2 NECK PAIN: Primary | ICD-10-CM

## 2022-04-19 DIAGNOSIS — M48.02 FORAMINAL STENOSIS OF CERVICAL REGION: ICD-10-CM

## 2022-04-19 DIAGNOSIS — M54.12 CERVICAL RADICULOPATHY: ICD-10-CM

## 2022-04-19 PROCEDURE — 99214 OFFICE O/P EST MOD 30 MIN: CPT | Performed by: PHYSICIAN ASSISTANT

## 2022-04-19 RX ORDER — CYCLOBENZAPRINE HCL 5 MG
5 TABLET ORAL 3 TIMES DAILY PRN
Qty: 30 TABLET | Refills: 0 | Status: SHIPPED | OUTPATIENT
Start: 2022-04-19

## 2022-04-19 ASSESSMENT — PAIN SCALES - GENERAL: PAINLEVEL: SEVERE PAIN (7)

## 2022-04-19 NOTE — LETTER
4/19/2022         RE: Earlene Mark  [address]  [facility] [address]        Dear Colleague,    Thank you for referring your patient, Earlene Mark, to the [facility]UR[facility]. Please see a copy of my visit note below.    Neurosurgery Consult    HPI    Ms. Mark is a 51-year-old female presents to clinic for evaluation of left-sided neck pain radiating down her arm in a C7 distribution.  She also describes pain in her lateral neck and suprascapular and trapezius region on the left.  She denies any right-sided symptoms.  Denies loss of dexterity in her fingers loss were an issue in her feet or issues with her gait.  She has been having symptoms on and off for about a year, but they have significantly exacerbated in the last week.  She thinks this may have something to do with increased stress due to death in the family.  She has not tried cervical traction, she has had 2 interlaminar injections in the past which provided some temporary relief.     She feels decreased range of motion when turning her head to the left, she gets relief from her pain when she is lying flat and the pain is worse when she is upright.    Is currently having constant numbness in her left hand.    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    Non-smoker      B/P: 132/75, T: Data Unavailable, P: 75, R: Data Unavailable       Exam    Alert and oriented no acute distress  Bilateral upper extremities with 5/5 strength  Cherrie sign negative  Reflexes 2+ triceps, biceps, brachioradialis    Bilateral lower extremities with 5/5 strength  Reflexes absent patella/1+ ankle bilaterally  Negative straight leg raise bilaterally  Negative ankle clonus negative Babinski bilaterally  Gait is normal    Increased pain with rotation of the head to the left.    Decreased pain with lateral  flexion of the neck to the right.    Shoulder with mild tenderness to palpation anteriorly, no tenderness at the humeral joint laterally, no tenderness posteriorly, there is an area of point tenderness in the trapezius on the left, no tenderness in the cervical spine midline.    Imaging    Cervical MRI from May 2021 demonstrated severe foraminal stenosis on the left at C3-4, C4-5, C6/7 and moderate foraminal stenosis at C5-6.  No cervical stenosis centrally, no abnormal cord signal.    Assessment    Left cervical radiculopathy  Left multilevel foraminal stenosis    Plan:      I would recommend patient try a trial of cervical traction with physical therapy and have a home unit issued if she is finding it helpful.  Cervical traction is not helpful or if symptoms worsen she should discontinue it and follow-up with me if other physical therapy exercises are not helping.    Total time of 30 minutes spent with the patient today in counseling and coordination of care.      Again, thank you for allowing me to participate in the care of your patient.        Sincerely,        Luiza Crow PA-C

## 2022-04-19 NOTE — PROGRESS NOTES
Neurosurgery Consult    HPI    Ms. Mark is a 51-year-old female presents to clinic for evaluation of left-sided neck pain radiating down her arm in a C7 distribution.  She also describes pain in her lateral neck and suprascapular and trapezius region on the left.  She denies any right-sided symptoms.  Denies loss of dexterity in her fingers loss were an issue in her feet or issues with her gait.  She has been having symptoms on and off for about a year, but they have significantly exacerbated in the last week.  She thinks this may have something to do with increased stress due to death in the family.  She has not tried cervical traction, she has had 2 interlaminar injections in the past which provided some temporary relief.     She feels decreased range of motion when turning her head to the left, she gets relief from her pain when she is lying flat and the pain is worse when she is upright.    Is currently having constant numbness in her left hand.    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    Non-smoker      B/P: 132/75, T: Data Unavailable, P: 75, R: Data Unavailable       Exam    Alert and oriented no acute distress  Bilateral upper extremities with 5/5 strength  Cherrie sign negative  Reflexes 2+ triceps, biceps, brachioradialis    Bilateral lower extremities with 5/5 strength  Reflexes absent patella/1+ ankle bilaterally  Negative straight leg raise bilaterally  Negative ankle clonus negative Babinski bilaterally  Gait is normal    Increased pain with rotation of the head to the left.    Decreased pain with lateral flexion of the neck to the right.    Shoulder with mild tenderness to palpation anteriorly, no tenderness at the humeral joint laterally, no tenderness posteriorly, there is an area of point tenderness in the trapezius on the left, no tenderness in the cervical spine  midline.    Imaging    Cervical MRI from May 2021 demonstrated severe foraminal stenosis on the left at C3-4, C4-5, C6/7 and moderate foraminal stenosis at C5-6.  No cervical stenosis centrally, no abnormal cord signal.    Assessment    Left cervical radiculopathy  Left multilevel foraminal stenosis    Plan:      I would recommend patient try a trial of cervical traction with physical therapy and have a home unit issued if she is finding it helpful.  Cervical traction is not helpful or if symptoms worsen she should discontinue it and follow-up with me if other physical therapy exercises are not helping.    Total time of 30 minutes spent with the patient today in counseling and coordination of care.

## 2022-04-19 NOTE — NURSING NOTE
"Earlene Mark is a 51 year old female who presents for:  Chief Complaint   Patient presents with     Neurologic Problem     Lumbar Radiculopathy; Numbness in upper back/arm not improving        Initial Vitals:  /75   Pulse 75   LMP 02/01/2010   SpO2 99%  Estimated body mass index is 23.17 kg/m  as calculated from the following:    Height as of 10/15/21: 5' 4\" (1.626 m).    Weight as of 10/15/21: 135 lb (61.2 kg).. There is no height or weight on file to calculate BSA. BP completed using cuff size: regular  Severe Pain (7)      Bran Miller MA    "

## 2022-04-21 ENCOUNTER — HOSPITAL ENCOUNTER (OUTPATIENT)
Dept: PHYSICAL THERAPY | Facility: CLINIC | Age: 52
Setting detail: THERAPIES SERIES
Discharge: HOME | End: 2022-04-21
Attending: PHYSICIAN ASSISTANT

## 2022-04-21 DIAGNOSIS — M48.02 FORAMINAL STENOSIS OF CERVICAL REGION: ICD-10-CM

## 2022-04-21 DIAGNOSIS — M54.12 CERVICAL RADICULOPATHY: ICD-10-CM

## 2022-04-21 DIAGNOSIS — M54.2 NECK PAIN: ICD-10-CM

## 2022-04-21 PROCEDURE — 97140 MANUAL THERAPY 1/> REGIONS: CPT | Mod: GP | Performed by: PHYSICAL THERAPIST

## 2022-04-21 PROCEDURE — 97161 PT EVAL LOW COMPLEX 20 MIN: CPT | Mod: GP | Performed by: PHYSICAL THERAPIST

## 2022-04-21 PROCEDURE — 97012 MECHANICAL TRACTION THERAPY: CPT | Mod: GP | Performed by: PHYSICAL THERAPIST

## 2022-04-22 NOTE — PROGRESS NOTES
04/21/22 1139   General Information   Type of Visit Initial OP Ortho PT Evaluation   Start of Care Date 04/21/22   Referring Physician UBALDO PIERCE   Patient/Family Goals Statement Decrease pain and tingling   Orders Evaluate and Treat   Date of Order 04/19/22   Certification Required? No   Medical Diagnosis M54.2 (ICD-10-CM) - Neck pain, M48.02 (ICD-10-CM) - Foraminal stenosis of cervical region, M54.12 (ICD-10-CM) - Cervical radiculopathy   Surgical/Medical history reviewed Yes   Precautions/Limitations no known precautions/limitations       Present No   Body Part(s)   Body Part(s) Cervical Spine   Presentation and Etiology   Pertinent history of current problem (include personal factors and/or comorbidities that impact the POC) Pt is a 52 y/o female who presents to physical therapy with complaints of L sided neck pain and numbness/tingling that radiates into the left arm. Symptoms started over a year ago. She had imaging and an Xray taken and received physical therapy and an injection for her shoulder. About 3 weeks ago she had a significant increase in symptoms, which she thinks was triggered by stress due to a death in the family. Symptoms are now constant, aggravated by sitting and decreased by lying on her back. Yesterday she had an appointment with a neurosurgery PA, who recommended traction. Pt is willing to try anything to decrease her symptoms and has already purchased a Lumiary traction unit. She has taken Vicodin and muscle relaxers with no relief. She has also tried chiropractor and massage with only temporary relief. Will be trying acupuncture. 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   Impairments A. Pain;D. Decreased ROM;E. Decreased flexibility;F. Decreased strength and endurance;J. Burning;K. Numbness;L. Tingling;M. Locking or catching;N. Headaches   Functional Limitations perform activities of daily  "living;perform required work activities;perform desired leisure / sports activities   Symptom Location Medial to shoulderblade, radiates all the way down L arm (middle 3 fingers)   How/Where did it occur From insidious onset;Other  (exacerbated by stress)   Onset date of current episode/exacerbation 01/01/21   Chronicity Chronic   Pain rating (0-10 point scale) Best (/10);Worst (/10)   Best (/10) 4/10 with lying down with ice   Worst (/10) 10 with sitting, walking   Pain quality F. Stabbing;D. Burning;A. Sharp  (\"hot poker\", numbness/tingling)   Frequency of pain/symptoms A. Constant   Pain/symptoms are: The same all the time   Pain/symptoms exacerbated by A. Sitting;B. Walking;G. Certain positions;K. Home tasks;L. Work tasks   Pain/symptoms eased by G. Heat;H. Cold;I. OTC medication(s);K. Other;F. Certain positions;C. Rest   Pain eased by comment vicodin, lying down   Progression of symptoms since onset: Worsened   Current / Previous Interventions   Diagnostic Tests: X-ray   X-ray Results Results   X-ray results 1. Severe facet arthropathy on the left at C3-C4 with marrow edema suggestive of active arthropathy. 2. Severe foraminal stenoses on the left at C3-C4, C4-C5, and C6-C7. 3. Moderate foraminal stenosis on the left at C5-C6.   Prior Level of Function   Functional Level Prior Comment independent   Current Level of Function   Current Community Support Family/friend caregiver  (lives with  and 2 sons)   Patient role/employment history A. 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 No   Have you fallen and had an injury in the past year? No   Is patient a fall risk? No   Abuse Screen (yes response referral indicated)   Feels Unsafe at Home or Work/School no   Feels Threatened by Someone no   Does Anyone Try to Keep You From Having Contact with Others " "or Doing Things Outside Your Home? no   Physical Signs of Abuse Present no   Cervical Spine   Cervical Left Side Bending ROM 22   Cervical Right Rotation ROM 55   Cervical Left Rotation ROM 51   Cervical Flexion ROM 55   Cervical Extension ROM 50   Cervical Right Side Bending ROM 26   Thoracic Flexion ROM WNL, \"feels good\"   Shoulder AROM Screen Overhead reach WNL   Shoulder Abd (C5) Strength 5/5 bilaterally   Elbow Extension (C7) Strength 5/5 bilaterally   Wrist Extension (C6) Strength 5/5 bilaterally   Thumb Abd (C8) Strength 5/5 bilaterally   5th Finger Add (T1) Strength 5/5 bilaterally   Shoulder/Wrist/Hand Strength Comments  strength grossly symmetrical   Upper Trapezius Flexibility Increased muscle tension L>R   Scalene Flexibility Increased muscle tension in anterior scalene   Cervical Distraction Test Positive. Decreased symptoms in L hand   Segmental Mobility-Cervical Hypomobile throughout, PA's and L sideglides decreased symptoms in LUE   Dermatome/Sensory Testing Normal   Biceps Reflexes 2+   Brachioradialis Reflexes 2+   Triceps Reflexes 2+   Planned Therapy Interventions   Planned Therapy Interventions ADL retraining;IADL retraining;joint mobilization;manual therapy;motor coordination training;neuromuscular re-education;ROM;strengthening;stretching   Planned Modality Interventions   Planned Modality Interventions Cryotherapy;Hot packs;Traction   Clinical Impression   Criteria for Skilled Therapeutic Interventions Met yes, treatment indicated   PT Diagnosis L sided neck pain with radiating symptoms to LUE   Influenced by the following impairments Neck pain, headaches, numbness/tingling, decreased cervical ROM, cervical hypomobility   Functional limitations due to impairments Decreased tolerance for daily activities such as walking, cooking, cleaning, sitting, and caring for her son.   Clinical Presentation Stable/Uncomplicated   Clinical Presentation Rationale No significant comorbidities that impact " the plan of care.   Clinical Decision Making (Complexity) Low complexity   Therapy Frequency 1 time/week   Predicted Duration of Therapy Intervention (days/wks) 90 days   Risk & Benefits of therapy have been explained Yes   Patient, Family & other staff in agreement with plan of care Yes   Clinical Impression Comments Pt is a 52 y/o female who presents to physical therapy with complaints of L sided neck pain with radiating symptoms to the LUE. She presents today with cervical hypomobility and tension in the cervical musculature. Current symptoms are limiting her tolerance for daily activities such as cooking, cleaning, walking, sitting, and caring for her son. Pt would benefit from skilled physical therapy to maximize her level of function.   Education Assessment   Preferred Learning Style Listening;Reading;Demonstration;Pictures/video   Barriers to Learning No barriers   ORTHO GOALS   PT Ortho Eval Goals 1;2;3   Ortho Goal 1   Goal Identifier NDI   Goal Description Pt will score <30% on NDI in order to improve tolerance to daily activities.   Target Date 07/20/22   Ortho Goal 2   Goal Identifier Symptom frequency   Goal Description Pt will report no symptoms in her neck or LUE for at least 50% of her day in order to care for her son.   Target Date 07/20/22   Ortho Goal 3   Goal Identifier Symptom intensity   Goal Description Pt will report an average pain intensity of 2/10 or less in order to better tolerate cooking and cleaning.   Target Date 07/20/22   Total Evaluation Time   PT Eval, Low Complexity Minutes (64774) 40         Bethany Storey, PT, DPT, OCS, CSCS  [facility]  [phone]

## 2022-04-29 ENCOUNTER — HOSPITAL ENCOUNTER (OUTPATIENT)
Dept: PHYSICAL THERAPY | Facility: CLINIC | Age: 52
Setting detail: THERAPIES SERIES
Discharge: HOME | End: 2022-04-29
Attending: PHYSICIAN ASSISTANT

## 2022-04-29 PROCEDURE — 97012 MECHANICAL TRACTION THERAPY: CPT | Mod: GP

## 2022-04-29 PROCEDURE — 97530 THERAPEUTIC ACTIVITIES: CPT | Mod: GP

## 2022-05-01 ENCOUNTER — MYC MEDICAL ADVICE (OUTPATIENT)
Dept: NEUROSURGERY | Facility: CLINIC | Age: 52
End: 2022-05-01

## 2022-05-02 ENCOUNTER — VIRTUAL VISIT (OUTPATIENT)
Dept: FAMILY MEDICINE | Facility: CLINIC | Age: 52
End: 2022-05-02

## 2022-05-02 DIAGNOSIS — Z11.59 ENCOUNTER FOR HEPATITIS C SCREENING TEST FOR LOW RISK PATIENT: ICD-10-CM

## 2022-05-02 DIAGNOSIS — Z11.4 SCREENING FOR HUMAN IMMUNODEFICIENCY VIRUS WITHOUT PRESENCE OF RISK FACTORS: ICD-10-CM

## 2022-05-02 DIAGNOSIS — Z13.220 LIPID SCREENING: ICD-10-CM

## 2022-05-02 DIAGNOSIS — G43.009 MIGRAINE WITHOUT AURA AND WITHOUT STATUS MIGRAINOSUS, NOT INTRACTABLE: Primary | ICD-10-CM

## 2022-05-02 DIAGNOSIS — L50.1 CHRONIC IDIOPATHIC URTICARIA: ICD-10-CM

## 2022-05-02 DIAGNOSIS — Z12.11 COLON CANCER SCREENING: ICD-10-CM

## 2022-05-02 DIAGNOSIS — G47.09 OTHER INSOMNIA: ICD-10-CM

## 2022-05-02 DIAGNOSIS — Z12.31 VISIT FOR SCREENING MAMMOGRAM: ICD-10-CM

## 2022-05-02 PROBLEM — R06.2 WHEEZING: Status: RESOLVED | Noted: 2021-03-10 | Resolved: 2022-05-02

## 2022-05-02 PROCEDURE — 99214 OFFICE O/P EST MOD 30 MIN: CPT | Mod: 95 | Performed by: FAMILY MEDICINE

## 2022-05-02 RX ORDER — ESTRADIOL 10 UG/1
INSERT VAGINAL
COMMUNITY
Start: 2022-05-02

## 2022-05-02 RX ORDER — ESTRADIOL 10 UG/1
INSERT VAGINAL
COMMUNITY
Start: 2022-04-01 | End: 2022-05-02

## 2022-05-02 RX ORDER — TRAZODONE HYDROCHLORIDE 50 MG/1
100-150 TABLET, FILM COATED ORAL AT BEDTIME
Qty: 220 TABLET | Refills: 3 | Status: SHIPPED | OUTPATIENT
Start: 2022-05-02 | End: 2023-04-21

## 2022-05-02 ASSESSMENT — ASTHMA QUESTIONNAIRES: ACT_TOTALSCORE: 23

## 2022-05-02 NOTE — TELEPHONE ENCOUNTER
Patient sent [employer] message stating she has been doing PT, traction, acupuncture, ice, takgn mm relaxer, and NSAIDs without improvement of s/s. She is also doing food testing on Tuesday 5/3/22 to inquire about diet r/t inflammation.     Per Luiza Crow PA-C Cervical traction is not helpful or if symptoms worsen she should discontinue it and follow-up with me if other physical therapy exercises are not helping    Patient updated of this. Patient given phone number to call and schedule with Luiza Crow PA-C

## 2022-05-03 ENCOUNTER — VIRTUAL VISIT (OUTPATIENT)
Dept: NEUROSURGERY | Facility: CLINIC | Age: 52
End: 2022-05-03

## 2022-05-03 VITALS — BODY MASS INDEX: 23.9 KG/M2 | WEIGHT: 140 LBS | HEIGHT: 64 IN

## 2022-05-03 DIAGNOSIS — M54.12 CERVICAL RADICULOPATHY: Primary | ICD-10-CM

## 2022-05-03 PROCEDURE — 99213 OFFICE O/P EST LOW 20 MIN: CPT | Mod: 95 | Performed by: PHYSICIAN ASSISTANT

## 2022-05-03 RX ORDER — GABAPENTIN 300 MG/1
300 CAPSULE ORAL DAILY
Qty: 90 CAPSULE | Refills: 1 | Status: SHIPPED | OUTPATIENT
Start: 2022-05-03 | End: 2022-10-27

## 2022-05-03 ASSESSMENT — PAIN SCALES - GENERAL: PAINLEVEL: SEVERE PAIN (7)

## 2022-05-03 NOTE — LETTER
"    5/3/2022         RE: Earlene Mark  [address]  [facility] [address]        Dear Colleague,    Thank you for referring your patient, Earlene Mark, to the [facility]. Please see a copy of my visit note below.    Earlene Mark is a 51 year old female who is being evaluated via a billable telephone visit.      Due to COVID-19 this visit has been performed over the phone verses face to face.     The patient has been notified of following:     \"This telephone visit will be conducted via a call between you and your physician/provider. We have found that certain health care needs can be provided without the need for a physical exam.  This service lets us provide the care you need with a short phone conversation.  If a prescription is necessary we can send it directly to your pharmacy.  If lab work is needed we can place an order for that and you can then stop by our lab to have the test done at a later time.    If during the course of the call the physician/provider feels a telephone visit is not appropriate, you will not be charged for this service.\"     Earlene Mark complains of  No chief complaint on file.      I have reviewed and updated the patient's Past Medical History, Social History, Family History and Medication List.    ALLERGIES  Morphine, Prochlorperazine, and Seasonal allergies    Additional provider notes:    Earlene Mark is a 51 year old female who was recently evaluated in clinic for left-sided neck pain and left C7 radiculopathy.  She was recommended to begin physical therapy.  Traction, she has had 1 session of physical therapy, and done cervical traction at home a few times, she feels like there has been some slight improvement.  She calls today to discuss any other options that may be helpful for her.  She has been taking ibuprofen with some help.  Continue with acupuncture and that also seems to be helping, she is also try to decrease her stress. "  She feels like the symptoms in her left arm are fairly constant and similar to how they at our previous visit.    Assessment    Left-sided neck pain  Left foraminal stenosis cervical spine  Left cervical radiculopathy    Plan:    I would recommend the patient continue with our plan for physical therapy and cervical traction.  We also discussed starting gabapentin to see if this could help with her symptoms, we will begin with a dose of 300 mg at bedtime daily and adjust the dose as needed from there.  She will follow-up with us in the next few weeks to update us on how she is doing.    Phone call duration: 10 minutes  Start Time 840  End Time 850    Luiza Crow PA-C    Patient provided verbal consent for the phone visit today.         Again, thank you for allowing me to participate in the care of your patient.        Sincerely,        Luiza Crow PA-C

## 2022-05-03 NOTE — PROGRESS NOTES
"Earlene Mark is a 51 year old female who is being evaluated via a billable telephone visit.      Due to COVID-19 this visit has been performed over the phone verses face to face.     The patient has been notified of following:     \"This telephone visit will be conducted via a call between you and your physician/provider. We have found that certain health care needs can be provided without the need for a physical exam.  This service lets us provide the care you need with a short phone conversation.  If a prescription is necessary we can send it directly to your pharmacy.  If lab work is needed we can place an order for that and you can then stop by our lab to have the test done at a later time.    If during the course of the call the physician/provider feels a telephone visit is not appropriate, you will not be charged for this service.\"     Earlene Mark complains of  No chief complaint on file.      I have reviewed and updated the patient's Past Medical History, Social History, Family History and Medication List.    ALLERGIES  Morphine, Prochlorperazine, and Seasonal allergies    Additional provider notes:    Earlene Mark is a 51 year old female who was recently evaluated in clinic for left-sided neck pain and left C7 radiculopathy.  She was recommended to begin physical therapy.  Traction, she has had 1 session of physical therapy, and done cervical traction at home a few times, she feels like there has been some slight improvement.  She calls today to discuss any other options that may be helpful for her.  She has been taking ibuprofen with some help.  Continue with acupuncture and that also seems to be helping, she is also try to decrease her stress.  She feels like the symptoms in her left arm are fairly constant and similar to how they at our previous visit.    Assessment    Left-sided neck pain  Left foraminal stenosis cervical spine  Left cervical radiculopathy    Plan:    I would recommend the patient " continue with our plan for physical therapy and cervical traction.  We also discussed starting gabapentin to see if this could help with her symptoms, we will begin with a dose of 300 mg at bedtime daily and adjust the dose as needed from there.  She will follow-up with us in the next few weeks to update us on how she is doing.    Phone call duration: 10 minutes  Start Time 840  End Time 850    Luiza Crow PA-C    Patient provided verbal consent for the phone visit today.

## 2022-05-03 NOTE — NURSING NOTE
"Earlene Mark is a 51 year old female who presents for:  No chief complaint on file.       Initial Vitals:  Ht 5' 4\" (1.626 m)   Wt 140 lb (63.5 kg)   LMP 02/01/2010   BMI 24.03 kg/m   Estimated body mass index is 24.03 kg/m  as calculated from the following:    Height as of this encounter: 5' 4\" (1.626 m).    Weight as of this encounter: 140 lb (63.5 kg).. Body surface area is 1.69 meters squared. BP completed using cuff size: NA (Not Taken)  Severe Pain (7)    Nursing Comments:     Kris Adams      "

## 2022-05-10 ENCOUNTER — MEDICAL CORRESPONDENCE (OUTPATIENT)
Dept: HEALTH INFORMATION MANAGEMENT | Facility: CLINIC | Age: 52
End: 2022-05-10

## 2022-05-13 ENCOUNTER — HOSPITAL ENCOUNTER (OUTPATIENT)
Dept: MRI IMAGING | Facility: CLINIC | Age: 52
Discharge: HOME | End: 2022-05-13
Attending: PHYSICIAN ASSISTANT | Admitting: PHYSICIAN ASSISTANT

## 2022-05-13 DIAGNOSIS — M25.561 RIGHT KNEE PAIN: ICD-10-CM

## 2022-05-13 PROCEDURE — 73721 MRI JNT OF LWR EXTRE W/O DYE: CPT | Mod: RT

## 2022-05-13 PROCEDURE — 73721 MRI JNT OF LWR EXTRE W/O DYE: CPT | Mod: 26 | Performed by: RADIOLOGY

## 2022-05-16 ENCOUNTER — TELEPHONE (OUTPATIENT)
Dept: FAMILY MEDICINE | Facility: CLINIC | Age: 52
End: 2022-05-16

## 2022-05-16 NOTE — TELEPHONE ENCOUNTER
Reason for Call:  Request for results:    Name of test or procedure: MRI Right Knee    Date of test of procedure: 5/13/22    Location of the test or procedure: [facility]    OK to leave the result message on voice mail or with a family member? YES    Phone number Patient can be reached at:  Home number on file [phone] (home)    Additional comments: Patient went to a walk in ortho office at [facility]. They ordered an MRI of her right knee which she completed on Friday here at [place]. Patient called their office for her results and stated she got a run around and got no where with them. She is hoping her provider could look at the results and give her some explanation on the findings?    Call taken on 5/16/2022 at 12:50 PM by Ana Pepper LPN

## 2022-05-17 NOTE — TELEPHONE ENCOUNTER
It does not look like this is anything serious so that would require surgery at this time.  The MRI showed that she has some very subtle free edge and undersurface fraying of the posterior horn of her right medial meniscus which is most likely degenerative changes.  There is also some full-thickness fissuring or cracking of the cartilage along the medial patellar facet with some minimal edema underneath the cartilage.  The anterior cruciate ligaments are intact as are the lateral supporting ligaments and the lateral meniscus.  If she would like to see Ortho here just for a second opinion that would be appropriate.  If this was an acute injury it might be reasonable for her to wait 2 or 3 weeks to see if her symptoms improve.    Electronically signed by:  Kushal Linda M.D.  5/17/2022

## 2022-05-18 ENCOUNTER — OFFICE VISIT (OUTPATIENT)
Dept: ORTHOPEDICS | Facility: CLINIC | Age: 52
End: 2022-05-18

## 2022-05-18 ENCOUNTER — MYC MEDICAL ADVICE (OUTPATIENT)
Dept: FAMILY MEDICINE | Facility: CLINIC | Age: 52
End: 2022-05-18

## 2022-05-18 ENCOUNTER — ANCILLARY PROCEDURE (OUTPATIENT)
Dept: GENERAL RADIOLOGY | Facility: CLINIC | Age: 52
End: 2022-05-18
Attending: PHYSICIAN ASSISTANT

## 2022-05-18 VITALS
WEIGHT: 140 LBS | HEIGHT: 64 IN | DIASTOLIC BLOOD PRESSURE: 80 MMHG | SYSTOLIC BLOOD PRESSURE: 110 MMHG | BODY MASS INDEX: 23.9 KG/M2

## 2022-05-18 DIAGNOSIS — M22.2X1 PATELLOFEMORAL SYNDROME OF RIGHT KNEE: ICD-10-CM

## 2022-05-18 DIAGNOSIS — S83.241A ACUTE MEDIAL MENISCUS TEAR OF RIGHT KNEE, INITIAL ENCOUNTER: ICD-10-CM

## 2022-05-18 DIAGNOSIS — M25.561 RIGHT KNEE PAIN: ICD-10-CM

## 2022-05-18 DIAGNOSIS — M94.261 CHONDROMALACIA OF RIGHT KNEE: Primary | ICD-10-CM

## 2022-05-18 PROCEDURE — 99214 OFFICE O/P EST MOD 30 MIN: CPT | Mod: 25 | Performed by: PHYSICIAN ASSISTANT

## 2022-05-18 PROCEDURE — 73564 X-RAY EXAM KNEE 4 OR MORE: CPT | Mod: TC | Performed by: RADIOLOGY

## 2022-05-18 PROCEDURE — 20610 DRAIN/INJ JOINT/BURSA W/O US: CPT | Mod: RT | Performed by: PHYSICIAN ASSISTANT

## 2022-05-18 RX ORDER — BUPIVACAINE HYDROCHLORIDE 5 MG/ML
3 INJECTION, SOLUTION PERINEURAL
Status: SHIPPED | OUTPATIENT
Start: 2022-05-18

## 2022-05-18 RX ORDER — MELOXICAM 15 MG/1
15 TABLET ORAL DAILY
Qty: 30 TABLET | Refills: 1 | Status: SHIPPED | OUTPATIENT
Start: 2022-05-18 | End: 2022-06-28

## 2022-05-18 RX ORDER — TRIAMCINOLONE ACETONIDE 40 MG/ML
80 INJECTION, SUSPENSION INTRA-ARTICULAR; INTRAMUSCULAR
Status: SHIPPED | OUTPATIENT
Start: 2022-05-18

## 2022-05-18 RX ADMIN — BUPIVACAINE HYDROCHLORIDE 3 ML: 5 INJECTION, SOLUTION PERINEURAL at 17:16

## 2022-05-18 RX ADMIN — TRIAMCINOLONE ACETONIDE 80 MG: 40 INJECTION, SUSPENSION INTRA-ARTICULAR; INTRAMUSCULAR at 17:16

## 2022-05-18 ASSESSMENT — PAIN SCALES - GENERAL: PAINLEVEL: MODERATE PAIN (4)

## 2022-05-18 NOTE — PROGRESS NOTES
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  She really enjoys working out and does this at home and at the gym.  Her 's name is Ty    She is being seen for   Chief Complaints and History of Present Illnesses   Patient presents with     Knee Pain     Right knee pain         History of Present Illness:   Mechanism of Injury: No injury fall or trauma  Location: Medial lateral posterior knee as well as anterior medial knee  Duration of Pain: 8 to 9 months  Rating of Pain: 4 out of 10  Pain Quality: Achy  Pain is better with: Rest and decreased activity  Pain is worse with: Heavy activity  Treatment so far consists of: She has tried 600 of ibuprofen twice a day which has helped slightly, decreased activity has helped, ice has helped, she has not tried Tylenol.  She takes gabapentin for her neck but that does not help her knee.  She has tried some exercises on her own that have not helped.  She has not had any injections or any formal physical therapy.  Patient was seen at [facility] orthopedics in [place] and an MRI was ordered.  Associated Features: Patient denies any shooting burning or electric pain.  Patient states some swelling intermittently.  Patient does have some catching she notices but notes mechanical locking.  Patient denies any numbness or tingling.  Prior history of related problems: No previous surgery or trauma or fracture to the right knee.  Pain is Limiting: Working out or being very active.  Here to: Orthopedic consultation and MRI results  The Pain Has: Gotten worse  Additional History: Patient has not had the MRI read yet.      Patient's past medical, surgical, social and family histories reviewed.     Past Medical History:   Diagnosis Date     Allergic rhinitis, cause unspecified     ? 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  benign cervix     TEST NOT FOUND  2001    Abdominoplasty (tummy holly)     [facility]  DELIVERY ONLY      , Low Cervical       Medications:  albuterol (2.5 MG/3ML) 0.083% neb solution, Take 1 vial (2.5 mg) by nebulization every 4 hours as needed for wheezing Hold on file. Will call if needed  albuterol (PROAIR HFA/PROVENTIL HFA/VENTOLIN HFA) 108 (90 Base) MCG/ACT inhaler, Inhale 2 puffs into the lungs every 4 hours as needed for shortness of breath / dyspnea or wheezing  co-enzyme Q-10 100 MG CAPS capsule, Take 100 mg by mouth daily  Cyanocobalamin (VITAMIN B-12) 5000 MCG TBDP, Take 1 tablet by mouth daily  cyclobenzaprine (FLEXERIL) 5 MG tablet, Take 1 tablet (5 mg) by mouth 3 times daily as needed for muscle spasms  DHEA 10 MG TABS, Take 1 tablet by mouth daily  estradiol (VAGIFEM) 10 MCG TABS vaginal tablet, INSERT 1 TABLET VAGINALLY AT NIGHT FOR 2 WEEKS MAINTENANCE 1 THREE DAYS A WEEK  fluticasone (FLOVENT HFA) 110 MCG/ACT inhaler, Inhale 1 puff into the lungs 2 times daily  fluticasone-salmeterol (ADVAIR) 250-50 MCG/DOSE inhaler, Inhale 1 puff into the lungs every 12 hours  gabapentin (NEURONTIN) 300 MG capsule, Take 1 capsule (300 mg) by mouth daily  levocetirizine (XYZAL) 5 MG tablet, Take 1 tablet (5 mg) by mouth 2 times daily  MULTIPLE VITAMIN OR TABS, 1 TABLET ORALLY DAILY  Omega-3 Fatty Acids (OMEGA 3 PO), Take 1,250 mg by mouth 2 times daily  rizatriptan (MAXALT) 10 MG tablet, Take 1/2 to 1 tablet by mouth at onset of migraine, may repeat in 2 hours  "-Max 3 tablet(s) in 24 hours  Sodium Chloride-Sodium Bicarb (AYR SALINE NASAL RINSE NA),   TESTOSTERONE 2MG,   traZODone (DESYREL) 50 MG tablet, Take 2-3 tablets (100-150 mg) by mouth At Bedtime Take 2 tablets by mouth daily  Zinc 30 MG TABS, Take 2 tablets by mouth At Bedtime    No current facility-administered medications on file prior to visit.      Allergies   Allergen Reactions     Morphine      Prochlorperazine      compazine -dystonic reaction     Seasonal Allergies        Social History     Occupational History     Employer: SELF   Tobacco Use     Smoking status: Never Smoker     Smokeless tobacco: Never Used   Substance and Sexual Activity     Alcohol use: No     Alcohol/week: 10.0 standard drinks     Drug use: No     Sexual activity: Yes     Partners: Male     Birth control/protection: Surgical     Comment:  had vasectomy  Oct. 11, 2002, she had a hysterectomy       Family History   Problem Relation Age of Onset     Neurologic Disorder Mother         migraines     Lipids Mother      Arthritis Mother         rheumatoid     Chronic Obstructive Pulmonary Disease Mother         smoker     Hypertension Father      Lipids Father      Blood Disease Father 51        DVT  at age 51 from probable PE     Neurologic Disorder Father         Muscular Dystrophy     Muscular Dystrophy Brother 19     Diabetes Paternal Grandmother      Diabetes Paternal Grandfather      Cancer Paternal Grandfather         colon     Neurologic Disorder Brother         Muscular Dystrophy  at age 19 from an MI     Cancer Paternal Uncle         colon     Asthma Son      Congenital Anomalies Son         epilepsy hydrocephilis       REVIEW OF SYSTEMS  10 point review systems performed otherwise negative as noted as per history of present illness.    Physical Exam:  Vitals: /80   Ht 1.626 m (5' 4\")   Wt 63.5 kg (140 lb)   LMP 2010   BMI 24.03 kg/m    BMI= Body mass index is 24.03 kg/m .    Constitutional: healthy, " alert and no acute distress   Psychiatric: mentation appears normal and affect normal/bright  NEURO: no focal deficits, CMS intact right lower extremity   RESP: Normal with easy respirations and no use of accessory muscles to breathe, no audible wheezing or retractions  CV: Calf soft and nontender to palpation, leg warm   SKIN: No erythema, rashes, excoriation, or breakdown. No evidence of infection.   MUSCULOSKELETAL:    INSPECTION of right knee: No gross deformities, erythema, edema, ecchymosis, atrophy or fasciculations.     PALPATION: Medial posterior and lateral posterior joint line tenderness noted.  No tenderness lateral, anterior and posterior portion of the knee. No specific joint line tenderness on the lateral and medial anterior and middle joint line.  No increased warmth.  No effusion.  No patellofemoral pain with compression and range of motion but there is crepitus.    ROM: Extension full, flexion to 125 . All range of motion without catching, locking or pain.       STRENGTH: 5 out of 5 quad and hamstring.     SPECIAL TEST: Patient has a negative Lachman's negative drawer sign. Patient's knee is stable to varus and valgus stress at 30  of flexion. Patient has a positive Marc's.  Patient also having negative Thessaly test  GAIT: non-antalgic  Lymph: no palpable lymph nodes    Diagnostic Modalities:  Recent Results (from the past 744 hour(s))   MR Knee Right w/o Contrast    Narrative    EXAMINATION: MRI of the right knee without contrast    DATE:  5/13/2022    HISTORY: Knee pain    TECHNIQUE: Multiplanar, multisequence MR imaging of the right knee was  obtained using standard sequences in 3 orthogonal planes without the  use of intravenous or intra-articular gadolinium contrast.    Comparison: None.    FINDINGS:    In the medial compartment, very subtle free edge and undersurface  irregularity of the posterior horn of the medial meniscus, presumed  degenerative. There is no high-grade or  full-thickness cartilage loss  or subchondral edema.    In the lateral compartment, the lateral meniscus is intact. There is  no high-grade or full-thickness cartilage loss or subchondral edema.    In the patellofemoral compartment, there is a focal area of grade 4  cartilage fissuring with subchondral edema along the medial patellar  facet.    The anterior and posterior cruciate ligaments are intact.    The tibial collateral ligament is intact. The anterior iliotibial  band, fibular collateral ligament, biceps femoris tendon, and  popliteus tendons are intact.    There is a small joint effusion. No popliteal cyst. No joint bodies.  Probable intraosseous cyst in the anterior aspect of the proximal  tibia.    The extensor mechanism is intact and normal in appearance.       Impression    IMPRESSION:  1. Small right knee joint effusion.  2. Very subtle free edge and undersurface fraying of the posterior  horn of the right knee medial meniscus, presumed degenerative.  3. Focal area of full-thickness cartilage fissuring along the medial  patellar facet with subchondral edema.  4. The anterior and posterior cruciate ligaments, medial and lateral  supporting structures, and lateral meniscus are intact.    TAWANA MILLER MD         SYSTEM ID:  [id number]     I agree with the above reading.    X-rays done today showing very mild medial and patellofemoral joint space narrowing.  No fracture no dislocation no tumor.  Patella is in central menstrual flow groove.  No fracture no dislocation or tumor.    Independent visualization of the images was performed.    Impression: 1.  Right knee patellofemoral chondromalacia.  2.  Right knee patellofemoral syndrome.  3.  Right knee medial meniscus tear, possible degenerative and subtle    Plan:  All of the above pertinent physical exam and imaging modalities findings was reviewed with Earlene and her  Ty.    Large Joint Injection/Arthocentesis: R knee joint    Date/Time: 5/18/2022  5:16 PM  Performed by: Jonathan Hameed PA-C  Authorized by: Jonathan Hameed PA-C     Indications:  Pain  Needle Size:  22 G  Guidance: landmark guided    Approach:  Anterolateral  Location:  Knee      Medications:  80 mg triamcinolone 40 MG/ML; 3 mL bupivacaine 0.5 %  Aspirate amount (mL):  0  Procedure discussed: discussed risks, benefits, and alternatives    Consent Given by:  Patient  Timeout: timeout called immediately prior to procedure    Prep: patient was prepped and draped in usual sterile fashion     The skin was prepped with betadine. The patient was in a seated position. I used ramses chloride spray prior to doing the injection.  The patient tolerated the injection well, and there were no complications. The injection site was covered with a Band-Aid.         FOCUSED PLAN:  Patient with MRI showing some chondromalacia in cartilage fissure in the patellofemoral area as well as medial meniscus tear that appears to be mostly degenerative and subtle.  Patient has history and physical exam that point towards both medial meniscus tear as well as patellofemoral syndrome.  Today we decided to do an order of formal physical therapy for a one-time visit to get exercises as she likes to do her own exercises on her own.  Also she is instructed to stop all impact activity as she likes to do a lot of working out.  Patient advised biking elliptical and swimming as best.  Patient is to switch from ibuprofen to Mobic to maximize the amount of medicine she is getting.  Patient is to ice elevate and rest.  Patient can follow-up on an as-needed basis.    Re-x-ray on return: No    ADDENDUM: X-ray read bianca calcification in the posterior portion of the knee thought to be intra-articular but when I look at it and we reviewed it I do believe it to be small and not a factor and also extra-articular.    This note was dictated with Revokom.    Jonathan Hameed PA-C

## 2022-05-18 NOTE — LETTER
5/18/2022         RE: Earlene Mark  [address]  [place] [id number]        Dear Colleague,    Thank you for referring your patient, Earlene Mark, to the [facility]. Please see a copy of my visit note below.    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  She really enjoys working out and does this at home and at the gym.  Her 's name is Ty    She is being seen for   Chief Complaints and History of Present Illnesses   Patient presents with     Knee Pain     Right knee pain         History of Present Illness:   Mechanism of Injury: No injury fall or trauma  Location: Medial lateral posterior knee as well as anterior medial knee  Duration of Pain: 8 to 9 months  Rating of Pain: 4 out of 10  Pain Quality: Achy  Pain is better with: Rest and decreased activity  Pain is worse with: Heavy activity  Treatment so far consists of: She has tried 600 of ibuprofen twice a day which has helped slightly, decreased activity has helped, ice has helped, she has not tried Tylenol.  She takes gabapentin for her neck but that does not help her knee.  She has tried some exercises on her own that have not helped.  She has not had any injections or any formal physical therapy.  Patient was seen at [facility] orthopedics in [place] and an MRI was ordered.  Associated Features: Patient denies any shooting burning or electric pain.  Patient states some swelling intermittently.  Patient does have some catching she notices but notes mechanical locking.  Patient denies any numbness or tingling.  Prior history of related problems: No previous surgery or trauma or fracture to the right knee.  Pain is Limiting: Working out or being very active.  Here to: Orthopedic consultation and MRI results  The Pain Has: Gotten worse  Additional History: Patient has not had the MRI read yet.      Patient's past medical,  surgical, social and family histories reviewed.     Past Medical History:   Diagnosis Date     Allergic rhinitis, cause unspecified     ? 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     DEPRESSIVE DISORDER NEC 2003     Menorrhagia 2009     s/p Hysterectomy, cervical pathology benign, no need for further pap smears 1/3/2013     Wheezing 3/10/2021        Past Surgical History:   Procedure Laterality Date     COLPOSCOPY,LOOP ELECTRD CERVIX EXCIS       HC CONIZATION CERVIX,KNIFE/LASER  08    FILEMON 3     HC REMOVAL OF TONSILS,12+ Y/O       HYSTERECTOMY, VAGINAL  02/02/10    laparoscopic assisted vaginal.  benign cervix     TEST NOT FOUND  2001    Abdominoplasty (tummy tuoscar)     [facility]  DELIVERY ONLY      , Low Cervical       Medications:  albuterol (2.5 MG/3ML) 0.083% neb solution, Take 1 vial (2.5 mg) by nebulization every 4 hours as needed for wheezing Hold on file. Will call if needed  albuterol (PROAIR HFA/PROVENTIL HFA/VENTOLIN HFA) 108 (90 Base) MCG/ACT inhaler, Inhale 2 puffs into the lungs every 4 hours as needed for shortness of breath / dyspnea or wheezing  co-enzyme Q-10 100 MG CAPS capsule, Take 100 mg by mouth daily  Cyanocobalamin (VITAMIN B-12) 5000 MCG TBDP, Take 1 tablet by mouth daily  cyclobenzaprine (FLEXERIL) 5 MG tablet, Take 1 tablet (5 mg) by mouth 3 times daily as needed for muscle spasms  DHEA 10 MG TABS, Take 1 tablet by mouth daily  estradiol (VAGIFEM) 10 MCG TABS vaginal tablet, INSERT 1 TABLET VAGINALLY AT NIGHT FOR 2 WEEKS MAINTENANCE 1 THREE DAYS A WEEK  fluticasone (FLOVENT HFA) 110 MCG/ACT inhaler, Inhale 1 puff into the lungs 2 times daily  fluticasone-salmeterol (ADVAIR) 250-50 MCG/DOSE inhaler, Inhale 1 puff into the lungs every 12 hours  gabapentin (NEURONTIN) 300 MG capsule, Take 1 capsule (300 mg) by mouth daily  levocetirizine (XYZAL) 5 MG tablet, Take 1 tablet (5 mg)  by mouth 2 times daily  MULTIPLE VITAMIN OR TABS, 1 TABLET ORALLY DAILY  Omega-3 Fatty Acids (OMEGA 3 PO), Take 1,250 mg by mouth 2 times daily  rizatriptan (MAXALT) 10 MG tablet, Take 1/2 to 1 tablet by mouth at onset of migraine, may repeat in 2 hours -Max 3 tablet(s) in 24 hours  Sodium Chloride-Sodium Bicarb (AYR SALINE NASAL RINSE NA),   TESTOSTERONE 2MG,   traZODone (DESYREL) 50 MG tablet, Take 2-3 tablets (100-150 mg) by mouth At Bedtime Take 2 tablets by mouth daily  Zinc 30 MG TABS, Take 2 tablets by mouth At Bedtime    No current facility-administered medications on file prior to visit.      Allergies   Allergen Reactions     Morphine      Prochlorperazine      compazine -dystonic reaction     Seasonal Allergies        Social History     Occupational History     Employer: SELF   Tobacco Use     Smoking status: Never Smoker     Smokeless tobacco: Never Used   Substance and Sexual Activity     Alcohol use: No     Alcohol/week: 10.0 standard drinks     Drug use: No     Sexual activity: Yes     Partners: Male     Birth control/protection: Surgical     Comment:  had vasectomy  Oct. 11, 2002, she had a hysterectomy       Family History   Problem Relation Age of Onset     Neurologic Disorder Mother         migraines     Lipids Mother      Arthritis Mother         rheumatoid     Chronic Obstructive Pulmonary Disease Mother         smoker     Hypertension Father      Lipids Father      Blood Disease Father 51        DVT  at age 51 from probable PE     Neurologic Disorder Father         Muscular Dystrophy     Muscular Dystrophy Brother 19     Diabetes Paternal Grandmother      Diabetes Paternal Grandfather      Cancer Paternal Grandfather         colon     Neurologic Disorder Brother         Muscular Dystrophy  at age 19 from an MI     Cancer Paternal Uncle         colon     Asthma Son      Congenital Anomalies Son         epilepsy hydrocephilis       REVIEW OF SYSTEMS  10 point review systems  "performed otherwise negative as noted as per history of present illness.    Physical Exam:  Vitals: /80   Ht 1.626 m (5' 4\")   Wt 63.5 kg (140 lb)   LMP 02/01/2010   BMI 24.03 kg/m    BMI= Body mass index is 24.03 kg/m .    Constitutional: healthy, alert and no acute distress   Psychiatric: mentation appears normal and affect normal/bright  NEURO: no focal deficits, CMS intact right lower extremity   RESP: Normal with easy respirations and no use of accessory muscles to breathe, no audible wheezing or retractions  CV: Calf soft and nontender to palpation, leg warm   SKIN: No erythema, rashes, excoriation, or breakdown. No evidence of infection.   MUSCULOSKELETAL:    INSPECTION of right knee: No gross deformities, erythema, edema, ecchymosis, atrophy or fasciculations.     PALPATION: Medial posterior and lateral posterior joint line tenderness noted.  No tenderness lateral, anterior and posterior portion of the knee. No specific joint line tenderness on the lateral and medial anterior and middle joint line.  No increased warmth.  No effusion.  No patellofemoral pain with compression and range of motion but there is crepitus.    ROM: Extension full, flexion to 125 . All range of motion without catching, locking or pain.       STRENGTH: 5 out of 5 quad and hamstring.     SPECIAL TEST: Patient has a negative Lachman's negative drawer sign. Patient's knee is stable to varus and valgus stress at 30  of flexion. Patient has a positive Marc's.  Patient also having negative Thessaly test  GAIT: non-antalgic  Lymph: no palpable lymph nodes    Diagnostic Modalities:  Recent Results (from the past 744 hour(s))   MR Knee Right w/o Contrast    Narrative    EXAMINATION: MRI of the right knee without contrast    DATE:  5/13/2022    HISTORY: Knee pain    TECHNIQUE: Multiplanar, multisequence MR imaging of the right knee was  obtained using standard sequences in 3 orthogonal planes without the  use of intravenous or " intra-articular gadolinium contrast.    Comparison: None.    FINDINGS:    In the medial compartment, very subtle free edge and undersurface  irregularity of the posterior horn of the medial meniscus, presumed  degenerative. There is no high-grade or full-thickness cartilage loss  or subchondral edema.    In the lateral compartment, the lateral meniscus is intact. There is  no high-grade or full-thickness cartilage loss or subchondral edema.    In the patellofemoral compartment, there is a focal area of grade 4  cartilage fissuring with subchondral edema along the medial patellar  facet.    The anterior and posterior cruciate ligaments are intact.    The tibial collateral ligament is intact. The anterior iliotibial  band, fibular collateral ligament, biceps femoris tendon, and  popliteus tendons are intact.    There is a small joint effusion. No popliteal cyst. No joint bodies.  Probable intraosseous cyst in the anterior aspect of the proximal  tibia.    The extensor mechanism is intact and normal in appearance.       Impression    IMPRESSION:  1. Small right knee joint effusion.  2. Very subtle free edge and undersurface fraying of the posterior  horn of the right knee medial meniscus, presumed degenerative.  3. Focal area of full-thickness cartilage fissuring along the medial  patellar facet with subchondral edema.  4. The anterior and posterior cruciate ligaments, medial and lateral  supporting structures, and lateral meniscus are intact.    TAWANA MILLER MD         SYSTEM ID:  [id number]     I agree with the above reading.    X-rays done today showing very mild medial and patellofemoral joint space narrowing.  No fracture no dislocation no tumor.  Patella is in central menstrual flow groove.  No fracture no dislocation or tumor.    Independent visualization of the images was performed.    Impression: 1.  Right knee patellofemoral chondromalacia.  2.  Right knee patellofemoral syndrome.  3.  Right knee medial  meniscus tear, possible degenerative and subtle    Plan:  All of the above pertinent physical exam and imaging modalities findings was reviewed with Earlene and her  Ty.    Large Joint Injection/Arthocentesis: R knee joint    Date/Time: 5/18/2022 5:16 PM  Performed by: Jonathan Hameed PA-C  Authorized by: Jonathan Hameed PA-C     Indications:  Pain  Needle Size:  22 G  Guidance: landmark guided    Approach:  Anterolateral  Location:  Knee      Medications:  80 mg triamcinolone 40 MG/ML; 3 mL bupivacaine 0.5 %  Aspirate amount (mL):  0  Procedure discussed: discussed risks, benefits, and alternatives    Consent Given by:  Patient  Timeout: timeout called immediately prior to procedure    Prep: patient was prepped and draped in usual sterile fashion     The skin was prepped with betadine. The patient was in a seated position. I used ramses chloride spray prior to doing the injection.  The patient tolerated the injection well, and there were no complications. The injection site was covered with a Band-Aid.         FOCUSED PLAN:  Patient with MRI showing some chondromalacia in cartilage fissure in the patellofemoral area as well as medial meniscus tear that appears to be mostly degenerative and subtle.  Patient has history and physical exam that point towards both medial meniscus tear as well as patellofemoral syndrome.  Today we decided to do an order of formal physical therapy for a one-time visit to get exercises as she likes to do her own exercises on her own.  Also she is instructed to stop all impact activity as she likes to do a lot of working out.  Patient advised biking elliptical and swimming as best.  Patient is to switch from ibuprofen to Mobic to maximize the amount of medicine she is getting.  Patient is to ice elevate and rest.  Patient can follow-up on an as-needed basis.    Re-x-ray on return: No      This note was dictated with He[employer]ed.    Jonathan Hameed PA-C        Again, thank you for allowing  me to participate in the care of your patient.        Sincerely,        Jonathan Hameed PA-C

## 2022-05-20 ENCOUNTER — HOSPITAL ENCOUNTER (OUTPATIENT)
Dept: PHYSICAL THERAPY | Facility: CLINIC | Age: 52
Setting detail: THERAPIES SERIES
Discharge: HOME | End: 2022-05-20
Attending: PHYSICIAN ASSISTANT

## 2022-05-20 PROCEDURE — 97161 PT EVAL LOW COMPLEX 20 MIN: CPT | Mod: GP | Performed by: PHYSICAL THERAPIST

## 2022-05-20 PROCEDURE — 97110 THERAPEUTIC EXERCISES: CPT | Mod: GP | Performed by: PHYSICAL THERAPIST

## 2022-05-20 PROCEDURE — 97112 NEUROMUSCULAR REEDUCATION: CPT | Mod: GP | Performed by: PHYSICAL THERAPIST

## 2022-05-23 NOTE — PROGRESS NOTES
05/20/22 1500   General Information   Type of Visit Initial OP Ortho PT Evaluation   Start of Care Date 05/20/22   Referring Physician BEN Galaviz   Patient/Family Goals Statement 1 PT visit, learn exercises   Orders Evaluate and Treat   Orders Comment R knee PF chondromalacia and PF alignment and medial meniscal tear. Pt would like to do 1 session and home program   Date of Order 05/18/22   Certification Required? No   Medical Diagnosis chondromalacia of right knee, medial meniscal tear, R PFP   Surgical/Medical history reviewed Yes   Precautions/Limitations no known precautions/limitations   Body Part(s)   Body Part(s) Knee   Presentation and Etiology   Pertinent history of current problem (include personal factors and/or comorbidities that impact the POC) Pt presents to PT for 1x visit for R knee pain. MRI shows: IMPRESSION:  1. Small right knee joint effusion.  2. Very subtle free edge and undersurface fraying of the posterior  horn of the right knee medial meniscus, presumed degenerative.  3. Focal area of full-thickness cartilage fissuring along the medial  patellar facet with subchondral edema.  4. The anterior and posterior cruciate ligaments, medial and lateral  supporting structures, and lateral meniscus are intact. Had R knee injection that helped 75%, also taking IBP.   Impairments A. Pain;B. Decreased WB tolerance;C. Swelling;D. Decreased ROM;E. Decreased flexibility;F. Decreased strength and endurance   Functional Limitations perform activities of daily living;perform required work activities;perform desired leisure / sports activities   Symptom Location R knee pain but also has LBP, R buttock pain, entire R>L LE sx into foot   How/Where did it occur From insidious onset   Onset date of current episode/exacerbation 04/20/22   Chronicity Chronic   Pain rating (0-10 point scale) Best (/10);Worst (/10)   Best (/10) average L knee pain 4/10   Worst (/10) 7-8/10 L knee   Pain quality A. Sharp;B.  Dull;C. Aching   Frequency of pain/symptoms A. Constant   Pain/symptoms are: Other   Pain symptoms comment worst at the end of the day   Pain/symptoms exacerbated by A. Sitting;B. Walking;G. Certain positions;I. Bending;J. ADL;K. Home tasks;L. Work tasks;M. Other   Pain exacerbation comment see LEFS, stairs, pivot, squat   Pain/symptoms eased by C. Rest;I. OTC medication(s)   Pain eased by comment injection helped 75%   Progression of symptoms since onset: Improved  (since injection)   Prior Level of Function   Prior Level of Function-Mobility ind   Prior Level of Function-ADLs ind   Functional Level Prior Comment active, up 16.5 hours per day, on feet 95% of day, exercises/yoga   Current Level of Function   Current Community Support Family/friend caregiver   Patient role/employment history A. 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 No   Have you fallen and had an injury in the past year? No   Is patient a fall risk? No   Abuse Screen (yes response referral indicated)   Feels Unsafe at Home or Work/School no   Feels Threatened by Someone no   Does Anyone Try to Keep You From Having Contact with Others or Doing Things Outside Your Home? no   Physical Signs of Abuse Present no   Knee Objective Findings   Gait/Locomotion no assistive device, no deviation   Foot Position In Standing pes planus B   Observation no pain behaviors at rest   Integumentary  R quad atrophy cmpared to L   Posture fair sitting and standing posture, crossed leg often during sitting. slight kyypotic lumbar spine sitting posture. In sitting top of R foot tingling to toes 6/10, L foot 2/10, R lower leg 4/10, L 2/10, R knee 4/10, L 2/10, R buttock to thigh laterally 6/10. Posture correction with lumbar roll decreased R foot to 2/10, L foot abolished. Hooklying R foot 2/10 L 0/10, prone lying  "aboilshed R foot and R knee, LBP 3/10 12\" wide, prone with pillow under pelvis decreased LBP to 2/10 softball size, better.   Side (if bilateral, select both right and left) Right;Left   Right Knee Extension AROM wnl   Left Knee Extension AROM wnl   Right Knee Flexion AROM wnl   Left Knee Flexion AROM wnl   Right Knee Flexion Strength 5/5   Right Knee Extension Strength Fatigue after 5 reps SLR   Right Hip Abduction Strength 4/5, fatigue after 5 reps   Left Hip Abduction Strength fatigue after 8 reps sidelying hip abduction   Right Quad Set Strength poor   Left Quad Set Strength fair   R VMO Strength poor   L VMO Strength fair   Planned Therapy Interventions   Planned Therapy Interventions ROM;strengthening;stretching;neuromuscular re-education   Planned Therapy Interventions Comment manual therapy if needed   Planned Modality Interventions   Planned Modality Interventions Ultrasound;Electrical stimulation   Planned Modality Interventions Comments if needed   Clinical Impression   Criteria for Skilled Therapeutic Interventions Met yes, treatment indicated   PT Diagnosis chronic R knee pain, R PFP syndrome, chronic LBP wtih R>L lower extermity pain/referral   Influenced by the following impairments pain, weakness, ROM   Functional limitations due to impairments sitting, standing, walking, adl's, bending, see LEFS   Clinical Presentation Stable/Uncomplicated   Clinical Presentation Rationale pt noting improvement since injection   Clinical Decision Making (Complexity) Low complexity   Therapy Frequency other (see comments)  (pt only wants 1 visit for now but may want more later)   Predicted Duration of Therapy Intervention (days/wks) 3 months   Risk & Benefits of therapy have been explained Yes   Patient, Family & other staff in agreement with plan of care Yes   Education Assessment   Preferred Learning Style Listening;Reading;Demonstration;Pictures/video   Barriers to Learning No barriers   ORTHO GOALS   PT Ortho " Eval Goals 1;2   Ortho Goal 1   Goal Identifier HEP   Goal Description Pt to have good understanding of HEP after initial visit to address R knee pain so pt can have improved functional level   Goal Progress pt instructed in HEP at initial visit and reports understanding, pt wanted only 1 visit for now and will see how things go, may want more PT in the future if needed   Target Date 05/20/22   Date Met 05/20/22   Ortho Goal 2   Goal Identifier Function   Goal Description Pt will note a decrease in R knee pain and carry over to improved functional level as measured by LEFS score increase from 27/80 to 34/80 or better   Target Date 08/19/22   Total Evaluation Time   PT Jayy, Low Complexity Minutes (60154) 68

## 2022-06-02 DIAGNOSIS — R68.82 DECREASED LIBIDO: ICD-10-CM

## 2022-06-02 DIAGNOSIS — R53.83 FATIGUE: ICD-10-CM

## 2022-06-02 DIAGNOSIS — F41.9 ANXIETY HYPERVENTILATION: ICD-10-CM

## 2022-06-02 DIAGNOSIS — G47.09 INITIAL INSOMNIA: Primary | ICD-10-CM

## 2022-06-02 DIAGNOSIS — F45.8 ANXIETY HYPERVENTILATION: ICD-10-CM

## 2022-06-03 ENCOUNTER — LAB (OUTPATIENT)
Dept: LAB | Facility: CLINIC | Age: 52
End: 2022-06-03

## 2022-06-03 DIAGNOSIS — F41.9 ANXIETY HYPERVENTILATION: ICD-10-CM

## 2022-06-03 DIAGNOSIS — G47.09 INITIAL INSOMNIA: ICD-10-CM

## 2022-06-03 DIAGNOSIS — R68.82 DECREASED LIBIDO: ICD-10-CM

## 2022-06-03 DIAGNOSIS — R53.83 FATIGUE: ICD-10-CM

## 2022-06-03 DIAGNOSIS — F45.8 ANXIETY HYPERVENTILATION: ICD-10-CM

## 2022-06-03 LAB
ESTRADIOL SERPL-MCNC: 23 PG/ML
PROGEST SERPL-MCNC: 8.8 NG/ML
T3FREE SERPL-MCNC: 2.7 PG/ML (ref 2.3–4.2)
T4 FREE SERPL-MCNC: 0.95 NG/DL (ref 0.76–1.46)
TSH SERPL DL<=0.005 MIU/L-ACNC: 1.37 MU/L (ref 0.4–4)

## 2022-06-03 PROCEDURE — 36415 COLL VENOUS BLD VENIPUNCTURE: CPT

## 2022-06-03 PROCEDURE — 84439 ASSAY OF FREE THYROXINE: CPT

## 2022-06-03 PROCEDURE — 84443 ASSAY THYROID STIM HORMONE: CPT

## 2022-06-03 PROCEDURE — 84403 ASSAY OF TOTAL TESTOSTERONE: CPT

## 2022-06-03 PROCEDURE — 82670 ASSAY OF TOTAL ESTRADIOL: CPT

## 2022-06-03 PROCEDURE — 84481 FREE ASSAY (FT-3): CPT

## 2022-06-03 PROCEDURE — 84144 ASSAY OF PROGESTERONE: CPT

## 2022-06-03 PROCEDURE — 82627 DEHYDROEPIANDROSTERONE: CPT

## 2022-06-06 LAB — DHEA-S SERPL-MCNC: 83 UG/DL (ref 35–430)

## 2022-06-07 LAB — TESTOST SERPL-MCNC: 447 NG/DL (ref 8–60)

## 2022-06-14 RX ORDER — LEVOCETIRIZINE DIHYDROCHLORIDE 5 MG
TABLET ORAL
Qty: 90 | Refills: 1 | Status: CANCELLED

## 2022-06-16 RX ORDER — LEVOCETIRIZINE DIHYDROCHLORIDE 5 MG
TABLET ORAL
Qty: 90 | Refills: 1

## 2022-06-23 NOTE — PROGRESS NOTES
[facility]    Outpatient Physical Therapy Discharge Note  Patient: Earlene Mark  : 1970    Beginning/End Dates of Reporting Period:  22 to 22    Referring Provider: BEN Clark Diagnosis: L sided neck pain with radiating symptoms to LUE     Client Self Report: Pt reports she doesn't feel much better today. She reports she has been using her home traction unit and that laying flat on her back with ice feels the best. She is looking for some stretches and exercises to do moving forward. She also would like to use the traction machine today to go over the amount of pressure to set and how tight to have the collar around her head. Pt has done accupuncture twice since last appointment. No additional updates to report this week.    Objective Measurements:  Objective Measure: Frequency of Symptoms  Details: Never lets up          Goals:  Goal Identifier NDI   Goal Description Pt will score <30% on NDI in order to improve tolerance to daily activities.   Target Date 22   Date Met      Progress (detail required for progress note):       Goal Identifier Symptom frequency   Goal Description Pt will report no symptoms in her neck or LUE for at least 50% of her day in order to care for her son.   Target Date 22   Date Met      Progress (detail required for progress note):       Goal Identifier Symptom intensity   Goal Description Pt will report an average pain intensity of 2/10 or less in order to better tolerate cooking and cleaning.   Target Date 22   Date Met      Progress (detail required for progress note):           Plan:  Discharge from therapy.    Discharge:    Reason for Discharge: Patient was self-pay and wishes to be independent     Discharge Plan: Patient to continue home program.        Bethany Storey, PT, DPT, OCS, CSCS  [facility]  [facility]  [phone]

## 2022-06-28 ENCOUNTER — MYC MEDICAL ADVICE (OUTPATIENT)
Dept: FAMILY MEDICINE | Facility: CLINIC | Age: 52
End: 2022-06-28

## 2022-06-28 DIAGNOSIS — M22.2X1 PATELLOFEMORAL SYNDROME OF RIGHT KNEE: ICD-10-CM

## 2022-06-28 DIAGNOSIS — M94.261 CHONDROMALACIA OF RIGHT KNEE: ICD-10-CM

## 2022-06-28 DIAGNOSIS — S83.241A ACUTE MEDIAL MENISCUS TEAR OF RIGHT KNEE, INITIAL ENCOUNTER: ICD-10-CM

## 2022-06-28 RX ORDER — MELOXICAM 15 MG/1
15 TABLET ORAL DAILY
Qty: 30 TABLET | Refills: 1 | Status: SHIPPED | OUTPATIENT
Start: 2022-06-28 | End: 2022-08-24

## 2022-06-28 NOTE — TELEPHONE ENCOUNTER
Requested Prescriptions   Pending Prescriptions Disp Refills     meloxicam (MOBIC) 15 MG tablet 30 tablet 1     Sig: Take 1 tablet (15 mg) by mouth daily       There is no refill protocol information for this order        Last Written Prescription Date:  5/18/2022  Last Fill Quantity: 30,  # refills: 1   Last office visit: 5/18/2022 with prescribing provider:     Future Office Visit:   Next 5 appointments (look out 90 days)    Jul 18, 2022  5:00 PM  (Arrive by 4:40 PM)  Adult Preventative Visit with Humera Sellers NP  [facility] ([facility] ) [address] [place] [id number] [phone]

## 2022-06-28 NOTE — TELEPHONE ENCOUNTER
"Meloxicam     Future Office Visit:   Next 5 appointments (look out 90 days)    Jul 18, 2022  5:00 PM  (Arrive by 4:40 PM)  Adult Preventative Visit with Humera Sellers NP  [facility] ([facility] ) [address] [place] [id number] [phone]           Requested Prescriptions   Pending Prescriptions Disp Refills     meloxicam (MOBIC) 15 MG tablet 30 tablet 1     Sig: Take 1 tablet (15 mg) by mouth daily       NSAID Medications Failed - 6/28/2022  9:31 AM        Failed - Normal ALT on file in past 12 months     Recent Labs   Lab Test 03/15/19  0814   ALT 38             Failed - Normal AST on file in past 12 months     Recent Labs   Lab Test 03/15/19  0814   AST 30             Failed - Normal CBC on file in past 12 months     Recent Labs   Lab Test 08/17/20  1530 06/16/20  1543 06/17/14  2254   WBC  --   --  7.7   RBC  --   --  4.41   HGB 12.7   < > 14.3   HCT  --   --  41.4   PLT  --   --  312    < > = values in this interval not displayed.                 Failed - Normal serum creatinine on file in past 12 months     Recent Labs   Lab Test 06/17/14  2254   CR 0.76       Ok to refill medication if creatinine is low          Passed - Blood pressure under 140/90 in past 12 months     BP Readings from Last 3 Encounters:   05/18/22 110/80   04/19/22 132/75   12/08/21 117/69                 Passed - Recent (12 mo) or future (30 days) visit within the authorizing provider's specialty     Patient has had an office visit with the authorizing provider or a provider within the authorizing providers department within the previous 12 mos or has a future within next 30 days. See \"Patient Info\" tab in inbasket, or \"Choose Columns\" in Meds & Orders section of the refill encounter.              Passed - Patient is age 6-64 years        Passed - Medication is active on med list        Passed - No active pregnancy on record        Passed - No positive pregnancy test " in past 12 months             Did not pass protocol, forwarding to prescribing provider.     Lexis RHODES, Lead RN, BSN. . .  6/28/2022, 10:47 AM

## 2022-06-28 NOTE — TELEPHONE ENCOUNTER
I reviewed this patient's chart and the referral is appropriate.  GFR is 83 on this patient.  Prescription sent.

## 2022-06-29 NOTE — TELEPHONE ENCOUNTER
06/10/21 0825   Events/Summary/Plan   Events/Summary/Plan MDI, SpO2 check,    Vital Signs   Pulse 78   Respiration 16   Pulse Oximetry 93 %   Oxygen   O2 Delivery Device None - Room Air   Room Air Challenge Pass   Non-Invasive Ventilation [facility]   Nocturnal CPAP or BIPAP CPAP - [facility]   $ System Evaluation Yes      I will route to PCP to address seizure concerns. Berkley Faye, CMA

## 2022-07-13 ENCOUNTER — MYC MEDICAL ADVICE (OUTPATIENT)
Dept: FAMILY MEDICINE | Facility: CLINIC | Age: 52
End: 2022-07-13

## 2022-07-13 DIAGNOSIS — G43.009 MIGRAINE WITHOUT AURA AND WITHOUT STATUS MIGRAINOSUS, NOT INTRACTABLE: ICD-10-CM

## 2022-07-13 NOTE — TELEPHONE ENCOUNTER
Patient asked additional questions via [employer].  Will wait for a response.    SAMANTHA FitzgeraldN, RN

## 2022-07-18 RX ORDER — RIZATRIPTAN BENZOATE 10 MG/1
TABLET ORAL
Qty: 54 TABLET | Refills: 3 | Status: SHIPPED | OUTPATIENT
Start: 2022-07-18 | End: 2022-11-15

## 2022-07-18 NOTE — TELEPHONE ENCOUNTER
Pending Prescriptions:                       Disp   Refills    rizatriptan (MAXALT) 10 MG tablet          54 tab*3        Sig: Take 1/2 to 1 tablet by mouth at onset of migraine,           may repeat in 2 hours -Max 3 tablet(s) in 24           hours    Routing refill request to provider for review/approval because:  Last refill 2/26/2022 for 54 tablets and 3 refills    Bonnie Quintanilla RN

## 2022-08-02 ENCOUNTER — OFFICE VISIT (OUTPATIENT)
Dept: FAMILY MEDICINE | Facility: CLINIC | Age: 52
End: 2022-08-02
Payer: COMMERCIAL

## 2022-08-02 ENCOUNTER — HOSPITAL ENCOUNTER (OUTPATIENT)
Dept: MAMMOGRAPHY | Facility: CLINIC | Age: 52
Discharge: HOME | End: 2022-08-02
Attending: FAMILY MEDICINE | Admitting: FAMILY MEDICINE
Payer: COMMERCIAL

## 2022-08-02 VITALS
BODY MASS INDEX: 24.69 KG/M2 | SYSTOLIC BLOOD PRESSURE: 120 MMHG | HEIGHT: 64 IN | WEIGHT: 144.6 LBS | TEMPERATURE: 97.6 F | DIASTOLIC BLOOD PRESSURE: 72 MMHG

## 2022-08-02 DIAGNOSIS — Z11.51 SCREENING FOR HUMAN PAPILLOMAVIRUS: Primary | ICD-10-CM

## 2022-08-02 DIAGNOSIS — Z12.31 VISIT FOR SCREENING MAMMOGRAM: ICD-10-CM

## 2022-08-02 PROCEDURE — 87624 HPV HI-RISK TYP POOLED RSLT: CPT | Performed by: OBSTETRICS & GYNECOLOGY

## 2022-08-02 PROCEDURE — 99213 OFFICE O/P EST LOW 20 MIN: CPT | Performed by: OBSTETRICS & GYNECOLOGY

## 2022-08-02 PROCEDURE — G0145 SCR C/V CYTO,THINLAYER,RESCR: HCPCS | Performed by: OBSTETRICS & GYNECOLOGY

## 2022-08-02 PROCEDURE — 77067 SCR MAMMO BI INCL CAD: CPT

## 2022-08-02 NOTE — PROGRESS NOTES
ASSESSMENT/PLAN:                                                        1.  Here for BENJAMIN physical.  Today's exam is normal.  I reassured her about that.      We have scheduled her for a mammogram.  We will notify her of Pap results.                                                                      SUBJECTIVE:                                                      She is here for a BENJAMIN physical.  He requires that she have a breast exam and a pelvic exam and a Pap and a mammogram.        Patient Active Problem List   Diagnosis     CARDIOVASCULAR SCREENING; LDL GOAL LESS THAN 160     s/p Hysterectomy, cervical pathology benign, no need for further pap smears     Other insomnia     Migraine without aura and without status migrainosus, not intractable     Does not have health insurance     Chronic idiopathic urticaria     Post-nasal drip     Past Surgical History:   Procedure Laterality Date     COLPOSCOPY,LOOP ELECTRD CERVIX EXCIS       HC CONIZATION CERVIX,KNIFE/LASER  8    FILEMON 3     HC REMOVAL OF TONSILS,12+ Y/O       HYSTERECTOMY, VAGINAL  02/02/10    laparoscopic assisted vaginal.  benign cervix     TEST NOT FOUND  2001    Abdominoplasty (tummy tuck)     Z  DELIVERY ONLY      , Low Cervical       Social History     Tobacco Use     Smoking status: Never Smoker     Smokeless tobacco: Never Used   Substance Use Topics     Alcohol use: No     Alcohol/week: 10.0 standard drinks     Family History   Problem Relation Age of Onset     Neurologic Disorder Mother         migraines     Lipids Mother      Arthritis Mother         rheumatoid     Chronic Obstructive Pulmonary Disease Mother         smoker     Hypertension Father      Lipids Father      Blood Disease Father 51        DVT  at age 51 from probable PE     Neurologic Disorder Father         Muscular Dystrophy     Muscular Dystrophy Brother 19     Diabetes Paternal Grandmother      Diabetes Paternal Grandfather       Cancer Paternal Grandfather         colon     Neurologic Disorder Brother         Muscular Dystrophy  at age 19 from an MI     Cancer Paternal Uncle         colon     Asthma Son      Congenital Anomalies Son         epilepsy hydrocephilis         Current Outpatient Medications   Medication Sig Dispense Refill     albuterol (2.5 MG/3ML) 0.083% neb solution Take 1 vial (2.5 mg) by nebulization every 4 hours as needed for wheezing Hold on file. Will call if needed 30 vial 0     albuterol (PROAIR HFA/PROVENTIL HFA/VENTOLIN HFA) 108 (90 Base) MCG/ACT inhaler Inhale 2 puffs into the lungs every 4 hours as needed for shortness of breath / dyspnea or wheezing 1 Inhaler 0     co-enzyme Q-10 100 MG CAPS capsule Take 100 mg by mouth daily       Cyanocobalamin (VITAMIN B-12) 5000 MCG TBDP Take 1 tablet by mouth daily       cyclobenzaprine (FLEXERIL) 5 MG tablet Take 1 tablet (5 mg) by mouth 3 times daily as needed for muscle spasms 30 tablet 0     DHEA 10 MG TABS Take 1 tablet by mouth daily       estradiol (VAGIFEM) 10 MCG TABS vaginal tablet INSERT 1 TABLET VAGINALLY AT NIGHT FOR 2 WEEKS MAINTENANCE 1 THREE DAYS A WEEK       fluticasone (FLOVENT HFA) 110 MCG/ACT inhaler Inhale 1 puff into the lungs 2 times daily 12 g 3     fluticasone-salmeterol (ADVAIR) 250-50 MCG/DOSE inhaler Inhale 1 puff into the lungs every 12 hours 1 each 3     gabapentin (NEURONTIN) 300 MG capsule Take 1 capsule (300 mg) by mouth daily 90 capsule 1     levocetirizine (XYZAL) 5 MG tablet Take 1 tablet (5 mg) by mouth 2 times daily 60 tablet 0     meloxicam (MOBIC) 15 MG tablet Take 1 tablet (15 mg) by mouth daily 30 tablet 1     MULTIPLE VITAMIN OR TABS 1 TABLET ORALLY DAILY  0     Omega-3 Fatty Acids (OMEGA 3 PO) Take 1,250 mg by mouth 2 times daily       rizatriptan (MAXALT) 10 MG tablet Take 1/2 to 1 tablet by mouth at onset of migraine, may repeat in 2 hours -Max 3 tablet(s) in 24 hours 54 tablet 3     Sodium Chloride-Sodium Bicarb (AYR SALINE  "NASAL RINSE NA)        TESTOSTERONE 2MG        traZODone (DESYREL) 50 MG tablet Take 2-3 tablets (100-150 mg) by mouth At Bedtime Take 2 tablets by mouth daily 220 tablet 3     Zinc 30 MG TABS Take 2 tablets by mouth At Bedtime         ROS:  A 12 point systems review is negative except for what is listed above in the Subjective history.        Wt Readings from Last 3 Encounters:   08/02/22 65.6 kg (144 lb 9.6 oz)   05/18/22 63.5 kg (140 lb)   05/03/22 63.5 kg (140 lb)                     BP Readings from Last 6 Encounters:   08/02/22 120/72   05/18/22 110/80   04/19/22 132/75   12/08/21 117/69   10/15/21 132/80   07/20/21 138/82          OBJECTIVE:                                                    Vital signs: /72   Temp 97.6  F (36.4  C)   Ht 1.623 m (5' 3.9\")   Wt 65.6 kg (144 lb 9.6 oz)   LMP 02/01/2010   BMI 24.90 kg/m           Chest is clear to auscultation. No wheezes, rales or rhonchi heard.  cardiac exam is normal with s1, s2, no murmurs or adventitious sounds.Normal rate and rhythm is heard.     Breast exam is done with my nurse Saranya present.  There are scattered fibrocystic changes in both breasts but no dominant masses.  No adenopathy felt.  No nipple discharge or asymmetry.  Breast exam is normal.    Pelvic exam reveals normal external genitalia and vaginal vault.  Pap obtained of vaginal cuff.  Bimanual exam confirms that there are no masses.            Nakul Vargas MD  [facility]       The above information was dictating using Dragon voice software and as a result there may be some irregularities that were not detected in my review of this note.                                                                        "

## 2022-08-05 LAB
BKR LAB AP GYN ADEQUACY: NORMAL
BKR LAB AP GYN INTERPRETATION: NORMAL
BKR LAB AP HPV REFLEX: NORMAL
BKR LAB AP PREVIOUS ABNORMAL: NORMAL
PATH REPORT.COMMENTS IMP SPEC: NORMAL
PATH REPORT.COMMENTS IMP SPEC: NORMAL
PATH REPORT.RELEVANT HX SPEC: NORMAL

## 2022-08-09 LAB
HUMAN PAPILLOMA VIRUS 16 DNA: NEGATIVE
HUMAN PAPILLOMA VIRUS 18 DNA: NEGATIVE
HUMAN PAPILLOMA VIRUS FINAL DIAGNOSIS: NORMAL
HUMAN PAPILLOMA VIRUS OTHER HR: NEGATIVE

## 2022-08-10 NOTE — PROGRESS NOTES
[facility]    Outpatient Physical Therapy Discharge Note  Patient: Earlene Mark  : 1970    Beginning/End Dates of Reporting Period:  22 to 8/10/22 (pt only seen for initial visit on 22)    Referring Provider: BEN Galaviz    Therapy Diagnosis: chronic R knee pain, R PFP syndrome, chronic LBP wtih R>L lower extermity pain/referral     Client Self Report: Pt presents to PT for 1x visit for R knee pain. MRI shows: IMPRESSION:  1. Small right knee joint effusion.  2. Very subtle free edge and undersurface fraying of the posterior  horn of the right knee medial meniscus, presumed degenerative.  3. Focal area of full-thickness cartilage fissuring along the medial  patellar facet with subchondral edema.  4. The anterior and posterior cruciate ligaments, medial and lateral  supporting structures, and lateral meniscus are intact. Had R knee injection that helped 75%, also taking IBP.    Objective Measurements: see evaluation for details     Goals:  Goal Identifier HEP   Goal Description Pt to have good understanding of HEP after initial visit to address R knee pain so pt can have improved functional level   Target Date 22   Date Met  22   Progress (detail required for progress note): pt instructed in HEP at initial visit and reports understanding, pt wanted only 1 visit for now and will see how things go, may want more PT in the future if needed     Goal Identifier Function   Goal Description Pt will note a decrease in R knee pain and carry over to improved functional level as measured by LEFS score increase from 27/80 to 34/80 or better   Target Date 22   Date Met      Progress (detail required for progress note):  Not applicable as pt only seen 1 visit       Plan:  Discharge from therapy.    Discharge:    Reason for Discharge: Patient has met goal 1. PT has not been notified of  any problems or questions since last visit.    Equipment Issued: none    Discharge Plan: Patient to continue home program.

## 2022-08-23 DIAGNOSIS — S83.241A ACUTE MEDIAL MENISCUS TEAR OF RIGHT KNEE, INITIAL ENCOUNTER: ICD-10-CM

## 2022-08-23 DIAGNOSIS — M94.261 CHONDROMALACIA OF RIGHT KNEE: ICD-10-CM

## 2022-08-23 DIAGNOSIS — M22.2X1 PATELLOFEMORAL SYNDROME OF RIGHT KNEE: ICD-10-CM

## 2022-08-24 RX ORDER — MELOXICAM 15 MG/1
15 TABLET ORAL DAILY
Qty: 30 TABLET | Refills: 1 | Status: SHIPPED | OUTPATIENT
Start: 2022-08-24 | End: 2022-10-31

## 2022-09-08 ENCOUNTER — APPOINTMENT (OUTPATIENT)
Dept: URBAN - METROPOLITAN AREA CLINIC 259 | Age: 52
Setting detail: DERMATOLOGY
End: 2022-09-13

## 2022-09-08 DIAGNOSIS — Z71.89 OTHER SPECIFIED COUNSELING: ICD-10-CM

## 2022-09-08 DIAGNOSIS — D18.0 HEMANGIOMA: ICD-10-CM

## 2022-09-08 DIAGNOSIS — D485 NEOPLASM OF UNCERTAIN BEHAVIOR OF SKIN: ICD-10-CM

## 2022-09-08 DIAGNOSIS — L57.8 OTHER SKIN CHANGES DUE TO CHRONIC EXPOSURE TO NONIONIZING RADIATION: ICD-10-CM

## 2022-09-08 DIAGNOSIS — L82.1 OTHER SEBORRHEIC KERATOSIS: ICD-10-CM

## 2022-09-08 DIAGNOSIS — L70.0 ACNE VULGARIS: ICD-10-CM

## 2022-09-08 DIAGNOSIS — L81.4 OTHER MELANIN HYPERPIGMENTATION: ICD-10-CM

## 2022-09-08 DIAGNOSIS — D22 MELANOCYTIC NEVI: ICD-10-CM

## 2022-09-08 PROBLEM — D48.5 NEOPLASM OF UNCERTAIN BEHAVIOR OF SKIN: Status: ACTIVE | Noted: 2022-09-08

## 2022-09-08 PROBLEM — D22.5 MELANOCYTIC NEVI OF TRUNK: Status: ACTIVE | Noted: 2022-09-08

## 2022-09-08 PROBLEM — D18.01 HEMANGIOMA OF SKIN AND SUBCUTANEOUS TISSUE: Status: ACTIVE | Noted: 2022-09-08

## 2022-09-08 PROCEDURE — 99213 OFFICE O/P EST LOW 20 MIN: CPT | Mod: 25

## 2022-09-08 PROCEDURE — 88305 TISSUE EXAM BY PATHOLOGIST: CPT

## 2022-09-08 PROCEDURE — OTHER PATHOLOGY BILLING: OTHER

## 2022-09-08 PROCEDURE — 11102 TANGNTL BX SKIN SINGLE LES: CPT

## 2022-09-08 PROCEDURE — OTHER PRESCRIPTION: OTHER

## 2022-09-08 PROCEDURE — OTHER COUNSELING: OTHER

## 2022-09-08 PROCEDURE — OTHER MIPS QUALITY: OTHER

## 2022-09-08 PROCEDURE — OTHER BIOPSY BY SHAVE METHOD: OTHER

## 2022-09-08 RX ORDER — TRETIONIN 0.5 MG/G
CREAM TOPICAL
Qty: 1 | Refills: 3 | Status: ERX

## 2022-09-08 ASSESSMENT — LOCATION SIMPLE DESCRIPTION DERM
LOCATION SIMPLE: RIGHT UPPER BACK
LOCATION SIMPLE: UPPER BACK
LOCATION SIMPLE: LEFT CHEEK
LOCATION SIMPLE: LEFT UPPER BACK
LOCATION SIMPLE: LEFT FOREARM

## 2022-09-08 ASSESSMENT — LOCATION DETAILED DESCRIPTION DERM
LOCATION DETAILED: LEFT INFERIOR CENTRAL MALAR CHEEK
LOCATION DETAILED: INFERIOR THORACIC SPINE
LOCATION DETAILED: LEFT MEDIAL UPPER BACK
LOCATION DETAILED: RIGHT SUPERIOR MEDIAL UPPER BACK
LOCATION DETAILED: LEFT PROXIMAL DORSAL FOREARM
LOCATION DETAILED: LEFT INFERIOR MEDIAL UPPER BACK

## 2022-09-08 ASSESSMENT — LOCATION ZONE DERM
LOCATION ZONE: TRUNK
LOCATION ZONE: ARM
LOCATION ZONE: FACE

## 2022-09-08 NOTE — PROCEDURE: BIOPSY BY SHAVE METHOD
Consent: Written consent was obtained and risks were reviewed including but not limited to scarring, infection, bleeding, scabbing, incomplete removal, nerve damage and allergy to anesthesia.
Destruction After The Procedure: No
Anesthesia Type: 1% lidocaine with epinephrine
Silver Nitrate Text: The wound bed was treated with silver nitrate after the biopsy was performed.
Notification Instructions: Patient will be notified of biopsy results. However, patient instructed to call the office if not contacted within 2 weeks.
Additional Anesthesia Volume In Cc (Will Not Render If 0): 0
Electrodesiccation Text: The wound bed was treated with electrodesiccation after the biopsy was performed.
Dressing: bandage
Was A Bandage Applied: Yes
Depth Of Biopsy: dermis
Curettage Text: The wound bed was treated with curettage after the biopsy was performed.
Billing Type: Third-Party Bill
Biopsy Method: double edge Personna blade
Post-Care Instructions: I reviewed with the patient in detail post-care instructions. Patient is to keep the biopsy site dry overnight, and then apply bacitracin twice daily until healed. Patient may apply hydrogen peroxide soaks to remove any crusting.
Hemostasis: Drysol
Biopsy Type: H and E
Detail Level: Detailed
Type Of Destruction Used: Curettage
Information: Selecting Yes will display possible errors in your note based on the variables you have selected. This validation is only offered as a suggestion for you. PLEASE NOTE THAT THE VALIDATION TEXT WILL BE REMOVED WHEN YOU FINALIZE YOUR NOTE. IF YOU WANT TO FAX A PRELIMINARY NOTE YOU WILL NEED TO TOGGLE THIS TO 'NO' IF YOU DO NOT WANT IT IN YOUR FAXED NOTE.
Anesthesia Volume In Cc (Will Not Render If 0): 0.5
Wound Care: Petrolatum
Cryotherapy Text: The wound bed was treated with cryotherapy after the biopsy was performed.
Electrodesiccation And Curettage Text: The wound bed was treated with electrodesiccation and curettage after the biopsy was performed.

## 2022-09-08 NOTE — PROCEDURE: PATHOLOGY BILLING
Immunohistochemistry ([id number] and [id number]) billing is not performed here. Please use the Immunohistochemistry Stain Billing plan to accomplish this. Immunohistochemistry ([id number] and [id number]) billing is not performed here. Please use the Immunohistochemistry Stain Billing plan to accomplish this.

## 2022-09-08 NOTE — PROCEDURE: COUNSELING
Bactrim Counseling:  I discussed with the patient the risks of sulfa antibiotics including but not limited to GI upset, allergic reaction, drug rash, diarrhea, dizziness, photosensitivity, and yeast infections.  Rarely, more serious reactions can occur including but not limited to aplastic anemia, agranulocytosis, methemoglobinemia, blood dyscrasias, liver or kidney failure, lung infiltrates or desquamative/blistering drug rashes.
Detail Level: Generalized
Spironolactone Counseling: Patient advised regarding risks of diarrhea, abdominal pain, hyperkalemia, birth defects (for female patients), liver toxicity and renal toxicity. The patient may need blood work to monitor liver and kidney function and potassium levels while on therapy. The patient verbalized understanding of the proper use and possible adverse effects of spironolactone.  All of the patient's questions and concerns were addressed.
Topical Retinoid counseling:  Patient advised to apply a pea-sized amount only at bedtime and wait 30 minutes after washing their face before applying.  If too drying, patient may add a non-comedogenic moisturizer. The patient verbalized understanding of the proper use and possible adverse effects of retinoids.  All of the patient's questions and concerns were addressed.
Topical Sulfur Applications Counseling: Topical Sulfur Counseling: Patient counseled that this medication may cause skin irritation or allergic reactions.  In the event of skin irritation, the patient was advised to reduce the amount of the drug applied or use it less frequently.   The patient verbalized understanding of the proper use and possible adverse effects of topical sulfur application.  All of the patient's questions and concerns were addressed.
Topical Clindamycin Pregnancy And Lactation Text: This medication is Pregnancy Category B and is considered safe during pregnancy. It is unknown if it is excreted in breast milk.
Minocycline Counseling: Patient advised regarding possible photosensitivity and discoloration of the teeth, skin, lips, tongue and gums.  Patient instructed to avoid sunlight, if possible.  When exposed to sunlight, patients should wear protective clothing, sunglasses, and sunscreen.  The patient was instructed to call the office immediately if the following severe adverse effects occur:  hearing changes, easy bruising/bleeding, severe headache, or vision changes.  The patient verbalized understanding of the proper use and possible adverse effects of minocycline.  All of the patient's questions and concerns were addressed.
Spironolactone Pregnancy And Lactation Text: This medication can cause feminization of the male fetus and should be avoided during pregnancy. The active metabolite is also found in breast milk.
Benzoyl Peroxide Pregnancy And Lactation Text: This medication is Pregnancy Category C. It is unknown if benzoyl peroxide is excreted in breast milk.
Include Pregnancy/Lactation Warning?: No
Aklief counseling:  Patient advised to apply a pea-sized amount only at bedtime and wait 30 minutes after washing their face before applying.  If too drying, patient may add a non-comedogenic moisturizer.  The most commonly reported side effects including irritation, redness, scaling, dryness, stinging, burning, itching, and increased risk of sunburn.  The patient verbalized understanding of the proper use and possible adverse effects of retinoids.  All of the patient's questions and concerns were addressed.
Azelaic Acid Pregnancy And Lactation Text: This medication is considered safe during pregnancy and breast feeding.
Azelaic Acid Counseling: Patient counseled that medicine may cause skin irritation and to avoid applying near the eyes.  In the event of skin irritation, the patient was advised to reduce the amount of the drug applied or use it less frequently.   The patient verbalized understanding of the proper use and possible adverse effects of azelaic acid.  All of the patient's questions and concerns were addressed.
Tetracycline Pregnancy And Lactation Text: This medication is Pregnancy Category D and not consider safe during pregnancy. It is also excreted in breast milk.
Dapsone Pregnancy And Lactation Text: This medication is Pregnancy Category C and is not considered safe during pregnancy or breast feeding.
Erythromycin Counseling:  I discussed with the patient the risks of erythromycin including but not limited to GI upset, allergic reaction, drug rash, diarrhea, increase in liver enzymes, and yeast infections.
Tazorac Pregnancy And Lactation Text: This medication is not safe during pregnancy. It is unknown if this medication is excreted in breast milk.
Sarecycline Counseling: Patient advised regarding possible photosensitivity and discoloration of the teeth, skin, lips, tongue and gums.  Patient instructed to avoid sunlight, if possible.  When exposed to sunlight, patients should wear protective clothing, sunglasses, and sunscreen.  The patient was instructed to call the office immediately if the following severe adverse effects occur:  hearing changes, easy bruising/bleeding, severe headache, or vision changes.  The patient verbalized understanding of the proper use and possible adverse effects of sarecycline.  All of the patient's questions and concerns were addressed.
High Dose Vitamin A Pregnancy And Lactation Text: High dose vitamin A therapy is contraindicated during pregnancy and breast feeding.
Tetracycline Counseling: Patient counseled regarding possible photosensitivity and increased risk for sunburn.  Patient instructed to avoid sunlight, if possible.  When exposed to sunlight, patients should wear protective clothing, sunglasses, and sunscreen.  The patient was instructed to call the office immediately if the following severe adverse effects occur:  hearing changes, easy bruising/bleeding, severe headache, or vision changes.  The patient verbalized understanding of the proper use and possible adverse effects of tetracycline.  All of the patient's questions and concerns were addressed. Patient understands to avoid pregnancy while on therapy due to potential birth defects.
Detail Level: Zone
Birth Control Pills Counseling: Birth Control Pill Counseling: I discussed with the patient the potential side effects of OCPs including but not limited to increased risk of stroke, heart attack, thrombophlebitis, deep venous thrombosis, hepatic adenomas, breast changes, GI upset, headaches, and depression.  The patient verbalized understanding of the proper use and possible adverse effects of OCPs. All of the patient's questions and concerns were addressed.
Azithromycin Pregnancy And Lactation Text: This medication is considered safe during pregnancy and is also secreted in breast milk.
Doxycycline Counseling:  Patient counseled regarding possible photosensitivity and increased risk for sunburn.  Patient instructed to avoid sunlight, if possible.  When exposed to sunlight, patients should wear protective clothing, sunglasses, and sunscreen.  The patient was instructed to call the office immediately if the following severe adverse effects occur:  hearing changes, easy bruising/bleeding, severe headache, or vision changes.  The patient verbalized understanding of the proper use and possible adverse effects of doxycycline.  All of the patient's questions and concerns were addressed.
Detail Level: Detailed
Topical Sulfur Applications Pregnancy And Lactation Text: This medication is Pregnancy Category C and has an unknown safety profile during pregnancy. It is unknown if this topical medication is excreted in breast milk.
Doxycycline Pregnancy And Lactation Text: This medication is Pregnancy Category D and not consider safe during pregnancy. It is also excreted in breast milk but is considered safe for shorter treatment courses.
Benzoyl Peroxide Counseling: Patient counseled that medicine may cause skin irritation and bleach clothing.  In the event of skin irritation, the patient was advised to reduce the amount of the drug applied or use it less frequently.   The patient verbalized understanding of the proper use and possible adverse effects of benzoyl peroxide.  All of the patient's questions and concerns were addressed.
Topical Clindamycin Counseling: Patient counseled that this medication may cause skin irritation or allergic reactions.  In the event of skin irritation, the patient was advised to reduce the amount of the drug applied or use it less frequently.   The patient verbalized understanding of the proper use and possible adverse effects of clindamycin.  All of the patient's questions and concerns were addressed.
Winlevi Counseling:  I discussed with the patient the risks of topical clascoterone including but not limited to erythema, scaling, itching, and stinging. Patient voiced their understanding.
Isotretinoin Counseling: Patient should get monthly blood tests, not donate blood, not drive at night if vision affected, not share medication, and not undergo elective surgery for 6 months after tx completed. Side effects reviewed, pt to contact office should one occur.
Detail Level: Simple
Tazorac Counseling:  Patient advised that medication is irritating and drying.  Patient may need to apply sparingly and wash off after an hour before eventually leaving it on overnight.  The patient verbalized understanding of the proper use and possible adverse effects of tazorac.  All of the patient's questions and concerns were addressed.
Bactrim Pregnancy And Lactation Text: This medication is Pregnancy Category D and is known to cause fetal risk.  It is also excreted in breast milk.
Winlevi Pregnancy And Lactation Text: This medication is considered safe during pregnancy and breastfeeding.
Erythromycin Pregnancy And Lactation Text: This medication is Pregnancy Category B and is considered safe during pregnancy. It is also excreted in breast milk.
Birth Control Pills Pregnancy And Lactation Text: This medication should be avoided if pregnant and for the first 30 days post-partum.
Azithromycin Counseling:  I discussed with the patient the risks of azithromycin including but not limited to GI upset, allergic reaction, drug rash, diarrhea, and yeast infections.
High Dose Vitamin A Counseling: Side effects reviewed, pt to contact office should one occur.
Topical Retinoid Pregnancy And Lactation Text: This medication is Pregnancy Category C. It is unknown if this medication is excreted in breast milk.
Isotretinoin Pregnancy And Lactation Text: This medication is Pregnancy Category X and is considered extremely dangerous during pregnancy. It is unknown if it is excreted in breast milk.
Aklief Pregnancy And Lactation Text: It is unknown if this medication is safe to use during pregnancy.  It is unknown if this medication is excreted in breast milk.  Breastfeeding women should use the topical cream on the smallest area of the skin for the shortest time needed while breastfeeding.  Do not apply to nipple and areola.
Dapsone Counseling: I discussed with the patient the risks of dapsone including but not limited to hemolytic anemia, agranulocytosis, rashes, methemoglobinemia, kidney failure, peripheral neuropathy, headaches, GI upset, and liver toxicity.  Patients who start dapsone require monitoring including baseline LFTs and weekly CBCs for the first month, then every month thereafter.  The patient verbalized understanding of the proper use and possible adverse effects of dapsone.  All of the patient's questions and concerns were addressed.

## 2022-09-08 NOTE — HPI: FULL BODY SKIN EXAMINATION
How Severe Are Your Spot(S)?: mild
What Type Of Note Output Would You Prefer (Optional)?: Standard Output
What Is The Reason For Today's Visit?: Initial Full Body Skin Examination
What Is The Reason For Today's Visit? (Being Monitored For X): concerning skin lesions on an annual basis
Additional History: The pt is unsure of how long it has been there and has had no treatment.

## 2022-09-14 ENCOUNTER — RX ONLY (RX ONLY)
Age: 52
End: 2022-09-14

## 2022-09-14 RX ORDER — METRONIDAZOLE 7.5 MG/G
CREAM TOPICAL
Qty: 45 | Refills: 0 | Status: ERX | COMMUNITY
Start: 2022-09-14

## 2022-09-19 ENCOUNTER — RX ONLY (RX ONLY)
Age: 52
End: 2022-09-19

## 2022-09-19 RX ORDER — METRONIDAZOLE 7.5 MG/G
CREAM TOPICAL
Qty: 1 | Refills: 2 | Status: ERX | COMMUNITY
Start: 2022-09-19

## 2022-09-19 RX ORDER — TRETIONIN 0.5 MG/G
CREAM TOPICAL
Qty: 1 | Refills: 3 | Status: ERX

## 2022-10-05 ENCOUNTER — RX ONLY (RX ONLY)
Age: 52
End: 2022-10-05

## 2022-10-05 RX ORDER — TRETIONIN 0.5 MG/G
CREAM TOPICAL
Qty: 1 | Refills: 3 | Status: ERX

## 2022-10-05 NOTE — PROGRESS NOTES
ANTICOAGULATION CLINIC REFERRAL RENEWAL REQUEST       An annual renewal order is required for all patients referred to [facility].?  Please review and sign the pended referral order for Feng Wynne.       ANTICOAGULATION SUMMARY      Warfarin indication(s)   Atrial Fibrillation    Mechanical heart valve present?  Mechanical MVR       Current goal range   INR: 2.5-3.5     Goal appropriate for indication? Goal INR 2.5-3.5 standard for indication(s) above     Time in Therapeutic Range (TTR)  (Goal > 60%) 95.9%       Office visit with referring provider's group within last year yes on 4/13/22       Natali Mondragon RN  [facility]     Earlene is a 51 year old who is being evaluated via a billable telephone visit.      What phone number would you like to be contacted at? [phone]  How would you like to obtain your AVS? Leslyhart    Assessment & Plan   (G43.009) Migraine without aura and without status migrainosus, not intractable  (primary encounter diagnosis)  Comment: Her migraines have been stable and responds well to her prescription medication that she uses.  Plan: She does not need refills.    (G47.09) Other insomnia  Comment: Trazodone has worked well for her for the past 18 to 20 years.  Occasionally she will need to take a third dose if she wakes up to help her disabled son but otherwise it works well at taking the 50 mg dose.  Plan: traZODone (DESYREL) 50 MG tablet        We adjusted her prescription dose to reflect that she occasionally takes a 3rd tablet so she will not run out of medication early.    (Z12.31) Visit for screening mammogram  Comment: She is due for her mammogram screen.  Plan: *MA Screening Digital Bilateral        Since she has no insurance she would be a candidate for the BENJAMIN program.  I will ask  to see if they can get her hooked up with that.    (Z12.11) Colon cancer screening  Comment: She is also never had a colon cancer screening.  Plan: COLOGUARD([employer])        She would like to do the Cologuard so I will make that referral for that.    (Z13.220) Lipid screening  Comment: She is overdue for lipid screening.   Plan: Lipid panel reflex to direct LDL Fasting         I placed a standing order for this to  in 6 months.  We will see if she has insurance by then.    (Z11.4) Screening for human immunodeficiency virus without presence of risk factors  Comment: Is agreeable to the CDC recommendations for HIV screening  Plan: HIV Antigen Antibody Combo        Placed this order which will  in 6 months and hopefully at that time she will have insurance.     (Z11.59) Encounter for hepatitis  C screening test for low risk patient  Comment: Patient is agreeable to the CDC recommendations for screening  Plan: Hepatitis C antibody        Again order placed to  and 6 months    (L50.1) Chronic idiopathic urticaria  Comment: She uses levocetirizine for her chronic idiopathic urticaria.  Plan: She can use this through this spring season for her other allergy symptoms also.       25 minutes spent on the date of the encounter doing chart review, history and exam, documentation and further activities per the note        Return in about 6 months (around 2022) for Routine preventive and labs..    Electronically signed by:  Kushal Linda M.D.  2022    Marlin Henao is a 51 year old who presents for the following health issues     History of Present Illness       Reason for visit:  Med check        We did review all of her meds and updated all of them.  She is good on her refills.  No new issues.  She occasionally does need to use a third trazodone to help her fall back to sleep if she wakes up in the middle the night to help her disabled son.  He often has seizures in the middle of the night and she has to attend to him when he has those.  She has been running out of her trazodone when she has to use extra like this so we we will update her prescription appropriately.  She is having some neck issues with pain and numbness but no weakness.  She has had a steroid injection and will be following up with the spine clinic for further recommendations and treatment.  She has been doing some home traction and mobility exercises which she does feel is helpful.    Review of Systems   Constitutional, HEENT, cardiovascular, pulmonary, gi and gu systems are negative, except as otherwise noted.      Objective           Vitals:  No vitals were obtained today due to virtual visit.    Physical Exam   healthy, alert and no distress  PSYCH: Alert and oriented times 3; coherent speech, normal   rate and volume,  able to articulate logical thoughts, able   to abstract reason, no tangential thoughts, no hallucinations   or delusions  Her affect is normal  RESP: No cough, no audible wheezing, able to talk in full sentences  Remainder of exam unable to be completed due to telephone visits    Is once patient is due for labs and some other health maintenance issues but will delay these until she has health insurance.            Phone call duration: 25 minutes

## 2022-10-09 ENCOUNTER — HEALTH MAINTENANCE LETTER (OUTPATIENT)
Age: 52
End: 2022-10-09

## 2022-10-24 ENCOUNTER — TELEPHONE (OUTPATIENT)
Dept: NEUROSURGERY | Facility: CLINIC | Age: 52
End: 2022-10-24

## 2022-10-24 DIAGNOSIS — M54.12 CERVICAL RADICULOPATHY: ICD-10-CM

## 2022-10-24 NOTE — TELEPHONE ENCOUNTER
"Last Visit: 5/3/22    Next Visit: none    Name of Provider: Luiza Crow PA-C     Assessment:   Patient was last seen in clinic by Luiza Crow PA-C 5/3/22 for left sided neck pain and left C7 radiculopathy. Spoke to patient.She reports a bit of a \"flare up.\"  About 4-5 days ago she noticed an increase of N/T, pain to left side of neck radiating to left shoulder, down left arm and into left hand. Described pain as shooting and burning. Denies any UE weakness. reports some muscle fatigue in her left shoulder and feels like \"hot pokers\" being placed there. Patient denies any new symptoms. Patient can't recall doing anything to aggravate or exacerbate her symptoms. She does work out 5 time a week but is very conscientious about what she is doing and how much weight she is lifting.     Luiza prescribed Gabapentin and patient states she is taking 1 tab at  mg. Should dose be adjusted? Patient is open to adjustments if needed.     Patient is also using ice, and taking ibuprofen with little relief. Reports she has had medrol dose packs in the past with little benefit and undesirable side effects like insomnia. She states that her pain is better in a laying position and when sitting symptoms and pain increase.      Attended PT with cervical traction. Has a home rock cervical traction unit that she uses and is helpful. Patient is seeing Chiro, acupuncture, massage therapy. She has no interest in any surgical intervention.     Patient will need Gabapentin refilled.     Recommendation given:  Informed patient that message will be routed to Luiza for review.     Action needed from provider: Do you have any further recommendations? Would you like to see her back in clinic? Do you feel she needs her Gabapentin adjusted ? She will need a refill too.                       "

## 2022-10-24 NOTE — TELEPHONE ENCOUNTER
Patient having increased pain, wants to know if she can increase the amount of Gabapentin, or if she can take one tablet in the am, and one in the evening. Patient will also need a refill of the medication as well.

## 2022-10-26 NOTE — TELEPHONE ENCOUNTER
Okay for gabapentin refill, if she wants to increase it to 600 before bed that would be reasonable.  Otherwise she can simply monitor her symptoms for a week or 2 and see if they are improving if is not improving she can certainly follow-up in clinic for further evaluation.  I agree that continuing traction and conservative therapies is good as well.

## 2022-10-27 RX ORDER — GABAPENTIN 300 MG/1
600 CAPSULE ORAL DAILY
Qty: 180 CAPSULE | Refills: 1 | Status: SHIPPED | OUTPATIENT
Start: 2022-10-27

## 2022-10-27 NOTE — TELEPHONE ENCOUNTER
Spoke to patient. Reviewed Luiza Crow PA-C recommendations/verbal order, see his below note. Patient verbalized understanding and in agreement with plan. Gabapentin Rx sent to patient's [facility] in [place].

## 2022-10-31 DIAGNOSIS — M94.261 CHONDROMALACIA OF RIGHT KNEE: ICD-10-CM

## 2022-10-31 DIAGNOSIS — M22.2X1 PATELLOFEMORAL SYNDROME OF RIGHT KNEE: ICD-10-CM

## 2022-10-31 DIAGNOSIS — S83.241A ACUTE MEDIAL MENISCUS TEAR OF RIGHT KNEE, INITIAL ENCOUNTER: ICD-10-CM

## 2022-10-31 RX ORDER — MELOXICAM 15 MG/1
15 TABLET ORAL DAILY
Qty: 30 TABLET | Refills: 1 | Status: SHIPPED | OUTPATIENT
Start: 2022-10-31 | End: 2023-01-10

## 2022-11-14 ENCOUNTER — TELEPHONE (OUTPATIENT)
Dept: FAMILY MEDICINE | Facility: CLINIC | Age: 52
End: 2022-11-14

## 2022-11-14 DIAGNOSIS — G43.009 MIGRAINE WITHOUT AURA AND WITHOUT STATUS MIGRAINOSUS, NOT INTRACTABLE: ICD-10-CM

## 2022-11-14 NOTE — TELEPHONE ENCOUNTER
Reason for Call:  Other prescription    Detailed comments: Pharmacy needs a new prescription for rizatriptan 10 MG quantity of 54    Phone Number Patient can be reached at: Other phone number:  [phone]    Best Time: anytime    Can we leave a detailed message on this number? YES    Call taken on 11/14/2022 at 3:58 PM by Carina Christopher

## 2022-11-15 RX ORDER — RIZATRIPTAN BENZOATE 10 MG/1
TABLET ORAL
Qty: 54 TABLET | Refills: 3 | Status: SHIPPED | OUTPATIENT
Start: 2022-11-15 | End: 2022-11-23

## 2022-11-23 ENCOUNTER — MYC MEDICAL ADVICE (OUTPATIENT)
Dept: FAMILY MEDICINE | Facility: CLINIC | Age: 52
End: 2022-11-23

## 2022-11-23 DIAGNOSIS — G43.009 MIGRAINE WITHOUT AURA AND WITHOUT STATUS MIGRAINOSUS, NOT INTRACTABLE: ICD-10-CM

## 2022-11-23 RX ORDER — RIZATRIPTAN BENZOATE 10 MG/1
TABLET ORAL
Qty: 54 TABLET | Refills: 3 | Status: SHIPPED | OUTPATIENT
Start: 2022-11-23 | End: 2023-03-14

## 2022-11-30 ENCOUNTER — MEDICAL CORRESPONDENCE (OUTPATIENT)
Dept: HEALTH INFORMATION MANAGEMENT | Facility: CLINIC | Age: 52
End: 2022-11-30

## 2022-12-05 ENCOUNTER — LAB (OUTPATIENT)
Dept: LAB | Facility: CLINIC | Age: 52
End: 2022-12-05

## 2022-12-05 ENCOUNTER — TELEPHONE (OUTPATIENT)
Dept: NEUROSURGERY | Facility: CLINIC | Age: 52
End: 2022-12-05

## 2022-12-05 DIAGNOSIS — Z01.812 ENCOUNTER FOR PRE-OPERATIVE LABORATORY TESTING: ICD-10-CM

## 2022-12-05 LAB
ERYTHROCYTE [DISTWIDTH] IN BLOOD BY AUTOMATED COUNT: 12.1 % (ref 10–15)
HCT VFR BLD AUTO: 38.2 % (ref 35–47)
HGB BLD-MCNC: 12.8 G/DL (ref 11.7–15.7)
MCH RBC QN AUTO: 31.6 PG (ref 26.5–33)
MCHC RBC AUTO-ENTMCNC: 33.5 G/DL (ref 31.5–36.5)
MCV RBC AUTO: 94 FL (ref 78–100)
PLATELET # BLD AUTO: 342 10E3/UL (ref 150–450)
RBC # BLD AUTO: 4.05 10E6/UL (ref 3.8–5.2)
WBC # BLD AUTO: 9.5 10E3/UL (ref 4–11)

## 2022-12-05 PROCEDURE — 36415 COLL VENOUS BLD VENIPUNCTURE: CPT

## 2022-12-05 PROCEDURE — 85027 COMPLETE CBC AUTOMATED: CPT

## 2022-12-05 NOTE — TELEPHONE ENCOUNTER
Gabapentin prescribed by Luiza Crow PA-C. Will route to provider to see if weaning is necessary since patient is only taking 1 tablet at bedtime.

## 2022-12-07 NOTE — TELEPHONE ENCOUNTER
Per Luiza Crow PA-C if patient is only taking the 1 300mg tablet at bedtime, she can simply stop no weaning necessary.     Updated patient via [employer].

## 2022-12-29 ENCOUNTER — MYC MEDICAL ADVICE (OUTPATIENT)
Dept: FAMILY MEDICINE | Facility: CLINIC | Age: 52
End: 2022-12-29

## 2022-12-29 DIAGNOSIS — Z12.11 COLON CANCER SCREENING: Primary | ICD-10-CM

## 2022-12-31 DIAGNOSIS — Z12.11 COLON CANCER SCREENING: ICD-10-CM

## 2023-01-05 ENCOUNTER — LAB (OUTPATIENT)
Dept: LAB | Facility: CLINIC | Age: 53
End: 2023-01-05

## 2023-01-05 DIAGNOSIS — N95.1 SYMPTOMATIC MENOPAUSAL OR FEMALE CLIMACTERIC STATES: ICD-10-CM

## 2023-01-05 DIAGNOSIS — G47.09 INITIAL INSOMNIA: ICD-10-CM

## 2023-01-05 DIAGNOSIS — Z11.59 ENCOUNTER FOR HEPATITIS C SCREENING TEST FOR LOW RISK PATIENT: ICD-10-CM

## 2023-01-05 DIAGNOSIS — R53.83 FATIGUE: ICD-10-CM

## 2023-01-05 DIAGNOSIS — R23.2 HOT FLASHES: ICD-10-CM

## 2023-01-05 DIAGNOSIS — Z11.4 SCREENING FOR HUMAN IMMUNODEFICIENCY VIRUS WITHOUT PRESENCE OF RISK FACTORS: ICD-10-CM

## 2023-01-05 DIAGNOSIS — Z13.220 LIPID SCREENING: ICD-10-CM

## 2023-01-05 DIAGNOSIS — G47.09 INITIAL INSOMNIA: Primary | ICD-10-CM

## 2023-01-05 LAB
CHOLEST SERPL-MCNC: 184 MG/DL
ESTRADIOL SERPL-MCNC: 77 PG/ML
FASTING STATUS PATIENT QL REPORTED: NO
HCV AB SERPL QL IA: NONREACTIVE
HDLC SERPL-MCNC: 75 MG/DL
HIV 1+2 AB+HIV1 P24 AG SERPL QL IA: NONREACTIVE
LDLC SERPL CALC-MCNC: 97 MG/DL
NONHDLC SERPL-MCNC: 109 MG/DL
PROGEST SERPL-MCNC: 1.5 NG/ML
T3FREE SERPL-MCNC: 2.7 PG/ML (ref 2–4.4)
T4 FREE SERPL-MCNC: 1.04 NG/DL (ref 0.76–1.46)
TRIGL SERPL-MCNC: 60 MG/DL
TSH SERPL DL<=0.005 MIU/L-ACNC: 2.16 MU/L (ref 0.4–4)

## 2023-01-05 PROCEDURE — 84481 FREE ASSAY (FT-3): CPT

## 2023-01-05 PROCEDURE — 82627 DEHYDROEPIANDROSTERONE: CPT

## 2023-01-05 PROCEDURE — 36415 COLL VENOUS BLD VENIPUNCTURE: CPT

## 2023-01-05 PROCEDURE — 84403 ASSAY OF TOTAL TESTOSTERONE: CPT

## 2023-01-05 PROCEDURE — 84439 ASSAY OF FREE THYROXINE: CPT

## 2023-01-05 PROCEDURE — 84443 ASSAY THYROID STIM HORMONE: CPT

## 2023-01-05 PROCEDURE — 86803 HEPATITIS C AB TEST: CPT

## 2023-01-05 PROCEDURE — 87389 HIV-1 AG W/HIV-1&-2 AB AG IA: CPT

## 2023-01-05 PROCEDURE — 84144 ASSAY OF PROGESTERONE: CPT

## 2023-01-05 PROCEDURE — 80061 LIPID PANEL: CPT

## 2023-01-05 PROCEDURE — 82670 ASSAY OF TOTAL ESTRADIOL: CPT

## 2023-01-06 LAB
DHEA-S SERPL-MCNC: 43 UG/DL (ref 35–430)
TESTOST SERPL-MCNC: 73 NG/DL (ref 8–60)

## 2023-01-10 ENCOUNTER — TELEPHONE (OUTPATIENT)
Dept: ORTHOPEDICS | Facility: CLINIC | Age: 53
End: 2023-01-10

## 2023-01-10 DIAGNOSIS — S83.241A ACUTE MEDIAL MENISCUS TEAR OF RIGHT KNEE, INITIAL ENCOUNTER: ICD-10-CM

## 2023-01-10 DIAGNOSIS — M22.2X1 PATELLOFEMORAL SYNDROME OF RIGHT KNEE: ICD-10-CM

## 2023-01-10 DIAGNOSIS — M94.261 CHONDROMALACIA OF RIGHT KNEE: ICD-10-CM

## 2023-01-10 RX ORDER — MELOXICAM 15 MG/1
15 TABLET ORAL DAILY
Qty: 30 TABLET | Refills: 1 | Status: SHIPPED | OUTPATIENT
Start: 2023-01-10 | End: 2023-01-10

## 2023-01-10 NOTE — TELEPHONE ENCOUNTER
I saw this patient less than a year ago and her GFR is 83.  I am okay with refill.  I sent the prescription.

## 2023-01-10 NOTE — TELEPHONE ENCOUNTER
Received a fax from [email or web address] asking for a 90 days supply for the mobic.    Please send a new script if you are fine with this or let me know if it is denied    Sherice/BESSY

## 2023-01-11 RX ORDER — MELOXICAM 15 MG/1
15 TABLET ORAL DAILY
Qty: 90 TABLET | Refills: 0 | Status: SHIPPED | OUTPATIENT
Start: 2023-01-11 | End: 2023-08-07

## 2023-01-19 LAB — NONINV COLON CA DNA+OCC BLD SCRN STL QL: NEGATIVE

## 2023-01-23 ENCOUNTER — MYC MEDICAL ADVICE (OUTPATIENT)
Dept: FAMILY MEDICINE | Facility: CLINIC | Age: 53
End: 2023-01-23

## 2023-01-23 DIAGNOSIS — M20.42 HAMMER TOE OF LEFT FOOT: ICD-10-CM

## 2023-01-23 DIAGNOSIS — M77.9 ACUTE CALCIFIC PERIARTHRITIS: Primary | ICD-10-CM

## 2023-01-23 DIAGNOSIS — M77.42 METATARSALGIA OF LEFT FOOT: ICD-10-CM

## 2023-02-02 ENCOUNTER — E-VISIT (OUTPATIENT)
Dept: URGENT CARE | Facility: CLINIC | Age: 53
End: 2023-02-02

## 2023-02-02 DIAGNOSIS — B96.89 ACUTE BACTERIAL SINUSITIS: Primary | ICD-10-CM

## 2023-02-02 DIAGNOSIS — J01.90 ACUTE BACTERIAL SINUSITIS: Primary | ICD-10-CM

## 2023-02-02 PROCEDURE — 99421 OL DIG E/M SVC 5-10 MIN: CPT | Performed by: FAMILY MEDICINE

## 2023-02-02 NOTE — PATIENT INSTRUCTIONS
Dear Earlene Mark    After reviewing your responses, I've been able to diagnose you with Acute bacterial sinusitis.      Based on your responses and diagnosis, I have prescribed   Orders Placed This Encounter     amoxicillin-clavulanate (AUGMENTIN) 875-125 MG tablet    to treat your symptoms. I have sent this to your pharmacy.?     It is also important to stay well hydrated, get lots of rest and take over-the-counter decongestants,?tylenol?or ibuprofen if you?are able to?take those medications per your primary care provider to help relieve discomfort.?     It is important that you take?all of?your prescribed medication even if your symptoms are improving after a few doses.? Taking?all of?your medicine helps prevent the symptoms from returning.?     If your symptoms worsen, you develop severe headache, vomiting, high fever (>102), or are not improving in 7 days, please contact your primary care provider for an appointment or visit any of our convenient Walk-in Care or Urgent Care Centers to be seen which can be found on our website?here.?     Thanks again for choosing?us?as your health care partner,?   ?  Tami Palacio MD?

## 2023-03-14 DIAGNOSIS — G43.009 MIGRAINE WITHOUT AURA AND WITHOUT STATUS MIGRAINOSUS, NOT INTRACTABLE: ICD-10-CM

## 2023-03-14 RX ORDER — RIZATRIPTAN BENZOATE 10 MG/1
TABLET ORAL
Qty: 54 TABLET | Refills: 3 | Status: SHIPPED | OUTPATIENT
Start: 2023-03-14 | End: 2024-01-30

## 2023-03-14 NOTE — TELEPHONE ENCOUNTER
Pharmacy calling in to check on refill request. No request in computer. Asks to process it now if possible.

## 2023-03-14 NOTE — TELEPHONE ENCOUNTER
"Requested Prescriptions   Pending Prescriptions Disp Refills    rizatriptan (MAXALT) 10 MG tablet 54 tablet 3     Sig: Take 1/2 to 1 tablet by mouth at onset of migraine, may repeat in 2 hours -Max 3 tablet(s) in 24 hours       Serotonin Agonists Failed - 3/14/2023  9:57 AM        Failed - Serotonin Agonist request needs review.     Please review patient's record. If patient has had 8 or more treatments in the past month, please forward to provider.          Passed - Blood pressure under 140/90 in past 12 months     BP Readings from Last 3 Encounters:   08/02/22 120/72   05/18/22 110/80   04/19/22 132/75                 Passed - Recent (12 mo) or future (30 days) visit within the authorizing provider's specialty     Patient has had an office visit with the authorizing provider or a provider within the authorizing providers department within the previous 12 mos or has a future within next 30 days. See \"Patient Info\" tab in inbasket, or \"Choose Columns\" in Meds & Orders section of the refill encounter.              Passed - Medication is active on med list        Passed - Patient is age 18 or older        Passed - No active pregnancy on record        Passed - No positive pregnancy test in past 12 months             "

## 2023-03-15 NOTE — TELEPHONE ENCOUNTER
Patient is due for an office visit.  She hasn't been seen in the office in 3 years. Please schedule a yearly visit and med recheck.    Electronically signed by:  Kushal Linda M.D.  3/14/2023

## 2023-03-25 ENCOUNTER — HEALTH MAINTENANCE LETTER (OUTPATIENT)
Age: 53
End: 2023-03-25

## 2023-04-21 DIAGNOSIS — G47.09 OTHER INSOMNIA: ICD-10-CM

## 2023-04-21 RX ORDER — TRAZODONE HYDROCHLORIDE 50 MG/1
TABLET, FILM COATED ORAL
Qty: 180 TABLET | Refills: 0 | Status: SHIPPED | OUTPATIENT
Start: 2023-04-21 | End: 2023-07-24

## 2023-04-21 NOTE — TELEPHONE ENCOUNTER
Prescription approved per [facility] Refill Protocol.  Rose Mary Delgado RN on 4/21/2023 at 3:28 PM

## 2023-06-20 DIAGNOSIS — R23.2 FLUSHING: ICD-10-CM

## 2023-06-20 DIAGNOSIS — G47.09 INITIAL INSOMNIA: Primary | ICD-10-CM

## 2023-06-20 DIAGNOSIS — N95.1 SYMPTOMATIC MENOPAUSAL OR FEMALE CLIMACTERIC STATES: ICD-10-CM

## 2023-06-21 ENCOUNTER — LAB (OUTPATIENT)
Dept: LAB | Facility: CLINIC | Age: 53
End: 2023-06-21

## 2023-06-21 DIAGNOSIS — G47.09 INITIAL INSOMNIA: ICD-10-CM

## 2023-06-21 DIAGNOSIS — N95.1 SYMPTOMATIC MENOPAUSAL OR FEMALE CLIMACTERIC STATES: ICD-10-CM

## 2023-06-21 DIAGNOSIS — R23.2 FLUSHING: ICD-10-CM

## 2023-06-21 LAB
ESTRADIOL SERPL-MCNC: <5 PG/ML
PROGEST SERPL-MCNC: 1 NG/ML

## 2023-06-21 PROCEDURE — 84403 ASSAY OF TOTAL TESTOSTERONE: CPT

## 2023-06-21 PROCEDURE — 84144 ASSAY OF PROGESTERONE: CPT

## 2023-06-21 PROCEDURE — 82670 ASSAY OF TOTAL ESTRADIOL: CPT

## 2023-06-21 PROCEDURE — 36415 COLL VENOUS BLD VENIPUNCTURE: CPT

## 2023-06-26 LAB — TESTOST SERPL-MCNC: 166 NG/DL (ref 8–60)

## 2023-07-18 NOTE — PROGRESS NOTES
Earlene Mark  :  1970  DOS: 2023  MRN: [id number]  PCP: Kushal Linda    Sports Medicine Clinic Visit      HPI  Earlene Mark is a 52 year old female who is seen as a self referral presenting with right shoulder pain.    - Mechanism of Injury:  No inciting injury.   - Prior evaluation:  None    - Pain Character:  Pain has been present for  the past few months without acute injury onset .  Pain is in the anterolateral right shoulder, with radiation into the right arm .   - Endorses:  popping, clicking, radiation of pain, normal ROM per patient  - Denies:  numbness, tingling, grinding, swelling, radicular shooting pain  - Alleviating factors:   not reaching forward  - Aggravating factors:   overhead motions, catching feeling in shoulder with any reaching forward.  Sleeping  - Treatments tried: putting peptides into shoulder, ibuprofen    - Patient Goals:  get a formal diagnosis  - Social History: Takes care of disabled son      Review of Systems  Musculoskeletal: as above  Remainder of review of systems is negative including constitutional, CV, pulmonary, GI, Skin and Neurologic except as noted in HPI or medical history.    Past Medical History:   Diagnosis Date    Allergic rhinitis, cause unspecified     ? sinus infections    ANXIETY STATE NOS 2003    FILEMON 3 - cervical intraepithelial neoplasia grade 3 2009    DEPRESSIVE DISORDER NEC 2003    Menorrhagia 2009    s/p Hysterectomy, cervical pathology benign, no need for further pap smears 1/3/2013    Wheezing 3/10/2021     Past Surgical History:   Procedure Laterality Date    COLPOSCOPY,LOOP ELECTRD CERVIX EXCIS      HC CONIZATION CERVIX,KNIFE/LASER  08    FILEMON 3    HC REMOVAL OF TONSILS,12+ Y/O      HYSTERECTOMY, VAGINAL  02/02/10    laparoscopic assisted vaginal.  benign cervix    TEST NOT FOUND  2001    Abdominoplasty (tummy tuck)    [facility]  DELIVERY ONLY      , Low Cervical     Family History    Problem Relation Age of Onset    Neurologic Disorder Mother         migraines    Lipids Mother     Arthritis Mother         rheumatoid    Chronic Obstructive Pulmonary Disease Mother         smoker    Hypertension Father     Lipids Father     Blood Disease Father 51        DVT  at age 51 from probable PE    Neurologic Disorder Father         Muscular Dystrophy    Muscular Dystrophy Brother 19    Diabetes Paternal Grandmother     Diabetes Paternal Grandfather     Cancer Paternal Grandfather         colon    Neurologic Disorder Brother         Muscular Dystrophy  at age 19 from an MI    Cancer Paternal Uncle         colon    Asthma Son     Congenital Anomalies Son         epilepsy hydrocephilis         Objective  /76   Wt 65.6 kg (144 lb 9.6 oz)   LMP 2010   BMI 24.90 kg/m      General: healthy, alert and in no acute distress.    HEENT: no scleral icterus or conjunctival erythema.   Skin: no suspicious lesions or rash. No jaundice.   CV: regular rhythm by palpation, 2+ distal pulses.  Resp: normal respiratory effort without conversational dyspnea.   Psych: normal mood and affect.    Gait: nonantalgic, appropriate coordination and balance.     Neuro:        - Sensation to light touch:    - Intact throughout the BUE including all peripheral nerve distributions.        - MSR:       RUE  LUE  - Biceps  2+ 2+  - Brachioradialis 2+ 2+  - Triceps  2+ 2+       - Special tests:   - Spurling's:  Neg bilaterally    MSK - Shoulder:       - Inspection:    - No significant swelling, erythema, warmth, ecchymosis, lesion, or atrophy noted.        - ROM:    - Full AROM/PROM with pain during shoulder abduction, flexion, IR/ER.        - Palpation:    - TTP at the lateral deltoid, subacromial space.  - NTTP elsewhere.        - Strength:  (*antalgic)  RUE LUE  - Shoulder Abduction   5* 5   - Shoulder Flexion   5* 5   - Shoulder Internal Rotation  5* 5   - Shoulder External Rotation  5* 5  - Elbow Flexion   5 5  -  Elbow Extension   5 5  - Forearm Pronation   5 5  - Forearm Supination   5 5  - Wrist Extension   5 5  - Wrist Flexion    5 5  - FDI     5 5  - ADM     5 5  - FPL     5 5  - APB     5 5  - EIP     5 5  - EDC     5 5  - APL/EPB    5 5          - Special tests:        - [place]: Positive   - Neers: Positive   - Empty can: Positive    - [place]:  Neg    - Scarf:  Neg    - Speeds:  Neg    - Yergason:  Neg    - Apprehension/Relocation:  Neg     Radiology  I independently reviewed today's new relevant imaging, with the following interpretation:  - XR R shoulder 7/19/2023 shows normal anatomic alignment without acute fracture or dislocation, no significant degenerative changes.      Assessment  1. Rotator cuff impingement syndrome of right shoulder        Plan  Earlene Mark is a 52 year old female that presents with right shoulder pain.  Pain is in the anterolateral shoulder and worse with particular shoulder movements such as abduction and internal rotation.  Night pain with sleeping and direct pressure.  Has been managing conservatively up to this point, presents for next steps and available treatment options.  History and physical exam appear most consistent with rotator cuff impingement syndrome of the right shoulder.     Discussed the nature of the condition and treatment options and mutually agreed upon the following plan:    - Imaging:          - Reviewed relevant imaging in the chart.  -XR R shoulder ordered today pre-clinic and independently interpreted by myself.    - Reviewed results and images with patient.   - Medications:          - Discussed pharmacologic options for pain relief.   - May use NSAIDs (Ibuprofen, Naproxen) or Acetaminophen (Tylenol) as needed for pain control.   -  May also use topical medications such as lidocaine, IcyHot, BioFreeze, or Voltaren gel (diclofenac) as needed for pain control.  May use Voltaren gel up to 4 times per day as needed over the painful shoulder.  - Injections:           - Discussed possible injection options and alternatives.    - Options include corticosteroid injection of the subacromial/subdeltoid bursa.    - Deferred injections today and will consider them in the future as needed.   - Therapy:          - Discussed the benefits of physical therapy vs home exercise program for optimization of range of motion, flexibility, strength, stability and function.   - Preference is for a home exercise program.   - Home Exercise Program given today in clinic and recommendation given to perform HEP daily and after exacerbations.  - Modalities:          - May use ice, heat, massage or other modalities as needed.   - Activity:          - Encouraged to remain active and participate in regular activities as symptoms allow.  Avoid or modify exacerbating activities as much as is necessary.  - Follow up:          -As needed in the future for re-evaluation and update to treatment plan, or sooner for new/worsening symptoms.  - Patient has clinic contact information for questions or concerns.       Toan Serrano DO, YESSENIA  [facility] - Sports Medicine  [facility] - Department of Orthopedic Surgery       Disclaimer:  This note was prepared and written using Dragon Medical dictation software. As a result, there may be errors in the script that have gone undetected. Please consider this when interpreting the information in this note.

## 2023-07-19 ENCOUNTER — ANCILLARY PROCEDURE (OUTPATIENT)
Dept: GENERAL RADIOLOGY | Facility: CLINIC | Age: 53
End: 2023-07-19
Attending: STUDENT IN AN ORGANIZED HEALTH CARE EDUCATION/TRAINING PROGRAM

## 2023-07-19 ENCOUNTER — OFFICE VISIT (OUTPATIENT)
Dept: ORTHOPEDICS | Facility: CLINIC | Age: 53
End: 2023-07-19

## 2023-07-19 VITALS — BODY MASS INDEX: 24.9 KG/M2 | SYSTOLIC BLOOD PRESSURE: 129 MMHG | DIASTOLIC BLOOD PRESSURE: 76 MMHG | WEIGHT: 144.6 LBS

## 2023-07-19 DIAGNOSIS — M75.41 ROTATOR CUFF IMPINGEMENT SYNDROME OF RIGHT SHOULDER: Primary | ICD-10-CM

## 2023-07-19 DIAGNOSIS — M25.511 ACUTE PAIN OF RIGHT SHOULDER: ICD-10-CM

## 2023-07-19 PROCEDURE — 73030 X-RAY EXAM OF SHOULDER: CPT | Mod: TC | Performed by: RADIOLOGY

## 2023-07-19 PROCEDURE — 99204 OFFICE O/P NEW MOD 45 MIN: CPT | Performed by: STUDENT IN AN ORGANIZED HEALTH CARE EDUCATION/TRAINING PROGRAM

## 2023-07-19 ASSESSMENT — PAIN SCALES - GENERAL: PAINLEVEL: NO PAIN (0)

## 2023-07-19 NOTE — PATIENT INSTRUCTIONS
Pearl Earlene AL Mark ,     A copy of your assessment and our treatment plan that we discussed together is included below, as written in your medical chart.   If you have any questions, please feel free to call the clinic.     --------------------------------------------------  Earlene Mark is a 52 year old female that presents with right shoulder pain.  Pain is in the anterolateral shoulder and worse with particular shoulder movements such as abduction and internal rotation.  Night pain with sleeping and direct pressure.  Has been managing conservatively up to this point, presents for next steps and available treatment options.  History and physical exam appear most consistent with rotator cuff impingement syndrome of the right shoulder.     Discussed the nature of the condition and treatment options and mutually agreed upon the following plan:    - Imaging:          - Reviewed relevant imaging in the chart.  -XR R shoulder ordered today pre-clinic and independently interpreted by myself.    - Reviewed results and images with patient.   - Medications:          - Discussed pharmacologic options for pain relief.   - May use NSAIDs (Ibuprofen, Naproxen) or Acetaminophen (Tylenol) as needed for pain control.   -  May also use topical medications such as lidocaine, IcyHot, BioFreeze, or Voltaren gel (diclofenac) as needed for pain control.  May use Voltaren gel up to 4 times per day as needed over the painful shoulder.  - Injections:          - Discussed possible injection options and alternatives.    - Options include corticosteroid injection of the subacromial/subdeltoid bursa.    - Deferred injections today and will consider them in the future as needed.   - Therapy:          - Discussed the benefits of physical therapy vs home exercise program for optimization of range of motion, flexibility, strength, stability and function.   - Preference is for a home exercise program.   - Home Exercise Program given today in  clinic and recommendation given to perform HEP daily and after exacerbations.  - Modalities:          - May use ice, heat, massage or other modalities as needed.   - Activity:          - Encouraged to remain active and participate in regular activities as symptoms allow.  Avoid or modify exacerbating activities as much as is necessary.  - Follow up:          -As needed in the future for re-evaluation and update to treatment plan, or sooner for new/worsening symptoms.  - Patient has clinic contact information for questions or concerns.   --------------------------------------------------    It was a pleasure seeing you today. Thank you for choosing [facility] for your care.       Toan Serrano DO, PAOLAM  [facility] - Sports Medicine  [facility] - Department of Orthopedic Surgery     Disclaimer:  This note was prepared and written using Dragon Medical dictation software. As a result, there may be errors in the script that have gone undetected. Please consider this when interpreting the information in this note.

## 2023-07-19 NOTE — LETTER
2023         RE: Earlene Mark  [address]  [place] [address]        Dear Colleague,    Thank you for referring your patient, Earlene Mark, to the [facility] [place]. Please see a copy of my visit note below.    Earlene Mark  :  1970  DOS: 2023  MRN: [id number]  PCP: Kushal Linda    Sports Medicine Clinic Visit      HPI  Earlene Mark is a 52 year old female who is seen as a self referral presenting with right shoulder pain.    - Mechanism of Injury:  No inciting injury.   - Prior evaluation:  None    - Pain Character:  Pain has been present for  the past few months without acute injury onset .  Pain is in the anterolateral right shoulder, with radiation into the right arm .   - Endorses:  popping, clicking, radiation of pain, normal ROM per patient  - Denies:  numbness, tingling, grinding, swelling, radicular shooting pain  - Alleviating factors:   not reaching forward  - Aggravating factors:   overhead motions, catching feeling in shoulder with any reaching forward.  Sleeping  - Treatments tried: putting peptides into shoulder, ibuprofen    - Patient Goals:  get a formal diagnosis  - Social History: Takes care of disabled son      Review of Systems  Musculoskeletal: as above  Remainder of review of systems is negative including constitutional, CV, pulmonary, GI, Skin and Neurologic except as noted in HPI or medical history.    Past Medical History:   Diagnosis Date     Allergic rhinitis, cause unspecified     ? 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     DEPRESSIVE DISORDER NEC 2003     Menorrhagia 2009     s/p Hysterectomy, cervical pathology benign, no need for further pap smears 1/3/2013     Wheezing 3/10/2021     Past Surgical History:   Procedure Laterality Date     COLPOSCOPY,LOOP ELECTRD CERVIX EXCIS        CONIZATION CERVIX,KNIFE/LASER   08    FILEMON 3     HC REMOVAL OF TONSILS,12+ Y/O       HYSTERECTOMY, VAGINAL  02/02/10    laparoscopic assisted vaginal.  benign cervix     TEST NOT FOUND  2001    Abdominoplasty (tummy tuck)     [facility]  DELIVERY ONLY      , Low Cervical     Family History   Problem Relation Age of Onset     Neurologic Disorder Mother         migraines     Lipids Mother      Arthritis Mother         rheumatoid     Chronic Obstructive Pulmonary Disease Mother         smoker     Hypertension Father      Lipids Father      Blood Disease Father 51        DVT  at age 51 from probable PE     Neurologic Disorder Father         Muscular Dystrophy     Muscular Dystrophy Brother 19     Diabetes Paternal Grandmother      Diabetes Paternal Grandfather      Cancer Paternal Grandfather         colon     Neurologic Disorder Brother         Muscular Dystrophy  at age 19 from an MI     Cancer Paternal Uncle         colon     Asthma Son      Congenital Anomalies Son         epilepsy hydrocephilis         Objective  /76   Wt 65.6 kg (144 lb 9.6 oz)   LMP 2010   BMI 24.90 kg/m      General: healthy, alert and in no acute distress.    HEENT: no scleral icterus or conjunctival erythema.   Skin: no suspicious lesions or rash. No jaundice.   CV: regular rhythm by palpation, 2+ distal pulses.  Resp: normal respiratory effort without conversational dyspnea.   Psych: normal mood and affect.    Gait: nonantalgic, appropriate coordination and balance.     Neuro:        - Sensation to light touch:    - Intact throughout the BUE including all peripheral nerve distributions.        - MSR:       RUE  LUE  - Biceps  2+ 2+  - Brachioradialis 2+ 2+  - Triceps  2+ 2+       - Special tests:   - Spurling's:  Neg bilaterally    MSK - Shoulder:       - Inspection:    - No significant swelling, erythema, warmth, ecchymosis, lesion, or atrophy noted.        - ROM:    - Full AROM/PROM with pain during shoulder abduction,  flexion, IR/ER.        - Palpation:    - TTP at the lateral deltoid, subacromial space.  - NTTP elsewhere.        - Strength:  (*antalgic)  RUE LUE  - Shoulder Abduction   5* 5   - Shoulder Flexion   5* 5   - Shoulder Internal Rotation  5* 5   - Shoulder External Rotation  5* 5  - Elbow Flexion   5 5  - Elbow Extension   5 5  - Forearm Pronation   5 5  - Forearm Supination   5 5  - Wrist Extension   5 5  - Wrist Flexion    5 5  - FDI     5 5  - ADM     5 5  - FPL     5 5  - APB     5 5  - EIP     5 5  - EDC     5 5  - APL/EPB    5 5          - Special tests:        - [place]: Positive   - Neers: Positive   - Empty can: Positive    - [place]:  Neg    - Scarf:  Neg    - Speeds:  Neg    - Yergason:  Neg    - Apprehension/Relocation:  Neg     Radiology  I independently reviewed today's new relevant imaging, with the following interpretation:  - XR R shoulder 7/19/2023 shows normal anatomic alignment without acute fracture or dislocation, no significant degenerative changes.      Assessment  1. Rotator cuff impingement syndrome of right shoulder        Plan  Earlene Mark is a 52 year old female that presents with right shoulder pain.  Pain is in the anterolateral shoulder and worse with particular shoulder movements such as abduction and internal rotation.  Night pain with sleeping and direct pressure.  Has been managing conservatively up to this point, presents for next steps and available treatment options.  History and physical exam appear most consistent with rotator cuff impingement syndrome of the right shoulder.     Discussed the nature of the condition and treatment options and mutually agreed upon the following plan:    - Imaging:          - Reviewed relevant imaging in the chart.  -XR R shoulder ordered today pre-clinic and independently interpreted by myself.    - Reviewed results and images with patient.   - Medications:          - Discussed pharmacologic options for pain relief.   - May use NSAIDs  (Ibuprofen, Naproxen) or Acetaminophen (Tylenol) as needed for pain control.   -  May also use topical medications such as lidocaine, IcyHot, BioFreeze, or Voltaren gel (diclofenac) as needed for pain control.  May use Voltaren gel up to 4 times per day as needed over the painful shoulder.  - Injections:          - Discussed possible injection options and alternatives.    - Options include corticosteroid injection of the subacromial/subdeltoid bursa.    - Deferred injections today and will consider them in the future as needed.   - Therapy:          - Discussed the benefits of physical therapy vs home exercise program for optimization of range of motion, flexibility, strength, stability and function.   - Preference is for a home exercise program.   - Home Exercise Program given today in clinic and recommendation given to perform HEP daily and after exacerbations.  - Modalities:          - May use ice, heat, massage or other modalities as needed.   - Activity:          - Encouraged to remain active and participate in regular activities as symptoms allow.  Avoid or modify exacerbating activities as much as is necessary.  - Follow up:          -As needed in the future for re-evaluation and update to treatment plan, or sooner for new/worsening symptoms.  - Patient has clinic contact information for questions or concerns.       Toan Serrano DO, CAQSM  [facility] Sports Medicine  [facility] - Department of Orthopedic Surgery       Disclaimer:  This note was prepared and written using Dragon Medical dictation software. As a result, there may be errors in the script that have gone undetected. Please consider this when interpreting the information in this note.        Again, thank you for allowing me to participate in the care of your patient.        Sincerely,        Toan Serrano DO

## 2023-07-24 ENCOUNTER — MYC REFILL (OUTPATIENT)
Dept: FAMILY MEDICINE | Facility: CLINIC | Age: 53
End: 2023-07-24

## 2023-07-24 DIAGNOSIS — G47.09 OTHER INSOMNIA: ICD-10-CM

## 2023-07-24 NOTE — TELEPHONE ENCOUNTER
"Pending Prescriptions:                       Disp   Refills    traZODone (DESYREL) 50 MG tablet           180 ta*0          Routing refill request to provider for review/approval because:  Patient needs to be seen because it has been more than 1 year since last office visit.    PHQ-9 score:         No data to display                  Requested Prescriptions   Pending Prescriptions Disp Refills    traZODone (DESYREL) 50 MG tablet 180 tablet 0       Serotonin Modulators Passed - 7/24/2023  1:10 PM        Passed - Recent (12 mo) or future (30 days) visit within the authorizing provider's specialty     Patient has had an office visit with the authorizing provider or a provider within the authorizing providers department within the previous 12 mos or has a future within next 30 days. See \"Patient Info\" tab in inbasket, or \"Choose Columns\" in Meds & Orders section of the refill encounter.              Passed - Medication is active on med list        Passed - Patient is age 18 or older        Passed - No active pregnancy on record        Passed - No positive pregnancy test in past 12 months                   "

## 2023-07-25 RX ORDER — TRAZODONE HYDROCHLORIDE 50 MG/1
50 TABLET, FILM COATED ORAL AT BEDTIME
Qty: 30 TABLET | Refills: 0 | Status: SHIPPED | OUTPATIENT
Start: 2023-07-25

## 2023-07-25 NOTE — TELEPHONE ENCOUNTER
Has not been seen for over a year, must follow-up before med run out.  1 month supply refilled.  Recommend a physical with general follow-up, 40 minutes with Dr. Linda.  Thank you

## 2023-07-26 ENCOUNTER — MYC MEDICAL ADVICE (OUTPATIENT)
Dept: FAMILY MEDICINE | Facility: CLINIC | Age: 53
End: 2023-07-26

## 2023-07-26 NOTE — TELEPHONE ENCOUNTER
Sent [employer] to schedule appt with 7/31 reminder to send letter if not read.    Fabby Matamroos, CMA

## 2023-07-27 ENCOUNTER — MYC MEDICAL ADVICE (OUTPATIENT)
Dept: FAMILY MEDICINE | Facility: CLINIC | Age: 53
End: 2023-07-27

## 2023-07-27 DIAGNOSIS — G47.09 OTHER INSOMNIA: Primary | ICD-10-CM

## 2023-07-27 NOTE — TELEPHONE ENCOUNTER
Ok to wait for Dr. Linda's return.    Last refill from 4/21/23 states 2 tablets daily- patient would like 100mg tablets.    Rose Mary Deglado RN on 7/27/2023 at 4:20 PM

## 2023-08-01 RX ORDER — TRAZODONE HYDROCHLORIDE 100 MG/1
100 TABLET ORAL AT BEDTIME
Qty: 90 TABLET | Refills: 3 | Status: SHIPPED | OUTPATIENT
Start: 2023-08-01 | End: 2023-08-07

## 2023-08-07 DIAGNOSIS — S83.241A ACUTE MEDIAL MENISCUS TEAR OF RIGHT KNEE, INITIAL ENCOUNTER: ICD-10-CM

## 2023-08-07 DIAGNOSIS — M94.261 CHONDROMALACIA OF RIGHT KNEE: ICD-10-CM

## 2023-08-07 DIAGNOSIS — M22.2X1 PATELLOFEMORAL SYNDROME OF RIGHT KNEE: ICD-10-CM

## 2023-08-07 RX ORDER — TRAZODONE HYDROCHLORIDE 100 MG/1
100 TABLET ORAL AT BEDTIME
Qty: 90 TABLET | Refills: 3 | Status: SHIPPED | OUTPATIENT
Start: 2023-08-07 | End: 2024-05-31

## 2023-08-07 RX ORDER — MELOXICAM 15 MG/1
15 TABLET ORAL DAILY
Qty: 90 TABLET | Refills: 0 | Status: SHIPPED | OUTPATIENT
Start: 2023-08-07

## 2023-08-07 NOTE — TELEPHONE ENCOUNTER
REFILL REQUEST     Last Written Prescription Date:  1/11/23  Last Fill Quantity: 90,  # refills: 0   Last office visit with prescribing provider: 5/18/2022    Future Office Visit: N/A     Susanne Fitzgerald RN   [facility]

## 2023-08-07 NOTE — TELEPHONE ENCOUNTER
Medication is being filled for 1 time refill only due to:  patient is due for yearly visit with BEN Herrera.     Please call and assist patient in scheduling.     Susanne Fitzgerald RN   [facility]

## 2023-09-27 ENCOUNTER — HOSPITAL ENCOUNTER (OUTPATIENT)
Dept: MAMMOGRAPHY | Facility: CLINIC | Age: 53
Discharge: HOME | End: 2023-09-27
Attending: FAMILY MEDICINE | Admitting: FAMILY MEDICINE
Payer: COMMERCIAL

## 2023-09-27 ENCOUNTER — OFFICE VISIT (OUTPATIENT)
Dept: INTERNAL MEDICINE | Facility: CLINIC | Age: 53
End: 2023-09-27
Payer: COMMERCIAL

## 2023-09-27 VITALS
WEIGHT: 134.7 LBS | OXYGEN SATURATION: 99 % | RESPIRATION RATE: 12 BRPM | SYSTOLIC BLOOD PRESSURE: 128 MMHG | DIASTOLIC BLOOD PRESSURE: 60 MMHG | BODY MASS INDEX: 22.44 KG/M2 | HEART RATE: 62 BPM | TEMPERATURE: 97.8 F | HEIGHT: 65 IN

## 2023-09-27 DIAGNOSIS — Z12.31 VISIT FOR SCREENING MAMMOGRAM: ICD-10-CM

## 2023-09-27 DIAGNOSIS — Z00.00 NORMAL BREAST EXAM: Primary | ICD-10-CM

## 2023-09-27 PROCEDURE — 99212 OFFICE O/P EST SF 10 MIN: CPT | Performed by: INTERNAL MEDICINE

## 2023-09-27 PROCEDURE — 77067 SCR MAMMO BI INCL CAD: CPT

## 2023-09-27 ASSESSMENT — ENCOUNTER SYMPTOMS
COUGH: 0
PALPITATIONS: 0
HEMATOCHEZIA: 0
FREQUENCY: 0
CHILLS: 0
BREAST MASS: 0
DIZZINESS: 0
SHORTNESS OF BREATH: 0
HEMATURIA: 0
NERVOUS/ANXIOUS: 0
DIARRHEA: 0
ABDOMINAL PAIN: 0
EYE PAIN: 0
ARTHRALGIAS: 0
WEAKNESS: 0
PARESTHESIAS: 0
JOINT SWELLING: 0
NAUSEA: 0
CONSTIPATION: 0
HEARTBURN: 0
FEVER: 0
SORE THROAT: 0
DYSURIA: 0
HEADACHES: 0
MYALGIAS: 0

## 2023-09-27 NOTE — PROGRESS NOTES
"   SUBJECTIVE:      EMR reviewed including:             Complaint, History of Chief Complaint, Corresponding Review of Systems, and Complaint Specific Physical Examination.    #1   Patient presents today for a BENJAMIN exam.  Wants nothing more than a bimanual bilateral breast examination prior to her free mammogram.  Bimanual bilateral breast examination performed.  No lumps or irregularities noted.  No skin dimpling or peau d'orange noted.  No evidence of discharge or adenopathy noted.  Breast tissue primarily adipose.          Patient has been interviewed, applicable history and applied review of systems have been performed.    Vital Signs:   /60   Pulse 62   Temp 97.8  F (36.6  C)   Resp 12   Ht 1.65 m (5' 4.96\")   Wt 61.1 kg (134 lb 11.2 oz)   LMP 02/01/2010   SpO2 99%   BMI 22.44 kg/m        Decision Making    Problem and Complexity     1. Normal breast exam  Mammogram pending momentarily                                FOLLOW UP   I have asked the patient to make an appointment for followup with either:  2.  The patient's preferred provider,  3.  Any available provider  As scheduled for regular health care.        Regarding routine vaccinations:  I have reviewed the patient's vaccination schedule and discussed the benefits of prophylactic vaccination in detail.  I recommend the patient contact their pharmacist for vaccinations.  Discussed that most insurance companies now favor reimbursement to the pharmacies and it will financially behoove the patient to have vaccinations performed at their pharmacy.        I have carefully explained the diagnosis and treatment options to the patient.  The patient has displayed an understanding of the above, and all subsequent questions were answered.      DO FARHAT Archuleta    Portions of this note were produced using SeeWhy  Although every attempt at real-time proof reading has been made, occasional grammar/syntax errors may have been " missed.

## 2023-11-21 DIAGNOSIS — R53.83 FATIGUE: ICD-10-CM

## 2023-11-21 DIAGNOSIS — N95.1 SYMPTOMATIC MENOPAUSAL OR FEMALE CLIMACTERIC STATES: Primary | ICD-10-CM

## 2023-11-21 DIAGNOSIS — R23.2 FLUSHING: ICD-10-CM

## 2023-11-21 DIAGNOSIS — R68.82 DECREASED LIBIDO: ICD-10-CM

## 2023-11-25 ENCOUNTER — MYC MEDICAL ADVICE (OUTPATIENT)
Dept: ORTHOPEDICS | Facility: CLINIC | Age: 53
End: 2023-11-25
Payer: COMMERCIAL

## 2023-11-25 DIAGNOSIS — M25.511 CHRONIC RIGHT SHOULDER PAIN: Primary | ICD-10-CM

## 2023-11-25 DIAGNOSIS — G89.29 CHRONIC RIGHT SHOULDER PAIN: Primary | ICD-10-CM

## 2023-11-28 NOTE — TELEPHONE ENCOUNTER
Patient mycharted in after being seen on 7/19/2023 for right shoulder pain. At this time, conservative measures of PT exercises, ice, voltaren and rest have been unsuccessful. Patient is hopeful for a right shoulder MRI at this time. Please advise. Piper Singh, ATC     Bexarotene Counseling:  I discussed with the patient the risks of bexarotene including but not limited to hair loss, dry lips/skin/eyes, liver abnormalities, hyperlipidemia, pancreatitis, depression/suicidal ideation, photosensitivity, drug rash/allergic reactions, hypothyroidism, anemia, leukopenia, infection, cataracts, and teratogenicity.  Patient understands that they will need regular blood tests to check lipid profile, liver function tests, white blood cell count, thyroid function tests and pregnancy test if applicable.

## 2023-11-28 NOTE — CONFIDENTIAL NOTE
Patient having chronic right shoulder pain, neg x-ray. Plan to order right shoulder MRI and have follow-up with Dr. Serrano to go over the results.    Danny Rose MD

## 2023-12-08 ENCOUNTER — HOSPITAL ENCOUNTER (OUTPATIENT)
Dept: MRI IMAGING | Facility: CLINIC | Age: 53
Discharge: HOME | End: 2023-12-08
Attending: FAMILY MEDICINE | Admitting: FAMILY MEDICINE

## 2023-12-08 DIAGNOSIS — G89.29 CHRONIC RIGHT SHOULDER PAIN: ICD-10-CM

## 2023-12-08 DIAGNOSIS — M25.511 CHRONIC RIGHT SHOULDER PAIN: ICD-10-CM

## 2023-12-08 PROCEDURE — 73221 MRI JOINT UPR EXTREM W/O DYE: CPT | Mod: RT

## 2023-12-08 PROCEDURE — 73221 MRI JOINT UPR EXTREM W/O DYE: CPT | Mod: 26 | Performed by: RADIOLOGY

## 2023-12-12 ENCOUNTER — OFFICE VISIT (OUTPATIENT)
Dept: ORTHOPEDICS | Facility: OTHER | Age: 53
End: 2023-12-12

## 2023-12-12 VITALS — SYSTOLIC BLOOD PRESSURE: 106 MMHG | DIASTOLIC BLOOD PRESSURE: 62 MMHG

## 2023-12-12 DIAGNOSIS — M75.41 ROTATOR CUFF IMPINGEMENT SYNDROME OF RIGHT SHOULDER: Primary | ICD-10-CM

## 2023-12-12 DIAGNOSIS — M75.01 ADHESIVE CAPSULITIS OF RIGHT SHOULDER: ICD-10-CM

## 2023-12-12 DIAGNOSIS — S43.431A TEAR OF RIGHT GLENOID LABRUM, INITIAL ENCOUNTER: ICD-10-CM

## 2023-12-12 PROCEDURE — 99214 OFFICE O/P EST MOD 30 MIN: CPT | Performed by: STUDENT IN AN ORGANIZED HEALTH CARE EDUCATION/TRAINING PROGRAM

## 2023-12-12 ASSESSMENT — PAIN SCALES - GENERAL: PAINLEVEL: MILD PAIN (3)

## 2023-12-12 NOTE — PROGRESS NOTES
Earlene Mark  :  1970  DOS: 2023  MRN: [id number]  PCP: Kushal Linda    Sports Medicine Clinic Visit    Interim History - 2023  - Last seen in clinic on 23 for right rotator cuff impingement. No injections performed.    - Since the last visit, she has been resting and performing HEP, icing, Voltaren.  Not seeing significant improvement.  - She feels that the weakness is getting worse and has been noticing decreased activation of her right biceps and atrophy that was evident to her and her .  -Requested an MRI of the right shoulder, which was approved by a colleague in my absence.  Presents today to go over results and updated treatment plan.  - She notes pain in external rotation and above head motions.   -She also has a significant family history of a brother, her father, and multiple paternal family members with muscular dystrophy.  She had previously done genetic testing and found out she was not a carrier prior to having children.  Still wondering if some of her weakness may be due to MD.      -MRI R shoulder 2023 shows:  - Tendinopathy of multiple RTC tendons including the supraspinatus, infraspinatus and subscapularis without tear.  No atrophy of rotator cuff musculature.  - Posterior inferior to posterior superior labral tearing with associated chondromalacia.  -Extra-articular tenosynovitis of the long head of the biceps tendon without tear.  -Some evidence of potential adhesive capsulitis.      HPI  Earlene Mark is a 52 year old female who is seen as a self referral presenting with right shoulder pain.    - Mechanism of Injury:  No inciting injury.   - Prior evaluation:  None    - Pain Character:  Pain has been present for  the past few months without acute injury onset .  Pain is in the anterolateral right shoulder, with radiation into the right arm .   - Endorses:  popping, clicking, radiation of pain, normal ROM per patient  - Denies:  numbness,  tingling, grinding, swelling, radicular shooting pain  - Alleviating factors:   not reaching forward  - Aggravating factors:   overhead motions, catching feeling in shoulder with any reaching forward.  Sleeping  - Treatments tried: putting peptides into shoulder, ibuprofen    - Patient Goals:  get a formal diagnosis  - Social History: Takes care of disabled son      Review of Systems  Musculoskeletal: as above  Remainder of review of systems is negative including constitutional, CV, pulmonary, GI, Skin and Neurologic except as noted in HPI or medical history.    Past Medical History:   Diagnosis Date    Allergic rhinitis, cause unspecified     ? sinus infections    ANXIETY STATE NOS 2003    FILEMON 3 - cervical intraepithelial neoplasia grade 3 2009    DEPRESSIVE DISORDER NEC 2003    Menorrhagia 2009    s/p Hysterectomy, cervical pathology benign, no need for further pap smears 1/3/2013    Wheezing 3/10/2021     Past Surgical History:   Procedure Laterality Date    COLPOSCOPY,LOOP ELECTRD CERVIX EXCIS      HC CONIZATION CERVIX,KNIFE/LASER  08    FILEMON 3    HC REMOVAL OF TONSILS,12+ Y/O      HYSTERECTOMY, VAGINAL  02/02/10    laparoscopic assisted vaginal.  benign cervix    TEST NOT FOUND  2001    Abdominoplasty (tummy tuck)    [facility]  DELIVERY ONLY      , Low Cervical     Family History   Problem Relation Age of Onset    Neurologic Disorder Mother         migraines    Lipids Mother     Arthritis Mother         rheumatoid    Chronic Obstructive Pulmonary Disease Mother         smoker    Hypertension Father     Lipids Father     Blood Disease Father 51        DVT  at age 51 from probable PE    Neurologic Disorder Father         Muscular Dystrophy    Muscular Dystrophy Brother 19    Diabetes Paternal Grandmother     Diabetes Paternal Grandfather     Cancer Paternal Grandfather         colon    Neurologic Disorder Brother         Muscular Dystrophy  at age 19 from  an MI    Cancer Paternal Uncle         colon    Asthma Son     Congenital Anomalies Son         epilepsy hydrocephilis         Objective  /62   LMP 02/01/2010     General: healthy, alert and in no acute distress.    HEENT: no scleral icterus or conjunctival erythema.   Skin: no suspicious lesions or rash. No jaundice.   CV: regular rhythm by palpation, 2+ distal pulses.  Resp: normal respiratory effort without conversational dyspnea.   Psych: normal mood and affect.    Gait: nonantalgic, appropriate coordination and balance.     Neuro:        - Sensation to light touch:    - Intact throughout the BUE including all peripheral nerve distributions.        - MSR:       RUE  LUE  - Biceps  2+ 2+  - Brachioradialis 2+ 2+  - Triceps  2+ 2+       - Special tests:   - Spurling's:  Neg bilaterally    MSK - Shoulder:       - Inspection:    - No significant swelling, erythema, warmth, ecchymosis, lesion, or atrophy noted.        - ROM:    - Decreased AROM/PROM in SER and shoulder abduction, with pain during shoulder abduction, flexion, IR/ER.        - Palpation:    - TTP at the lateral deltoid, subacromial space.  - NTTP elsewhere.        - Strength:  (*antalgic)  RUE LUE  - Shoulder Abduction   5* 5   - Shoulder Flexion   5* 5   - Shoulder Internal Rotation  5* 5   - Shoulder External Rotation  5* 5  - Elbow Flexion   5 5  - Elbow Extension   5 5  - Forearm Pronation   5 5  - Forearm Supination   5 5  - Wrist Extension   5 5  - Wrist Flexion    5 5  - FDI     5 5  - ADM     5 5  - FPL     5 5  - APB     5 5  - EIP     5 5  - EDC     5 5  - APL/EPB    5 5          - Special tests:        - Carson: Positive   - Neers: Positive   - Empty can: Positive    - McCaulley:  Pos   - Scarf:  Neg    - Speeds:  Neg    - Yergason:  Neg    - Apprehension/Relocation:  Neg     Radiology  I independently reviewed today's new relevant imaging, with the following interpretation:  - XR R shoulder 7/19/2023 shows normal anatomic alignment  without acute fracture or dislocation, no significant degenerative changes.  -MRI R shoulder with interpretation as above in HPI      Assessment  1. Rotator cuff impingement syndrome of right shoulder    2. Tear of right glenoid labrum, initial encounter    3. Adhesive capsulitis of right shoulder        Plan  Earlene Mark is a 52 year old female that presents with right shoulder pain.  Pain is in the anterolateral shoulder and worse with particular shoulder movements such as abduction and internal rotation.  Night pain with sleeping and direct pressure.  Decreasing range of motion over time, especially with ER and Sab.  An MRI was obtained that showed tendinopathy of multiple RTC tendons, biceps tenosynovitis, some labral degeneration, and evidence of adhesive capsulitis.  This fits well with her clinical picture and physical exam.      In regards to her concerns about her family history of muscular dystrophy, I believe that this diagnosis is very unlikely due to her current age, sex, previously negative genetic testing, as well as her presenting and ongoing symptoms and exam.  I would not recommend further MD testing at this time.  She was reassured after our discussion.    Discussed the nature of the condition and treatment options and mutually agreed upon the following plan:    - Imaging:          - Reviewed relevant imaging in the chart, including recent MRI.  - Reviewed results and images with patient.   - Medications:          - Discussed pharmacologic options for pain relief.   - May use NSAIDs (Ibuprofen, Naproxen) or Acetaminophen (Tylenol) as needed for pain control.   -  May also use topical medications such as lidocaine, IcyHot, BioFreeze, or Voltaren gel (diclofenac) as needed for pain control.  May use Voltaren gel up to 4 times per day as needed over the painful shoulder.  - Injections:          - Discussed possible injection options and alternatives.    - Options include corticosteroid injection  of the subacromial/subdeltoid bursa, glenohumeral joint, biceps tendon sheath.  She is not in favor of injectable steroids and would like to avoid them unless absolutely necessary.  - Therapy:          - Discussed the benefits of physical therapy vs home exercise program for optimization of range of motion, flexibility, strength, stability and function.  Offered a referral for formal physical therapy, preference for HEP.  Recommend continuing with her current RTC conditioning program and specifically focus on external rotation activities and sleeper stretch.  - Modalities:          - May use ice, heat, massage or other modalities as needed.   - Activity:          - Encouraged to remain active and participate in regular activities as symptoms allow.  Avoid or modify exacerbating activities as much as is necessary.  - Follow up:          -As needed in the future for re-evaluation and update to treatment plan, or sooner for new/worsening symptoms.  - Patient has clinic contact information for questions or concerns.       Toan Serrano DO, YESSENIA  [facility] - Sports Medicine  [facility] - Department of Orthopedic Surgery       Disclaimer:  This note was prepared and written using Dragon Medical dictation software. As a result, there may be errors in the script that have gone undetected. Please consider this when interpreting the information in this note.

## 2023-12-12 NOTE — LETTER
2023         RE: Earlene Mark  [address]  [place] [address]        Dear Colleague,    Thank you for referring your patient, Earlene Mark, to the [facility] [place]. Please see a copy of my visit note below.    Earlene Mark  :  1970  DOS: 2023  MRN: [id number]  PCP: Kushal Linda    Sports Medicine Clinic Visit    Interim History - 2023  - Last seen in clinic on 23 for right rotator cuff impingement. No injections performed.    - Since the last visit, she has been resting and performing HEP, icing, Voltaren.  Not seeing significant improvement.  - She feels that the weakness is getting worse and has been noticing decreased activation of her right biceps and atrophy that was evident to her and her .  -Requested an MRI of the right shoulder, which was approved by a colleague in my absence.  Presents today to go over results and updated treatment plan.  - She notes pain in external rotation and above head motions.   -She also has a significant family history of a brother, her father, and multiple paternal family members with muscular dystrophy.  She had previously done genetic testing and found out she was not a carrier prior to having children.  Still wondering if some of her weakness may be due to MD.      -MRI R shoulder 2023 shows:  - Tendinopathy of multiple RTC tendons including the supraspinatus, infraspinatus and subscapularis without tear.  No atrophy of rotator cuff musculature.  - Posterior inferior to posterior superior labral tearing with associated chondromalacia.  -Extra-articular tenosynovitis of the long head of the biceps tendon without tear.  -Some evidence of potential adhesive capsulitis.      HPI  Earlene Mark is a 52 year old female who is seen as a self referral presenting with right shoulder pain.    - Mechanism of Injury:  No inciting injury.   - Prior evaluation:  None    - Pain  Character:  Pain has been present for  the past few months without acute injury onset .  Pain is in the anterolateral right shoulder, with radiation into the right arm .   - Endorses:  popping, clicking, radiation of pain, normal ROM per patient  - Denies:  numbness, tingling, grinding, swelling, radicular shooting pain  - Alleviating factors:   not reaching forward  - Aggravating factors:   overhead motions, catching feeling in shoulder with any reaching forward.  Sleeping  - Treatments tried: putting peptides into shoulder, ibuprofen    - Patient Goals:  get a formal diagnosis  - Social History: Takes care of disabled son      Review of Systems  Musculoskeletal: as above  Remainder of review of systems is negative including constitutional, CV, pulmonary, GI, Skin and Neurologic except as noted in HPI or medical history.    Past Medical History:   Diagnosis Date     Allergic rhinitis, cause unspecified     ? 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     DEPRESSIVE DISORDER NEC 2003     Menorrhagia 2009     s/p Hysterectomy, cervical pathology benign, no need for further pap smears 1/3/2013     Wheezing 3/10/2021     Past Surgical History:   Procedure Laterality Date     COLPOSCOPY,LOOP ELECTRD CERVIX EXCIS       HC CONIZATION CERVIX,KNIFE/LASER  08    FILEMON 3     HC REMOVAL OF TONSILS,12+ Y/O       HYSTERECTOMY, VAGINAL  02/02/10    laparoscopic assisted vaginal.  benign cervix     TEST NOT FOUND  2001    Abdominoplasty (tummy tuck)     [facility]  DELIVERY ONLY      , Low Cervical     Family History   Problem Relation Age of Onset     Neurologic Disorder Mother         migraines     Lipids Mother      Arthritis Mother         rheumatoid     Chronic Obstructive Pulmonary Disease Mother         smoker     Hypertension Father      Lipids Father      Blood Disease Father 51        DVT  at age 51 from  probable PE     Neurologic Disorder Father         Muscular Dystrophy     Muscular Dystrophy Brother 19     Diabetes Paternal Grandmother      Diabetes Paternal Grandfather      Cancer Paternal Grandfather         colon     Neurologic Disorder Brother         Muscular Dystrophy  at age 19 from an MI     Cancer Paternal Uncle         colon     Asthma Son      Congenital Anomalies Son         epilepsy hydrocephilis         Objective  /62   LMP 2010     General: healthy, alert and in no acute distress.    HEENT: no scleral icterus or conjunctival erythema.   Skin: no suspicious lesions or rash. No jaundice.   CV: regular rhythm by palpation, 2+ distal pulses.  Resp: normal respiratory effort without conversational dyspnea.   Psych: normal mood and affect.    Gait: nonantalgic, appropriate coordination and balance.     Neuro:        - Sensation to light touch:    - Intact throughout the BUE including all peripheral nerve distributions.        - MSR:       RUE  LUE  - Biceps  2+ 2+  - Brachioradialis 2+ 2+  - Triceps  2+ 2+       - Special tests:   - Spurling's:  Neg bilaterally    MSK - Shoulder:       - Inspection:    - No significant swelling, erythema, warmth, ecchymosis, lesion, or atrophy noted.        - ROM:    - Decreased AROM/PROM in SER and shoulder abduction, with pain during shoulder abduction, flexion, IR/ER.        - Palpation:    - TTP at the lateral deltoid, subacromial space.  - NTTP elsewhere.        - Strength:  (*antalgic)  RUE LUE  - Shoulder Abduction   5* 5   - Shoulder Flexion   5* 5   - Shoulder Internal Rotation  5* 5   - Shoulder External Rotation  5* 5  - Elbow Flexion   5 5  - Elbow Extension   5 5  - Forearm Pronation   5 5  - Forearm Supination   5 5  - Wrist Extension   5 5  - Wrist Flexion    5 5  - FDI     5 5  - ADM     5 5  - FPL     5 5  - APB     5 5  - EIP     5 5  - EDC     5 5  - APL/EPB    5 5          - Special tests:        - Carson: Positive   - Neers:  Positive   - Empty can: Positive    - Hemphill:  Pos   - Scarf:  Neg    - Speeds:  Neg    - Yergason:  Neg    - Apprehension/Relocation:  Neg     Radiology  I independently reviewed today's new relevant imaging, with the following interpretation:  - XR R shoulder 7/19/2023 shows normal anatomic alignment without acute fracture or dislocation, no significant degenerative changes.  -MRI R shoulder with interpretation as above in HPI      Assessment  1. Rotator cuff impingement syndrome of right shoulder    2. Tear of right glenoid labrum, initial encounter    3. Adhesive capsulitis of right shoulder        Plan  Earlene Mark is a 52 year old female that presents with right shoulder pain.  Pain is in the anterolateral shoulder and worse with particular shoulder movements such as abduction and internal rotation.  Night pain with sleeping and direct pressure.  Decreasing range of motion over time, especially with ER and Sab.  An MRI was obtained that showed tendinopathy of multiple RTC tendons, biceps tenosynovitis, some labral degeneration, and evidence of adhesive capsulitis.  This fits well with her clinical picture and physical exam.      In regards to her concerns about her family history of muscular dystrophy, I believe that this diagnosis is very unlikely due to her current age, sex, previously negative genetic testing, as well as her presenting and ongoing symptoms and exam.  I would not recommend further MD testing at this time.  She was reassured after our discussion.    Discussed the nature of the condition and treatment options and mutually agreed upon the following plan:    - Imaging:          - Reviewed relevant imaging in the chart, including recent MRI.  - Reviewed results and images with patient.   - Medications:          - Discussed pharmacologic options for pain relief.   - May use NSAIDs (Ibuprofen, Naproxen) or Acetaminophen (Tylenol) as needed for pain control.   -  May also use topical medications  such as lidocaine, IcyHot, BioFreeze, or Voltaren gel (diclofenac) as needed for pain control.  May use Voltaren gel up to 4 times per day as needed over the painful shoulder.  - Injections:          - Discussed possible injection options and alternatives.    - Options include corticosteroid injection of the subacromial/subdeltoid bursa, glenohumeral joint, biceps tendon sheath.  She is not in favor of injectable steroids and would like to avoid them unless absolutely necessary.  - Therapy:          - Discussed the benefits of physical therapy vs home exercise program for optimization of range of motion, flexibility, strength, stability and function.  Offered a referral for formal physical therapy, preference for HEP.  Recommend continuing with her current RTC conditioning program and specifically focus on external rotation activities and sleeper stretch.  - Modalities:          - May use ice, heat, massage or other modalities as needed.   - Activity:          - Encouraged to remain active and participate in regular activities as symptoms allow.  Avoid or modify exacerbating activities as much as is necessary.  - Follow up:          -As needed in the future for re-evaluation and update to treatment plan, or sooner for new/worsening symptoms.  - Patient has clinic contact information for questions or concerns.       Toan Serrano DO CAQSM  [facility] - Sports Medicine  [facility] - Department of Orthopedic Surgery       Disclaimer:  This note was prepared and written using Dragon Medical dictation software. As a result, there may be errors in the script that have gone undetected. Please consider this when interpreting the information in this note.        Again, thank you for allowing me to participate in the care of your patient.        Sincerely,        Toan Serrano DO

## 2023-12-18 ENCOUNTER — LAB (OUTPATIENT)
Dept: LAB | Facility: CLINIC | Age: 53
End: 2023-12-18
Attending: REGISTERED NURSE

## 2023-12-18 DIAGNOSIS — R53.83 FATIGUE: ICD-10-CM

## 2023-12-18 DIAGNOSIS — R23.2 FLUSHING: ICD-10-CM

## 2023-12-18 DIAGNOSIS — N95.1 SYMPTOMATIC MENOPAUSAL OR FEMALE CLIMACTERIC STATES: ICD-10-CM

## 2023-12-18 DIAGNOSIS — R68.82 DECREASED LIBIDO: ICD-10-CM

## 2023-12-18 LAB
ESTRADIOL SERPL-MCNC: 30 PG/ML
PROGEST SERPL-MCNC: 3.5 NG/ML

## 2023-12-18 PROCEDURE — 84403 ASSAY OF TOTAL TESTOSTERONE: CPT

## 2023-12-18 PROCEDURE — 84144 ASSAY OF PROGESTERONE: CPT

## 2023-12-18 PROCEDURE — 36415 COLL VENOUS BLD VENIPUNCTURE: CPT

## 2023-12-18 PROCEDURE — 82670 ASSAY OF TOTAL ESTRADIOL: CPT

## 2023-12-21 LAB — TESTOST SERPL-MCNC: 75 NG/DL (ref 8–60)

## 2024-01-30 DIAGNOSIS — G43.009 MIGRAINE WITHOUT AURA AND WITHOUT STATUS MIGRAINOSUS, NOT INTRACTABLE: ICD-10-CM

## 2024-01-30 RX ORDER — RIZATRIPTAN BENZOATE 10 MG/1
TABLET ORAL
Qty: 54 TABLET | Refills: 3 | Status: SHIPPED | OUTPATIENT
Start: 2024-01-30 | End: 2024-08-28

## 2024-01-30 NOTE — TELEPHONE ENCOUNTER
Due for her yearly exam. Please schedule.     Electronically signed by:  Kushal Linda M.D.  1/30/2024

## 2024-01-31 NOTE — TELEPHONE ENCOUNTER
Patient has been notified of the note below via [facility]. Reminder set for 3 days to send letter if not read.

## 2024-02-12 ENCOUNTER — ANCILLARY PROCEDURE (OUTPATIENT)
Dept: CT IMAGING | Facility: CLINIC | Age: 54
End: 2024-02-12
Attending: FAMILY MEDICINE

## 2024-02-12 DIAGNOSIS — Z86.79 HX OF CORONARY ARTERY DISEASE: ICD-10-CM

## 2024-02-12 PROCEDURE — 75571 CT HRT W/O DYE W/CA TEST: CPT | Mod: GC | Performed by: STUDENT IN AN ORGANIZED HEALTH CARE EDUCATION/TRAINING PROGRAM

## 2024-03-31 ENCOUNTER — MYC MEDICAL ADVICE (OUTPATIENT)
Dept: FAMILY MEDICINE | Facility: CLINIC | Age: 54
End: 2024-03-31

## 2024-04-03 ENCOUNTER — TRANSFERRED RECORDS (OUTPATIENT)
Dept: HEALTH INFORMATION MANAGEMENT | Facility: CLINIC | Age: 54
End: 2024-04-03

## 2024-05-25 ENCOUNTER — HEALTH MAINTENANCE LETTER (OUTPATIENT)
Age: 54
End: 2024-05-25

## 2024-05-29 DIAGNOSIS — R23.2 HOT FLASHES: Primary | ICD-10-CM

## 2024-05-29 DIAGNOSIS — N95.1 SYMPTOMATIC MENOPAUSAL OR FEMALE CLIMACTERIC STATES: ICD-10-CM

## 2024-05-29 DIAGNOSIS — R53.83 FATIGUE: ICD-10-CM

## 2024-05-30 ENCOUNTER — MYC REFILL (OUTPATIENT)
Dept: FAMILY MEDICINE | Facility: CLINIC | Age: 54
End: 2024-05-30

## 2024-05-30 DIAGNOSIS — G47.09 OTHER INSOMNIA: ICD-10-CM

## 2024-05-30 RX ORDER — TRAZODONE HYDROCHLORIDE 100 MG/1
100 TABLET ORAL AT BEDTIME
Qty: 90 TABLET | Refills: 3 | OUTPATIENT
Start: 2024-05-30

## 2024-05-31 DIAGNOSIS — G47.09 OTHER INSOMNIA: ICD-10-CM

## 2024-05-31 RX ORDER — TRAZODONE HYDROCHLORIDE 100 MG/1
100 TABLET ORAL AT BEDTIME
Qty: 90 TABLET | Refills: 3 | Status: SHIPPED | OUTPATIENT
Start: 2024-05-31

## 2024-06-20 ENCOUNTER — LAB (OUTPATIENT)
Dept: LAB | Facility: CLINIC | Age: 54
End: 2024-06-20

## 2024-06-20 DIAGNOSIS — R53.83 FATIGUE: ICD-10-CM

## 2024-06-20 DIAGNOSIS — R23.2 HOT FLASHES: ICD-10-CM

## 2024-06-20 DIAGNOSIS — N95.1 SYMPTOMATIC MENOPAUSAL OR FEMALE CLIMACTERIC STATES: ICD-10-CM

## 2024-06-20 PROCEDURE — 84144 ASSAY OF PROGESTERONE: CPT

## 2024-06-20 PROCEDURE — 36415 COLL VENOUS BLD VENIPUNCTURE: CPT

## 2024-06-20 PROCEDURE — 82670 ASSAY OF TOTAL ESTRADIOL: CPT

## 2024-06-20 PROCEDURE — 84403 ASSAY OF TOTAL TESTOSTERONE: CPT

## 2024-06-21 LAB
ESTRADIOL SERPL-MCNC: 46 PG/ML
PROGEST SERPL-MCNC: 2 NG/ML

## 2024-06-25 LAB — TESTOST SERPL-MCNC: 406 NG/DL (ref 8–60)

## 2024-08-28 DIAGNOSIS — G43.009 MIGRAINE WITHOUT AURA AND WITHOUT STATUS MIGRAINOSUS, NOT INTRACTABLE: ICD-10-CM

## 2024-08-28 NOTE — TELEPHONE ENCOUNTER
Requested Prescriptions   Pending Prescriptions Disp Refills    rizatriptan (MAXALT) 10 MG tablet 54 tablet 3     Sig: Take 1/2 to 1 tablet by mouth at onset of migraine, may repeat in 2 hours -Max 3 tablet(s) in 24 hours       There is no refill protocol information for this order            Gricelda Yadav MA

## 2024-08-29 RX ORDER — RIZATRIPTAN BENZOATE 10 MG/1
TABLET ORAL
Qty: 54 TABLET | Refills: 3 | Status: SHIPPED | OUTPATIENT
Start: 2024-08-29

## 2024-09-04 ENCOUNTER — TRANSFERRED RECORDS (OUTPATIENT)
Dept: HEALTH INFORMATION MANAGEMENT | Facility: CLINIC | Age: 54
End: 2024-09-04

## 2025-01-23 ENCOUNTER — APPOINTMENT (OUTPATIENT)
Dept: URBAN - METROPOLITAN AREA CLINIC 259 | Age: 55
Setting detail: DERMATOLOGY
End: 2025-01-28

## 2025-01-23 VITALS — HEIGHT: 65 IN | WEIGHT: 130 LBS

## 2025-01-23 DIAGNOSIS — D49.2 NEOPLASM OF UNSPECIFIED BEHAVIOR OF BONE, SOFT TISSUE, AND SKIN: ICD-10-CM

## 2025-01-23 DIAGNOSIS — B07.8 OTHER VIRAL WARTS: ICD-10-CM

## 2025-01-23 DIAGNOSIS — L57.8 OTHER SKIN CHANGES DUE TO CHRONIC EXPOSURE TO NONIONIZING RADIATION: ICD-10-CM

## 2025-01-23 DIAGNOSIS — L81.4 OTHER MELANIN HYPERPIGMENTATION: ICD-10-CM

## 2025-01-23 DIAGNOSIS — L82.1 OTHER SEBORRHEIC KERATOSIS: ICD-10-CM

## 2025-01-23 DIAGNOSIS — D22 MELANOCYTIC NEVI: ICD-10-CM

## 2025-01-23 DIAGNOSIS — Z71.89 OTHER SPECIFIED COUNSELING: ICD-10-CM

## 2025-01-23 DIAGNOSIS — D18.0 HEMANGIOMA: ICD-10-CM

## 2025-01-23 PROBLEM — D22.5 MELANOCYTIC NEVI OF TRUNK: Status: ACTIVE | Noted: 2025-01-23

## 2025-01-23 PROBLEM — D18.01 HEMANGIOMA OF SKIN AND SUBCUTANEOUS TISSUE: Status: ACTIVE | Noted: 2025-01-23

## 2025-01-23 PROCEDURE — OTHER PRESCRIPTION MEDICATION MANAGEMENT: OTHER

## 2025-01-23 PROCEDURE — OTHER PRESCRIPTION: OTHER

## 2025-01-23 PROCEDURE — OTHER MIPS QUALITY: OTHER

## 2025-01-23 PROCEDURE — OTHER BENIGN DESTRUCTION: OTHER

## 2025-01-23 PROCEDURE — 99213 OFFICE O/P EST LOW 20 MIN: CPT | Mod: 25

## 2025-01-23 PROCEDURE — 17110 DESTRUCT B9 LESION 1-14: CPT

## 2025-01-23 PROCEDURE — 11102 TANGNTL BX SKIN SINGLE LES: CPT | Mod: 59

## 2025-01-23 PROCEDURE — OTHER BIOPSY BY SHAVE METHOD: OTHER

## 2025-01-23 PROCEDURE — OTHER COUNSELING: OTHER

## 2025-01-23 RX ORDER — TRETIONIN 0.5 MG/G
CREAM TOPICAL QD
Qty: 45 | Refills: 2 | Status: ERX | COMMUNITY
Start: 2025-01-23

## 2025-01-23 ASSESSMENT — LOCATION DETAILED DESCRIPTION DERM
LOCATION DETAILED: RIGHT PROXIMAL RADIAL PALMAR INDEX FINGER
LOCATION DETAILED: LEFT PROXIMAL CALF
LOCATION DETAILED: LEFT MEDIAL UPPER BACK
LOCATION DETAILED: LEFT SUPERIOR MEDIAL UPPER BACK

## 2025-01-23 ASSESSMENT — LOCATION ZONE DERM
LOCATION ZONE: TRUNK
LOCATION ZONE: LEG
LOCATION ZONE: FINGER

## 2025-01-23 ASSESSMENT — LOCATION SIMPLE DESCRIPTION DERM
LOCATION SIMPLE: RIGHT INDEX FINGER
LOCATION SIMPLE: LEFT UPPER BACK
LOCATION SIMPLE: LEFT CALF

## 2025-01-23 NOTE — PROCEDURE: BENIGN DESTRUCTION
Anesthesia Volume In Cc: 0.5
Include Z78.9 (Other Specified Conditions Influencing Health Status) As An Associated Diagnosis?: No
Post-Care Instructions: I reviewed with the patient in detail post-care instructions. Patient is to wear sunprotection, and avoid picking at any of the treated lesions. Pt may apply Vaseline to crusted or scabbing areas.
Detail Level: Zone
Consent: The patient's consent was obtained including but not limited to risks of crusting, scabbing, blistering, scarring, darker or lighter pigmentary change, recurrence, incomplete removal and infection.
Medical Necessity Information: It is in your best interest to select a reason for this procedure from the list below. All of these items fulfill various CMS LCD requirements except the new and changing color options.
Treatment Number (Will Not Render If 0): 0
Medical Necessity Clause: This procedure was medically necessary because the lesions that were treated were:

## 2025-01-23 NOTE — HPI: FULL BODY SKIN EXAMINATION
What Type Of Note Output Would You Prefer (Optional)?: Standard Output
What Is The Reason For Today's Visit?: Full Body Skin Examination
What Is The Reason For Today's Visit? (Being Monitored For X): the re-examination of lesions previously examined
Additional History: Pt reports no concerns and presents for further evaluation.

## 2025-01-23 NOTE — PROCEDURE: BIOPSY BY SHAVE METHOD
Detail Level: Detailed
Depth Of Biopsy: dermis
Was A Bandage Applied: Yes
Size Of Lesion In Cm: 0.5
X Size Of Lesion In Cm: 0
Biopsy Type: H and E
Biopsy Method: Dermablade
Anesthesia Type: 1% lidocaine with epinephrine and a 1:10 solution of 8.4% sodium bicarbonate
Hemostasis: Drysol
Wound Care: Petrolatum
Dressing: bandage
Destruction After The Procedure: No
Type Of Destruction Used: Curettage
Curettage Text: The wound bed was treated with curettage after the biopsy was performed.
Cryotherapy Text: The wound bed was treated with cryotherapy after the biopsy was performed.
Electrodesiccation Text: The wound bed was treated with electrodesiccation after the biopsy was performed.
Electrodesiccation And Curettage Text: The wound bed was treated with electrodesiccation and curettage after the biopsy was performed.
Silver Nitrate Text: The wound bed was treated with silver nitrate after the biopsy was performed.
Lab: -[id number]
Medical Necessity Information: It is in your best interest to select a reason for this procedure from the list below. All of these items fulfill various CMS LCD requirements except the new and changing color options.
Consent: Written consent was obtained and risks were reviewed including but not limited to scarring, infection, bleeding, scabbing, incomplete removal, nerve damage and allergy to anesthesia.
Post-Care Instructions: I reviewed with the patient in detail post-care instructions. Patient is to keep the biopsy site dry overnight, and then apply bacitracin twice daily until healed. Patient may apply hydrogen peroxide soaks to remove any crusting.
Notification Instructions: Patient will be notified of biopsy results. However, patient instructed to call the office if not contacted within 2 weeks.
Billing Type: Third-Party Bill
Information: Selecting Yes will display possible errors in your note based on the variables you have selected. This validation is only offered as a suggestion for you. PLEASE NOTE THAT THE VALIDATION TEXT WILL BE REMOVED WHEN YOU FINALIZE YOUR NOTE. IF YOU WANT TO FAX A PRELIMINARY NOTE YOU WILL NEED TO TOGGLE THIS TO 'NO' IF YOU DO NOT WANT IT IN YOUR FAXED NOTE.

## 2025-02-04 DIAGNOSIS — G47.09 OTHER INSOMNIA: ICD-10-CM

## 2025-02-04 RX ORDER — TRAZODONE HYDROCHLORIDE 100 MG/1
100 TABLET ORAL AT BEDTIME
Qty: 90 TABLET | Refills: 3 | OUTPATIENT
Start: 2025-02-04

## 2025-02-05 ENCOUNTER — APPOINTMENT (OUTPATIENT)
Dept: URBAN - METROPOLITAN AREA CLINIC 259 | Age: 55
Setting detail: DERMATOLOGY
End: 2025-02-05

## 2025-02-05 VITALS — WEIGHT: 130 LBS | HEIGHT: 65 IN

## 2025-02-05 DIAGNOSIS — D485 NEOPLASM OF UNCERTAIN BEHAVIOR OF SKIN: ICD-10-CM

## 2025-02-05 PROBLEM — D48.5 NEOPLASM OF UNCERTAIN BEHAVIOR OF SKIN: Status: ACTIVE | Noted: 2025-02-05

## 2025-02-05 PROCEDURE — OTHER EXCISION: OTHER

## 2025-02-05 PROCEDURE — OTHER COUNSELING: OTHER

## 2025-02-05 PROCEDURE — OTHER MIPS QUALITY: OTHER

## 2025-02-05 PROCEDURE — 11402 EXC TR-EXT B9+MARG 1.1-2 CM: CPT

## 2025-02-05 ASSESSMENT — LOCATION ZONE DERM: LOCATION ZONE: LEG

## 2025-02-05 ASSESSMENT — LOCATION SIMPLE DESCRIPTION DERM: LOCATION SIMPLE: LEFT CALF

## 2025-02-05 ASSESSMENT — LOCATION DETAILED DESCRIPTION DERM: LOCATION DETAILED: LEFT PROXIMAL CALF

## 2025-02-05 NOTE — PROCEDURE: EXCISION

## 2025-02-13 ENCOUNTER — VIRTUAL VISIT (OUTPATIENT)
Dept: FAMILY MEDICINE | Facility: CLINIC | Age: 55
End: 2025-02-13

## 2025-02-13 DIAGNOSIS — G47.09 OTHER INSOMNIA: ICD-10-CM

## 2025-02-13 DIAGNOSIS — G43.009 MIGRAINE WITHOUT AURA AND WITHOUT STATUS MIGRAINOSUS, NOT INTRACTABLE: Primary | ICD-10-CM

## 2025-02-13 DIAGNOSIS — Z13.6 CARDIOVASCULAR SCREENING; LDL GOAL LESS THAN 160: ICD-10-CM

## 2025-02-13 DIAGNOSIS — L50.1 CHRONIC IDIOPATHIC URTICARIA: ICD-10-CM

## 2025-02-13 DIAGNOSIS — Z76.89 ENCOUNTER TO ESTABLISH CARE: ICD-10-CM

## 2025-02-13 DIAGNOSIS — N95.1 MENOPAUSAL SYNDROME (HOT FLASHES): ICD-10-CM

## 2025-02-13 DIAGNOSIS — J31.0 CHRONIC RHINITIS: ICD-10-CM

## 2025-02-13 DIAGNOSIS — R87.623 PAPANICOLAOU SMEAR OF VAGINA WITH HIGH GRADE SQUAMOUS INTRAEPITHELIAL LESION (HGSIL): ICD-10-CM

## 2025-02-13 RX ORDER — ESTRADIOL 0.04 MG/D
PATCH, EXTENDED RELEASE TRANSDERMAL
COMMUNITY
Start: 2025-01-04

## 2025-02-13 RX ORDER — MONTELUKAST SODIUM 10 MG/1
TABLET ORAL
COMMUNITY
Start: 2025-01-02

## 2025-02-13 RX ORDER — TRETINOIN 0.5 MG/G
CREAM TOPICAL
COMMUNITY
Start: 2025-01-26

## 2025-02-13 RX ORDER — AZELASTINE 1 MG/ML
2 SPRAY, METERED NASAL 2 TIMES DAILY
COMMUNITY
Start: 2025-02-03

## 2025-02-13 RX ORDER — PROGESTERONE 100 MG/1
CAPSULE ORAL
COMMUNITY
Start: 2025-01-31

## 2025-02-13 RX ORDER — MOMETASONE FUROATE 50 UG/1
SPRAY NASAL
COMMUNITY
Start: 2025-01-19

## 2025-02-13 NOTE — PROGRESS NOTES
Earlene is a 54 year old who is being evaluated via a billable video visit.    How would you like to obtain your AVS? MyChart  If the video visit is dropped, the invitation should be resent by: Text to cell phone: [phone]  Will anyone else be joining your video visit? No      Assessment & Plan   Problem List Items Addressed This Visit          Nervous and Auditory    Migraine without aura and without status migrainosus, not intractable - Primary       Musculoskeletal and Integumentary    Chronic idiopathic urticaria    Relevant Medications    azelastine (ASTELIN) 0.1 % nasal spray    NASONEX 24HR 50 MCG/ACT nasal spray    montelukast (SINGULAIR) 10 MG tablet    tretinoin (RETIN-A) 0.05 % external cream       Other    CARDIOVASCULAR SCREENING; LDL GOAL LESS THAN 160    s/p Hysterectomy, cervical pathology benign, no need for further pap smears    Other insomnia     Other Visit Diagnoses       Chronic rhinitis        Relevant Medications    azelastine (ASTELIN) 0.1 % nasal spray    NASONEX 24HR 50 MCG/ACT nasal spray    montelukast (SINGULAIR) 10 MG tablet    Menopausal syndrome (hot flashes)        Relevant Medications    tretinoin (RETIN-A) 0.05 % external cream    Encounter to establish care                 History reviewed and updated.  She still is having some nasal drainage but better with inhalers Marisela pot and Singulair.  Using albuterol as needed and other inhaled steroid was not helpful.  She has upcoming follow-up with ENT.  Continues to follow with endocrinology for managing her hormones now.  She has no other concerns today.     The longitudinal plan of care for the diagnosis(es)/condition(s) as documented were addressed during this visit. Due to the added complexity in care, I will continue to support Earlene in the subsequent management and with ongoing continuity of care.        Marlin Henao is a 54 year old, presenting for the following health issues:  Recheck Medication        2/13/2025    11:39  AM   Additional Questions   Roomed by Surendra     History of Present Illness       Reason for visit:  Recheck medication   She is taking medications regularly.     History reviewed and updated.  Patient needing to establish with new provider.  Has refills currently and migraines have been well-controlled.  Hot flash issues have been resolved with hormone replacement.  No side effects that she is reported.  Continued chronic sinusitis      Review of Systems  Constitutional, HEENT, cardiovascular, pulmonary, GI, , musculoskeletal, neuro, skin, endocrine and psych systems are negative, except as otherwise noted.      Objective    Vitals - Patient Reported  Pain Score: No Pain (0)        Physical Exam   GENERAL: alert and no distress  EYES: Eyes grossly normal to inspection.  No discharge or erythema, or obvious scleral/conjunctival abnormalities.  RESP: No audible wheeze, cough, or visible cyanosis.    SKIN: Visible skin clear. No significant rash, abnormal pigmentation or lesions.  NEURO: Cranial nerves grossly intact.  Mentation and speech appropriate for age.  PSYCH: Appropriate affect, tone, and pace of words          Video-Visit Details    Type of service:  Video Visit   Originating Location (pt. Location): Home    Distant Location (provider location):  On-site  Platform used for Video Visit: Nicki  Signed Electronically by: Nazario Domínguez MD

## 2025-02-24 ENCOUNTER — ANCILLARY ORDERS (OUTPATIENT)
Dept: CT IMAGING | Facility: CLINIC | Age: 55
End: 2025-02-24

## 2025-02-24 DIAGNOSIS — J32.9 PURULENT POSTNASAL DRAINAGE: Primary | ICD-10-CM

## 2025-03-05 ENCOUNTER — MEDICAL CORRESPONDENCE (OUTPATIENT)
Dept: HEALTH INFORMATION MANAGEMENT | Facility: CLINIC | Age: 55
End: 2025-03-05

## 2025-03-05 ENCOUNTER — HOSPITAL ENCOUNTER (OUTPATIENT)
Dept: CT IMAGING | Facility: CLINIC | Age: 55
Discharge: HOME | End: 2025-03-05
Attending: OTOLARYNGOLOGY

## 2025-03-05 DIAGNOSIS — J32.9 PURULENT POSTNASAL DRAINAGE: ICD-10-CM

## 2025-03-05 PROCEDURE — 70486 CT MAXILLOFACIAL W/O DYE: CPT

## 2025-03-24 ENCOUNTER — APPOINTMENT (OUTPATIENT)
Dept: URBAN - METROPOLITAN AREA CLINIC 259 | Age: 55
Setting detail: DERMATOLOGY
End: 2025-03-25

## 2025-03-24 DIAGNOSIS — Z87.2 PERSONAL HISTORY OF DISEASES OF THE SKIN AND SUBCUTANEOUS TISSUE: ICD-10-CM

## 2025-03-24 DIAGNOSIS — B07.8 OTHER VIRAL WARTS: ICD-10-CM

## 2025-03-24 PROCEDURE — OTHER BENIGN DESTRUCTION: OTHER

## 2025-03-24 PROCEDURE — 17110 DESTRUCT B9 LESION 1-14: CPT

## 2025-03-24 PROCEDURE — OTHER ADDITIONAL NOTES: OTHER

## 2025-03-24 PROCEDURE — OTHER COUNSELING: OTHER

## 2025-03-24 PROCEDURE — OTHER MIPS QUALITY: OTHER

## 2025-03-24 PROCEDURE — 99212 OFFICE O/P EST SF 10 MIN: CPT | Mod: 25

## 2025-03-24 ASSESSMENT — LOCATION DETAILED DESCRIPTION DERM
LOCATION DETAILED: LEFT PROXIMAL CALF
LOCATION DETAILED: RIGHT PROXIMAL RADIAL PALMAR INDEX FINGER

## 2025-03-24 ASSESSMENT — LOCATION SIMPLE DESCRIPTION DERM
LOCATION SIMPLE: LEFT CALF
LOCATION SIMPLE: RIGHT INDEX FINGER

## 2025-03-24 ASSESSMENT — LOCATION ZONE DERM
LOCATION ZONE: LEG
LOCATION ZONE: FINGER

## 2025-03-24 NOTE — PROCEDURE: BENIGN DESTRUCTION
Include Z78.9 (Other Specified Conditions Influencing Health Status) As An Associated Diagnosis?: No
Post-Care Instructions: I reviewed with the patient in detail post-care instructions. Patient is to wear sunprotection, and avoid picking at any of the treated lesions. Pt may apply Vaseline to crusted or scabbing areas.
Detail Level: Zone
Consent: The patient's consent was obtained including but not limited to risks of crusting, scabbing, blistering, scarring, darker or lighter pigmentary change, recurrence, incomplete removal and infection.
Medical Necessity Information: It is in your best interest to select a reason for this procedure from the list below. All of these items fulfill various CMS LCD requirements except the new and changing color options.
Treatment Number (Will Not Render If 0): 2
Medical Necessity Clause: This procedure was medically necessary because the lesions that were treated were:
Render Post-Care Instructions In Note?: yes

## 2025-03-24 NOTE — PROCEDURE: ADDITIONAL NOTES
Render Risk Assessment In Note?: no
Detail Level: Simple
Additional Notes: Ok to apply vitamin e oil or Silagen
Additional Notes: Recommended to wash off Canthacur in 4-5 hours, sooner with any pain or discomfort.\\nOk to call for wart peel

## 2025-04-18 ENCOUNTER — MEDICAL CORRESPONDENCE (OUTPATIENT)
Dept: HEALTH INFORMATION MANAGEMENT | Facility: CLINIC | Age: 55
End: 2025-04-18

## 2025-04-21 ENCOUNTER — RX ONLY (RX ONLY)
Age: 55
End: 2025-04-21

## 2025-04-21 RX ORDER — SALICYLIC ACID
POWDER (GRAM) MISCELLANEOUS
Qty: 15 | Refills: 5 | Status: ERX | COMMUNITY
Start: 2025-04-21

## 2025-04-22 ENCOUNTER — MYC MEDICAL ADVICE (OUTPATIENT)
Dept: GASTROENTEROLOGY | Facility: CLINIC | Age: 55
End: 2025-04-22

## 2025-04-22 NOTE — PROGRESS NOTES
"Earlene Mark is a 54 year old patient who is being evaluated via a billable virtual visit.       How would you like to obtain your AVS? {AVS Preference:011936}  If the video visit is dropped, the invitation should be resent by: {video visit invitation (Optional) :[id number]}  Will anyone else be joining your video visit? {:[id number]}    Video-Visit Details     Type of service:  Video Visit     Video Start Time (time video started): ***     Video End Time (time video stopped): ***     Physician has received verbal consent for a Video Visit from the patient? {YES-NO  Default Yes:4444}     Originating Location (pt. Location): {video visit patient location:769530::\"Home\"}    Distant Location (provider location):  {virtual location provider:101154}    Platform used for Video Visit: {Virtual Visit Platforms:408606::\"Jybe\"}  " spicy foods and alcohol.  She brought Nexium 20 mg tablets and has been taking 10 mg dose twice a day.  She did some research on Internet about possible causes of her symptoms.  Request evaluation for LPR.  Patient denies vomiting, abdominal pain, bloating, or problems with bowel elimination.  Denies acid reflux  Has been followed by ENT for posterior rhinorrhea. Symptoms improved with nasal sprays.  Patient said that she does not like taking lots of medications, asking if she could stop some of her medications such as montelukast, albuterol, and nasal sprays.  I deferred this decision to her ENT and primary care provider.    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     has a past surgical history that includes  DELIVERY ONLY (); colposcopy,loop electrd cervix excis (); REMOVAL OF TONSILS,12+ Y/O (); test not found (2001); CONIZATION CERVIX,KNIFE/LASER (08); and hysterectomy, vaginal (02/02/10).     reports that she has never smoked. She has never used smokeless tobacco. She reports that she does not drink alcohol and does not use drugs.    family history includes Arthritis in her mother; Asthma in her son; Blood Disease (age of onset: 51) in her father; Cancer in her  paternal grandfather and paternal uncle; Chronic Obstructive Pulmonary Disease in her mother; Congenital Anomalies in her son; Diabetes in her paternal grandfather and paternal grandmother; Hypertension in her father; Lipids in her father and mother; Muscular Dystrophy (age of onset: 19) in her brother; Neurologic Disorder in her brother, father, and mother.    ALLERGIES:     Allergies   Allergen Reactions    Morphine     Prochlorperazine      compazine -dystonic reaction    Seasonal Allergies        PERTINENT MEDICATIONS:    Current Outpatient Medications:     albuterol (2.5 MG/3ML) 0.083% neb solution, Take 1 vial (2.5 mg) by nebulization every 4 hours as needed for wheezing Hold on file. Will call if needed, Disp: 30 vial, Rfl: 0    albuterol (PROAIR HFA/PROVENTIL HFA/VENTOLIN HFA) 108 (90 Base) MCG/ACT inhaler, Inhale 2 puffs into the lungs every 4 hours as needed for shortness of breath / dyspnea or wheezing, Disp: 1 Inhaler, Rfl: 0    azelastine (ASTELIN) 0.1 % nasal spray, Spray 2 sprays into both nostrils 2 times daily., Disp: , Rfl:     co-enzyme Q-10 100 MG CAPS capsule, Take 100 mg by mouth daily, Disp: , Rfl:     Cyanocobalamin (VITAMIN B-12) 5000 MCG TBDP, Take 1 tablet by mouth daily, Disp: , Rfl:     cyclobenzaprine (FLEXERIL) 5 MG tablet, Take 1 tablet (5 mg) by mouth 3 times daily as needed for muscle spasms (Patient not taking: Reported on 2/13/2025), Disp: 30 tablet, Rfl: 0    estradiol (VAGIFEM) 10 MCG TABS vaginal tablet, INSERT 1 TABLET VAGINALLY AT NIGHT FOR 2 WEEKS MAINTENANCE 1 THREE DAYS A WEEK (Patient not taking: Reported on 2/13/2025), Disp: , Rfl:     estradiol (VIVELLE-DOT) 0.0375 MG/24HR BIW patch, 1 PATCH TO SKIN TO LOWER ABDOMEN TRANSDERMAL TWO TIMES A WEEK, Disp: , Rfl:     fluticasone (FLOVENT HFA) 110 MCG/ACT inhaler, Inhale 1 puff into the lungs 2 times daily., Disp: 12 g, Rfl: 3    levocetirizine (XYZAL) 5 MG tablet, Take 1 tablet (5 mg) by mouth 2 times daily, Disp: 60 tablet,  Rfl: 0    meloxicam (MOBIC) 15 MG tablet, Take 1 tablet (15 mg) by mouth daily, Disp: 90 tablet, Rfl: 0    montelukast (SINGULAIR) 10 MG tablet, take 1 tablet by mouth daily in the evening, Disp: , Rfl:     MULTIPLE VITAMIN OR TABS, 1 TABLET ORALLY DAILY, Disp: , Rfl: 0    NASONEX 24HR 50 MCG/ACT nasal spray, TAKE 2 SPRAYS IN EACH NOSTRIL ONCE DAILY., Disp: , Rfl:     Omega-3 Fatty Acids (OMEGA 3 PO), Take 1,250 mg by mouth 2 times daily, Disp: , Rfl:     progesterone (PROMETRIUM) 100 MG capsule, TAKE 1 TO 3 CAPSULES BY MOUTH DAILY AT BEDTIME, Disp: , Rfl:     rizatriptan (MAXALT) 10 MG tablet, Take 1/2 to 1 tablet by mouth at onset of migraine, may repeat in 2 hours -Max 3 tablet(s) in 24 hours, Disp: 54 tablet, Rfl: 3    Sodium Chloride-Sodium Bicarb (AYR SALINE NASAL RINSE NA), , Disp: , Rfl:     TESTOSTERONE 2MG, , Disp: , Rfl:     traZODone (DESYREL) 100 MG tablet, Take 1 tablet (100 mg) by mouth at bedtime., Disp: 90 tablet, Rfl: 3    traZODone (DESYREL) 50 MG tablet, Take 1 tablet (50 mg) by mouth At Bedtime, Disp: 30 tablet, Rfl: 0    tretinoin (RETIN-A) 0.05 % external cream, , Disp: , Rfl:     Zinc 30 MG TABS, Take 2 tablets by mouth At Bedtime, Disp: , Rfl:       ROS: 10pt ROS performed and otherwise negative.    PHYSICAL EXAMINATION:  Wt:   Wt Readings from Last 2 Encounters:   09/27/23 61.1 kg (134 lb 11.2 oz)   07/19/23 65.6 kg (144 lb 9.6 oz)      Physical Exam  Vitals reviewed: LMP 02/01/2010    Constitutional: aaox3, cooperative, pleasant, not dyspneic/diaphoretic, no acute distress  Eyes: Sclera anicteric/injected  Respiratory: Unlabored breathing, speaking in full sentences.   Psych: Normal affect, speech is clear and appropriate.Neatly groomed    RECENT LABS:   Recent Labs   Lab Test 12/05/22  1408 08/17/20  1530   WBC 9.5  --    HGB 12.8 12.7   HCT 38.2  --      --      Recent Labs   Lab Test 03/15/19  0814   ALT 38   AST 30     No lab results found.  TSH   Date Value Ref Range Status  "  01/05/2023 2.16 0.40 - 4.00 mU/L Final   01/13/2021 2.02 0.40 - 4.00 mU/L Final         ASSESSMENT/PLAN:    ICD-10-CM    1. Dysphagia, unspecified type  R13.10 XR Esophagram      2. Chronic throat clearing  R09.89 Adult GI  Referral - Consult Only     XR Esophagram      3. Nausea  R11.0          Earlene Mark is a 54 year old female who presents to GI clinic for a consultation on chronic throat clearing and some discomfort on swallowing.  Ongoing symptoms.  Patient requested evaluation for LPR.  She believes that her wheezing and productive cough could be related to silent acid reflux.  She prefers to get off her inhaler, nasal sprays, and singular although she reported them being effective.  \"I do not like taking drugs\".  Has questions on possible etiology of hives that occur every night for several years.  I reviewed the patient's  medical records.  Noted she has been followed by ENT for recurrent sinusitis and postnasal rhinorrhea. The patient was seen by Dr. Borrego at the allergy/immunology clinic in 2021 for environmental allergies and chronic urticaria.  Patient was responding to antihistamines.  She was on Xyzal with also a good response.  Noted that her cervical x-ray showed severe facet arthropathy on the left at C3-C4.    I explained to the patient that although LPR could cause some cough, her symptoms are likely related to asthma or COPD, environmental allergies, and postnasal drip.  Deferred questions regarding discontinuation of her chronic medications to her PCP or other specialty providers that she saw in the past.  Explained clinical presentation and management of LPR.  Briefly talked about silent acid reflux.  Educated on studies that could be ordered for evaluation.  Patient informed the provider that she has no insurance.  She prefers to have inexpensive studies if possible.  Will order x-ray barium esophagram.  Suggested to take esomeprazole 20 mg in the morning.  Patient stated that " Mitzy bought Esomeprazole in form of tablet and breaks it in half to take 10 mg in the morning and 10 mg before bed.  Educated not to split the tablet.    We discussed possible causes of nausea including anxiety, stress, migraines, side effect of medications, GERD, gastritis, food intolerance, and infection.  Patient has no emesis.  Stated that esomeprazole helps her nausea.  If symptoms persist, may offer ondansetron.  Patient verbalized understanding and appreciation of care provided. Stated that all of the questions were answered to her/his satisfaction.  Follow up in 4 to 6 weeks  This note was created with Dragon voice recognition software. Inadvertent minor typographic errors may occur in transcription. Feel free to contact the provider if you have any questions.  I sincerely appreciate an opportunity to provide consultation for this pleasant patient.    NEELIMA Martinez  [facility],  Gastroenterology,  [place], [place]

## 2025-04-30 NOTE — PATIENT INSTRUCTIONS
"It was a pleasure taking care of you today.  I've included a brief summary of our discussion and care plan from today's visit below.  Please review this information with your primary care provider.  ______________________________________________________________________    My recommendations are summarized as follows:    1.  Take esomeprazole (Nexium) 20 mg every morning, 30 to 60 minutes before breakfast and other medications.    2.  I placed an order for x-ray esophagram for evaluation of your esophagus.  Someone will contact you to schedule the study.    3.  Below, I placed more information on gastroesophageal reflux disease.  Please review at your convenience.    4.  I am not convinced that your productive cough with yellow sputum and wheezing are necessary related to GI problems.  Most likely, it is caused by postnasal drip, environmental allergies, asthma, or COPD.    Return to GI Clinic in 4 to 6 weeks to review your progress.    ______________________________________________________________________  Gastroesophageal reflux  Gastroesophageal reflux, also called \"acid reflux,\" occurs when the stomach contents back up into the esophagus and/or mouth. Occasional reflux is normal and can happen in healthy infants, children, and adults, most often after eating a large meal. Most episodes are brief and do not cause bothersome symptoms or complications.   By contrast, people with gastroesophageal reflux disease (GERD) experience bothersome symptoms or damage to the esophagus as a result of acid reflux. Symptoms of GERD can include heartburn, regurgitation, and difficulty or pain with swallowing.  The main cause of GERD is a transient relaxation or weakening of the lower esophageal sphincter (LES) which allows regurgitation of gastric acid and other gastric contents, including bile, back into the esophagus. The esophageal lining is susceptible to irritation by acid because it does not have the thick mucus protection " of the stomach. Some people with GERD do not experience heartburn but may have burning sensation in the mouth, a feeling that food is stuck at any level of the esophagus, or hoarseness in the morning.  There are a number of predisposing factors associated with GERD, including a hiatal hernia, cigarette smoking, alcohol use, being overweight or obese, and pregnancy. Foods such as citrus fruits, chocolate, caffeinated drinks, fried foods, garlic, onions, spicy foods, and tomato-based foods, such as chili, pizza, and spaghetti sauce, are associated with heartburn symptoms. Consumption of large high-fat meals requires prolonged gastric passage times and the increased stomach pressure may lead to movement of hydrochloric acid from the stomach into the esophagus. Additionally, lying prone after a meal promotes backflow of stomach contents and the development of symptoms.      Lifestyle modifications for gastroesophageal reflux disease (GERD).   1. Change your eating habits.  -- It's best to eat several small meals instead of two or three large meals.  -- After you eat, wait 2 to 3 hours before you lie down. Late-night snacks aren't a good idea.   -- Chocolate, mint, and alcohol can make GERD worse. They relax the valve between the esophagus and the stomach.  -- Spicy foods, foods that have a lot of acid (like tomatoes and oranges), foods with high fat content, and coffee can make GERD symptoms worse in some people. If your symptoms are worse after you eat certain foods, try to avoid them.     2. Do not smoke or chew tobacco. Saliva helps to neutralize refluxed acid, and smoking reduces the amount of saliva in the mouth and throat. Smoking also lowers the pressure in the lower esophageal sphincter and provokes coughing, causing frequent episodes of acid reflux in the esophagus.     3. If you have GERD symptoms at night, raise the head of your bed 6 in. (15 cm) to 8 in. (20 cm) by putting the frame on blocks or placing a  foam wedge under the head of your mattress. (Adding extra pillows does not work.)    4. Avoid or reduce pressure on your stomach. Don't wear tight clothing around your middle.    5. Lose weight if you need to. Losing just 5 to 10 pounds can help.    6.Try diaphragmatic (belly) breathing. Research has indicated that people with GERD who practice belly breathing after eating reduce how often they experience acid reflux. Diaphragmatic breathing will reduce pressure within the stomach and increases tone of lower esophageal sphincter.  Practice these breathing exercises 10 times each. Try to do your exercises 3 to 4 times each day. You can lie on your back or sit up straight in a chair to do these exercises.  ?Diaphragmatic breathing :  Place 1 hand over your abdomen and the other on your chest.  Slowly take a deep breath in through your nose. When you do this, think about your breath moving the hand on your abdomen out. This pulls more air into your lungs. The hand on your chest should not move very much if you are breathing the right way.  Breathe out slowly through pursed lips. Gently press on your belly as you breathe out. This will push up on your diaphragm to help get your air out.  Repeat.       ____________________________________________________________________  Please see below for any additional questions and scheduling guidelines.    Sign up for [employer]: [employer] patient portal serves as a secure platform for accessing your medical records from the [facility]. Additionally, [employer] facilitates easy, timely, and secure messaging with your care team. If you have not signed up, you may do so by using the provided code or calling [phone].    Coordinating your care after your visit:  There are multiple options for scheduling your follow-up care based on your provider's recommendation.    How do I schedule a follow-up clinic appointment:   After your appointment, you may receive scheduling  assistance with the Clinic Coordinators by having a seat in the waiting room and a Clinic Coordinator will call you up to schedule.  Virtual visits or after you leave the clinic:  Your provider has placed a follow-up order in the [employer] portal for scheduling your return appointment. A member of the scheduling team will contact you to schedule.  [employer] Scheduling: Timely scheduling through [employer] is advised to ensure appointment availability.   Call to schedule: You may schedule your follow-up appointment(s) by calling [phone], option 1.    How do I schedule my endoscopy or colonoscopy procedure:  If a procedure, such as a colonoscopy or upper endoscopy was ordered by your provider, the scheduling team will contact you to schedule this procedure. Or you may choose to call to schedule at   [phone], option 2.  Please allow 20-30 minutes when scheduling a procedure.    How do I get my blood work done? To get your blood work done, you need to schedule a lab appointment at an [facility] Laboratory. There are multiple ways to schedule:   At the clinic: The Clinic Coordinator you meet after your visit can help you schedule a lab appointment.   [employer] scheduling: [employer] offers online lab scheduling at all [facility] laboratory locations.   Call to schedule: You can call [phone] to schedule your lab appointment.    How do I schedule my imaging study: To schedule imaging studies, such as CT scans, ultrasounds, MRIs, or X-rays, contact Imaging Services at [phone].    How do I schedule a referral to another doctor: If your provider recommended a referral to another specialist(s), the referral order was placed by your provider. You will receive a phone call to schedule this referral, or you may choose to call the number attached to the referral to self-schedule.    For Post-Visit Question(s):  For any inquiries following today's visit:  Please utilize [employer] messaging and allow 48 hours for  reply or contact the Call Center during normal business hours at [phone], option 3.  For Emergent After-hours questions, contact the On-Call GI Fellow through the [facility]  at [phone].  In addition, you may contact your Nurse directly using the provided contact information.    Test Results:  Test results will be accessible via [employer] in compliance with the 21st Century Cures Act. This means that your results will be available to you at the same time as your provider. Often you may see your results before your provider does. Results are reviewed by staff within two weeks with communication follow-up. Results may be released in the patient portal prior to your care team review.    Prescription Refill(s):  Medication prescribed by your provider will be addressed during your visit. For future refills, please coordinate with your pharmacy. If you have not had a recent clinic visit or routine labs, for your safety, your provider may not be able to refill your prescription.     Sincerely,  NEELIMA Martinez,  [facility],  Division of Gastroenterology   ([facility])

## 2025-05-01 ENCOUNTER — VIRTUAL VISIT (OUTPATIENT)
Dept: GASTROENTEROLOGY | Facility: CLINIC | Age: 55
End: 2025-05-01
Attending: OTOLARYNGOLOGY

## 2025-05-01 DIAGNOSIS — R13.10 DYSPHAGIA, UNSPECIFIED TYPE: Primary | ICD-10-CM

## 2025-05-01 DIAGNOSIS — R11.0 NAUSEA: ICD-10-CM

## 2025-05-01 DIAGNOSIS — R09.89 CHRONIC THROAT CLEARING: ICD-10-CM

## 2025-05-06 ENCOUNTER — HOSPITAL ENCOUNTER (OUTPATIENT)
Dept: GENERAL RADIOLOGY | Facility: CLINIC | Age: 55
Discharge: HOME | End: 2025-05-06
Attending: NURSE PRACTITIONER | Admitting: NURSE PRACTITIONER

## 2025-05-06 DIAGNOSIS — R13.10 DYSPHAGIA, UNSPECIFIED TYPE: ICD-10-CM

## 2025-05-06 DIAGNOSIS — R09.89 CHRONIC THROAT CLEARING: ICD-10-CM

## 2025-05-06 PROCEDURE — 250N000013 HC RX MED GY IP 250 OP 250 PS 637: Performed by: RADIOLOGY

## 2025-05-06 PROCEDURE — 74221 X-RAY XM ESOPHAGUS 2CNTRST: CPT

## 2025-05-06 RX ADMIN — ANTACID/ANTIFLATULENT 4 G: 380; 550; 10; 10 GRANULE, EFFERVESCENT ORAL at 08:35

## 2025-05-07 ENCOUNTER — RESULTS FOLLOW-UP (OUTPATIENT)
Dept: GASTROENTEROLOGY | Facility: CLINIC | Age: 55
End: 2025-05-07

## 2025-06-14 ENCOUNTER — HEALTH MAINTENANCE LETTER (OUTPATIENT)
Age: 55
End: 2025-06-14

## 2025-06-19 ENCOUNTER — ANCILLARY PROCEDURE (OUTPATIENT)
Dept: GENERAL RADIOLOGY | Facility: CLINIC | Age: 55
End: 2025-06-19
Attending: STUDENT IN AN ORGANIZED HEALTH CARE EDUCATION/TRAINING PROGRAM

## 2025-06-19 ENCOUNTER — OFFICE VISIT (OUTPATIENT)
Dept: ORTHOPEDICS | Facility: CLINIC | Age: 55
End: 2025-06-19

## 2025-06-19 DIAGNOSIS — M25.512 LEFT SHOULDER PAIN: ICD-10-CM

## 2025-06-19 DIAGNOSIS — M25.512 LEFT SHOULDER PAIN: Primary | ICD-10-CM

## 2025-06-19 DIAGNOSIS — M67.912 TENDINOPATHY OF LEFT ROTATOR CUFF: Primary | ICD-10-CM

## 2025-06-19 PROCEDURE — 73030 X-RAY EXAM OF SHOULDER: CPT | Mod: TC | Performed by: RADIOLOGY

## 2025-06-19 NOTE — LETTER
2025      Earlene Mark  [address] [place] [address]      Dear Colleague,    Thank you for referring your patient, Earlene Mark, to the [facility] [place]. Please see a copy of my visit note below.    Earlene Mark  :  1970  DOS: 2025  MRN: [id number]  PCP: No Ref-Primary, Physician    Sports Medicine Clinic Visit      HPI  Earlene Mark is a 54 year old female who is seen as a self referral presenting with left shoulder pain.     - Mechanism of Injury:    - No known cause of injury, is very active working out 5 days per week in the gym. Notes a click and mechanical popping with abduction (lateral raises)   - Pertinent history and prior evaluations:    - Previously treated by me for right sided rotator cuff tendinopathy, biceps tendinopathy, glenoid labral tearing, and adhesive capsulitis.   - Did not want any corticosteroid injections and we proceeded with Voltaren gel, HEP, PT referral, OTC pain medications.    - Also saw Dr. Vivas in  for the left shoulder.  MRA revealed mild rotator cuff tendinopathy without tears of the rotator cuff tendons or labrum, and grade 3 glenohumeral chondromalacia.    - Pain Character:    - Location:  left lateral shoulder   - Character:  sharp, aching soreness  - Duration:  Chronic  - Course:  worsening  - Endorses:    - clicking/popping, grinding, pain as described above  - Denies:    - instability, numbness, tingling, radicular shooting pain, mechanical locking, weakness  - Alleviating factors:    - rest, ice, ibuprofen  - Aggravating factors:    - lifting heavy objects, overhead activities, abduction motions      - Patient Goals:    - understand the cause of the symptoms, be able to manage the symptoms successfully, return to sports  - Social History:   - 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       Review of Systems  Musculoskeletal: as above  Remainder of review of systems is  negative including constitutional, CV, pulmonary, GI, Skin and Neurologic except as noted in HPI or medical history.    Past Medical History:   Diagnosis Date     Allergic rhinitis, cause unspecified     ? 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     DEPRESSIVE DISORDER NEC 2003     Menorrhagia 2009     s/p Hysterectomy, cervical pathology benign, no need for further pap smears 1/3/2013     Wheezing 3/10/2021     Past Surgical History:   Procedure Laterality Date     COLPOSCOPY,LOOP ELECTRD CERVIX EXCIS       HC CONIZATION CERVIX,KNIFE/LASER  08    FILEMON 3     HC REMOVAL OF TONSILS,12+ Y/O       HYSTERECTOMY, VAGINAL  02/02/10    laparoscopic assisted vaginal.  benign cervix     TEST NOT FOUND  2001    Abdominoplasty (tummy tuck)     Z  DELIVERY ONLY      , Low Cervical     Family History   Problem Relation Age of Onset     Neurologic Disorder Mother         migraines     Lipids Mother      Arthritis Mother         rheumatoid     Chronic Obstructive Pulmonary Disease Mother         smoker     Hypertension Father      Lipids Father      Blood Disease Father 51        DVT  at age 51 from probable PE     Neurologic Disorder Father         Muscular Dystrophy     Muscular Dystrophy Brother 19     Diabetes Paternal Grandmother      Diabetes Paternal Grandfather      Cancer Paternal Grandfather         colon     Neurologic Disorder Brother         Muscular Dystrophy  at age 19 from an MI     Cancer Paternal Uncle         colon     Asthma Son      Congenital Anomalies Son         epilepsy hydrocephilis         Objective  LMP 2010     General: healthy, alert and in no acute distress.    HEENT: no scleral icterus or conjunctival erythema.   Skin: no suspicious lesions or rash. No jaundice.   CV: regular rhythm by palpation, 2+ distal pulses.  Resp: normal respiratory effort without  conversational dyspnea.   Psych: normal mood and affect.    Gait: nonantalgic, appropriate coordination and balance.     Neuro:        - Sensation to light touch:    - Intact throughout the UE including all peripheral nerve distributions.     MSK - Shoulder:       - Inspection:    - No significant swelling, erythema, warmth, ecchymosis, lesion, or atrophy noted.        - ROM:    - Full AROM/PROM with pain during shoulder abduction       - Palpation:    - TTP at the supraspinatus insertion.   - NTTP elsewhere.        - Strength:  (*antalgic)  - Shoulder Abduction   5-*    - Shoulder Flexion   5    - Shoulder Internal Rotation  5    - Shoulder External Rotation  5             - Special tests:        - Carson:  Pos   - Neers:  Neg    - Empty can:  Pos for pain and weakness    - [place]:  Neg    - Scarf:  Neg    - Speeds:  Neg    - Yergason:  Neg    - Apprehension/Relocation:  Neg       Radiology  I independently reviewed the available relevant imaging in the chart with my interpretations as above in HPI.     I independently reviewed today's new relevant imaging, with the following interpretation:  - XR elbow shoulder 6/19/2025 shows mild degenerative changes of the glenohumeral and acromioclavicular joints.  No acute fractures or dislocations.      Assessment  1. Tendinopathy of left rotator cuff        Plan  Earlene Mark is a pleasant 54 year old female that presents with subacute left shoulder pain.  She is very active and works out at the gym 5 to 6 days/week, and has started to notice pain in the lateral shoulder that hurts worse with shoulder abduction and overhead lifts.  Pain is located directly over the supraspinatus insertion and she has a positive Carson and empty can test today. History and physical exam appear most consistent with rotator cuff impingement on the left.     We discussed the nature of the condition and available treatment options, and mutually agreed upon the following plan:    - Imaging:           - Reviewed and independently interpreted the relevant imaging in the chart, including any imaging ordered for today's clinic.  - Reviewed results and images with patient.   - Medications:          - Discussed pharmacologic options for pain relief.   - May use NSAIDs (Ibuprofen, Naproxen) or Acetaminophen (Tylenol) as needed for pain control.   - Do not take these if previously advised to avoid them for other medical conditions.  - May also use topical medications such as lidocaine, IcyHot, BioFreeze, or Voltaren gel as needed for pain control.    - Voltaren gel is an anti-inflammatory cream that may be used up to 4 times per day over the painful area.   - Injections:          - Discussed possible injection options and alternatives.    - Injection options include: Ultrasound-guided corticosteroid injection of the subacromial bursa.  She would like to avoid steroid injections in general.    - Deferred injections today and will consider them in the future as needed.   - Therapy:          - Discussed the benefits of therapy vs home exercise program for optimization of range of motion, flexibility, strength, stability and function.   - Preference is for a home exercise program that she can utilize in her own gym.  HEP given today for rotator cuff strengthening and scapular stabilization.  - Modalities:          - May use ice, heat, massage or other modalities as needed.   - Activity:          - Encouraged to remain active and participate in regular activities as symptoms allow.   Avoid or modify exacerbating activities as needed.  - Follow up:          - As needed for re-evaluation and update to treatment plan.  - May follow up sooner for new/worsening symptoms.  - May contact clinic by phone or MyChart for questions or concerns.       Toan Serrano DO, CAQSM  [facility] - Sports Medicine  [facility] - Department of Orthopedic Surgery       Disclaimer:  This note was prepared and  written using Dragon Medical dictation software. As a result, there may be errors in the script that have gone undetected. Please consider this when interpreting the information in this note.       Again, thank you for allowing me to participate in the care of your patient.        Sincerely,        Toan Serrano, DO    Electronically signed

## 2025-06-19 NOTE — PATIENT INSTRUCTIONS
- Voltaren gel up to 4 times per day over the painful shoulder as needed.   - Home exercise program multiple times per week.

## 2025-06-19 NOTE — PROGRESS NOTES
Earlene Mark  :  1970  DOS: 2025  MRN: [id number]  PCP: No Ref-Primary, Physician    Sports Medicine Clinic Visit      HPI  Earlene Mark is a 54 year old female who is seen as a self referral presenting with left shoulder pain.     - Mechanism of Injury:    - No known cause of injury, is very active working out 5 days per week in the gym. Notes a click and mechanical locking/popping with abduction (lateral raises)   - Pertinent history and prior evaluations:    - Previously treated by me for right sided rotator cuff tendinopathy, biceps tendinopathy, glenoid labral tearing, and adhesive capsulitis.   - Did not want any corticosteroid injections and we proceeded with Voltaren gel, HEP, PT referral, OTC pain medications.    - Also saw Dr. Vivas in  for the left shoulder.  MRA revealed mild rotator cuff tendinopathy without tears of the rotator cuff tendons or labrum, and grade 3 glenohumeral chondromalacia.    - Pain Character:    - Location:  left shoulder   - Character:  sharp, sore. 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       Review of Systems  Musculoskeletal: as above  Remainder of review of systems is negative including constitutional, CV, pulmonary, GI, Skin and Neurologic except as noted in HPI or medical history.    Past Medical History:   Diagnosis Date    Allergic rhinitis, cause unspecified     ? sinus infections    ANXIETY STATE NOS 2003    FILEMON 3 - cervical intraepithelial  neoplasia grade 3 2009    DEPRESSIVE DISORDER NEC 2003    Menorrhagia 2009    s/p Hysterectomy, cervical pathology benign, no need for further pap smears 1/3/2013    Wheezing 3/10/2021     Past Surgical History:   Procedure Laterality Date    COLPOSCOPY,LOOP ELECTRD CERVIX EXCIS      HC CONIZATION CERVIX,KNIFE/LASER  08    FILEMON 3    HC REMOVAL OF TONSILS,12+ Y/O      HYSTERECTOMY, VAGINAL  02/02/10    laparoscopic assisted vaginal.  benign cervix    TEST NOT FOUND  2001    Abdominoplasty (tummy tuck)    Z  DELIVERY ONLY      , Low Cervical     Family History   Problem Relation Age of Onset    Neurologic Disorder Mother         migraines    Lipids Mother     Arthritis Mother         rheumatoid    Chronic Obstructive Pulmonary Disease Mother         smoker    Hypertension Father     Lipids Father     Blood Disease Father 51        DVT  at age 51 from probable PE    Neurologic Disorder Father         Muscular Dystrophy    Muscular Dystrophy Brother 19    Diabetes Paternal Grandmother     Diabetes Paternal Grandfather     Cancer Paternal Grandfather         colon    Neurologic Disorder Brother         Muscular Dystrophy  at age 19 from an MI    Cancer Paternal Uncle         colon    Asthma Son     Congenital Anomalies Son         epilepsy hydrocephilis         Objective  LMP 2010     General: healthy, alert and in no acute distress.    HEENT: no scleral icterus or conjunctival erythema.   Skin: no suspicious lesions or rash. No jaundice.   CV: regular rhythm by palpation, 2+ distal pulses.  Resp: normal respiratory effort without conversational dyspnea.   Psych: normal mood and affect.    Gait: nonantalgic, appropriate coordination and balance.     Neuro:        - Sensation to light touch:    - *** Intact throughout the BUE including all peripheral nerve distributions.   - Diminished sensation in the ***. Otherwise SILT in all other nerve  distributions.        - MSR:       JO-ANN CONRADE  - Biceps  2+ 2+  - Brachioradialis 2+ 2+  - Triceps  2+ 2+       - Special tests:   - Spurling's:  Neg     MSK - Shoulder: ***       - Inspection:    - No significant swelling, erythema, warmth, ecchymosis, lesion, or atrophy noted.        - ROM:    - Full AROM/PROM with ***   - Limited in ***        - Palpation:    - TTP at the ***.   - NTTP elsewhere.        - Strength:  (*antalgic)  - Shoulder Abduction   5    - Shoulder Flexion   5    - Shoulder Internal Rotation  5    - Shoulder External Rotation  5   - Elbow Flexion   5   - Elbow Extension   5   - Forearm Pronation   5   - Forearm Supination   5   - Wrist Extension   5   - Wrist Flexion    5   - FDI     5   - ADM     5   - FPL     5   - APB     5   - EIP     5   - EDC     5   - APL/EPB    5            - Special tests:        - Carson:  Neg    - Neers:  Neg    - Empty can:  Neg for pain and weakness    - [place]:  Neg    - Scarf:  Neg    - Speeds:  Neg    - Yergason:  Neg    - Apprehension/Relocation:  Neg       Radiology  I independently reviewed the available relevant imaging in the chart with my interpretations as above in HPI.     I independently reviewed today's new relevant imaging, with the following interpretation:  - ***      Procedure  ***      Assessment  No diagnosis found.    Plan  Earlene Mark is a pleasant 54 year old female that presents with ***. History and physical exam appear most consistent with ***.     We discussed the nature of the condition and available treatment options, and mutually agreed upon the following plan:    - Imaging:          - Reviewed and independently interpreted the relevant imaging in the chart, including any imaging ordered for today's clinic.  - Reviewed results and images with patient.   - Medications:          - Discussed pharmacologic options for pain relief.   - May use NSAIDs (Ibuprofen, Naproxen) or Acetaminophen (Tylenol) as needed for pain control.   - Do not  take these if previously advised to avoid them for other medical conditions.  - May also use topical medications such as lidocaine, IcyHot, BioFreeze, or Voltaren gel as needed for pain control.    - Voltaren gel is an anti-inflammatory cream that may be used up to 4 times per day over the painful area.   - Injections:          - Discussed possible injection options and alternatives.    - Injection options include:  ***     - *** Deferred injections today and will consider them in the future as needed.   - Performed a corticosteroid injection of the *** today in clinic. Patient tolerated the procedure well without complications.     - Post-procedure instructions:    - Keep the injection site clean and dry.   - Do not submerge the injection site for 24 hours (no baths, pools). Showers are ok.   - Rest the area for 24-48 hours before resuming normal activities. Avoid overexerting the area for the first few weeks.   - It may take 2-3 days to start noticing the effects of the injection and up to 3-4 weeks to feel significant benefits.   - Therapy:          - Discussed the benefits of therapy vs home exercise program for optimization of range of motion, flexibility, strength, stability and function.   - *** Preference is for a home exercise program.   - Home Exercise Program given today in clinic and recommendation given to perform HEP daily and after exacerbations.  - *** Preference is for therapy.   - *** Therapy referral placed today and instructed to call [phone] to schedule appointments.   - Modalities:          - May use ice, heat, massage or other modalities as needed.   - Bracing:          - Discussed bracing options and recommend using ***.    - Options presented in clinic and choice given to take our brace from clinic or purchase an equivalent brace from an outside source.   - Surgery:          - Discussed non-operative and operative treatment options for the patient's condition.   - Goal is to continue  conservative care for as long as possible before surgical intervention would need to be considered.  - Activity:          - *** Encouraged to remain active and participate in regular activities as symptoms allow.   Avoid or modify exacerbating activities as needed.   - *** Encouraged to rest and protect the injured area from further injury.  Avoid exacerbating activities and activities that are high-risk for falls.   - Follow up:          - *** As needed for re-evaluation and update to treatment plan.  - May follow up sooner for new/worsening symptoms.  - May contact clinic by phone or MyChart for questions or concerns.       Toan Serrano DO, YESSENIA  [facility] Sports Medicine  [facility] - Department of Orthopedic Surgery       Disclaimer:  This note was prepared and written using Dragon Medical dictation software. As a result, there may be errors in the script that have gone undetected. Please consider this when interpreting the information in this note.

## 2025-08-19 DIAGNOSIS — G47.09 OTHER INSOMNIA: ICD-10-CM

## 2025-08-19 RX ORDER — TRAZODONE HYDROCHLORIDE 100 MG/1
100 TABLET ORAL AT BEDTIME
Qty: 90 TABLET | Refills: 1 | Status: SHIPPED | OUTPATIENT
Start: 2025-08-19